# Patient Record
Sex: FEMALE | Race: WHITE | NOT HISPANIC OR LATINO | Employment: OTHER | URBAN - METROPOLITAN AREA
[De-identification: names, ages, dates, MRNs, and addresses within clinical notes are randomized per-mention and may not be internally consistent; named-entity substitution may affect disease eponyms.]

---

## 2017-01-01 ENCOUNTER — DOCUMENTATION ONLY (OUTPATIENT)
Dept: NEPHROLOGY | Facility: CLINIC | Age: 62
End: 2017-01-01

## 2017-01-05 ENCOUNTER — DOCUMENTATION ONLY (OUTPATIENT)
Dept: NEPHROLOGY | Facility: CLINIC | Age: 62
End: 2017-01-05

## 2017-01-05 NOTE — PROGRESS NOTES
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 She recently Had CABG and has had  hopsitalizations for wound infections.  Had L leg bypass recently. Her blood pressures are better on midodrine and is also taking metoprolol now.  Recent EGD neg.           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      SOCIAL HISTORY: She smokes one and half pack a day, which is down from three packs a day.       REVIEW OF SYSTEMS: Negative. No chest pain, no shortness of breath.  PHYSICAL EXAMINATION: 135/68   98.2  : T DW: 48 kg W: 53.9   kg P: 99  VITAL SIGNS: Reviewed.  GENERAL: The patient does not appear to be in any acute distress.  CARDIOVASCULAR: Rate and rhythm regular.  LUNGS: Clear to auscultation bilaterally.  EXTREMITIES: Access stable. Gonzales ext with 1+ edema on R and bandage on L . L LE healing well with bypass off  wound vac.         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      Labs pending.  1. ESRD. Kt/v 2.2. UF of 1L approx. Sometimes has weight gains of 3kgs but unable to tolerate UF more than  1L. Asked her to do an extra tx to get more fluid off.  Fluid retsriction emphasized.   2. Hypertension. Blood pressures are better-only on metoprolol 25 mg. On midodrine.   3. Anemia: had egd recently but no colonoscopy. Rec setting up colonoscopy as hgb more stable now. On mircera; had C/o dark stools- Gi appoint ASAP for colonoscopy and evaluation for nausea which is resolved. Gi does not want to do colonoscopy with her instability in the past. Hgb improving on mirecera and needs iron.   4. Nutrition: albumin is improving . She is on LiquaCel. High protein diet discussed.  5. Renal osteodystrophy: phos  high-low phos diet emphasized on fosrenol 2 packs with each meal. Off carafate started by Gi but held b/c of higher aluminum level. PTH stable-off calcitriol. Off Lasix as she does not urinate much. aluminum level improved off carafate.  C/o hematuira-although doesn't make urine-streaks of blood-rec urology eval although it could be from ruptured cysts. Heavy smoker-would need cystoscopy although it was neg from 2-3 years ago per pt. She would like to hold off until other issues are at[facility] care of. Discussed the risks.      plt ct low-hematology eval in hospital they thought it was due to abx.  If no improvement, will need to refer to hematology.

## 2017-02-16 ENCOUNTER — DOCUMENTATION ONLY (OUTPATIENT)
Dept: NEPHROLOGY | Facility: CLINIC | Age: 62
End: 2017-02-16

## 2017-02-16 NOTE — PROGRESS NOTES
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 She  Had CABG last year and L  leg bypass and has had  hopsitalizations for wound infections.  Pending R  leg bypass which is more urgent now with ulcers-pending appoint next week.  Her blood pressures are better on midodrine and is also taking metoprolol now. Recent EGD neg.           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      SOCIAL HISTORY: She smokes one and half pack a day, which is down from three packs a day.       REVIEW OF SYSTEMS: Negative. No chest pain, no shortness of breath.  PHYSICAL EXAMINATION: 112/55  97.8  : T DW: 48 kg W: 49.8   kg P: 78  VITAL SIGNS: Reviewed.  GENERAL: The patient does not appear to be in any acute distress.  CARDIOVASCULAR: Rate and rhythm regular.  LUNGS: Clear to auscultation bilaterally.  EXTREMITIES: Access stable. Gonzales ext with trace to 1+ edema on R and bandage on L . L LE healing well with bypass off  wound vac. R leg with ulcers.         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      Labs pending.  1. ESRD. Kt/v 2.2. UF of 1L-2L  Fluid retsriction emphasized. Kt/v of 2.2.  2. Hypertension. Blood pressures are better-only on metoprolol 25 mg. On midodrine.   3. Anemia: had egd recently but no colonoscopy. Rec setting up colonoscopy as hgb more stable now. On mircera; had C/o dark stools- Gi appoint ASAP for colonoscopy and evaluation for nausea which is resolved. Gi does not want to do colonoscopy with her instability in the past. Hgb improving on mirecera and finished iron.   4. Nutrition: albumin is improving . She is on LiquaCel. High protein diet discussed.  5. Renal osteodystrophy: phos high-low phos  diet emphasized on fosrenol 2 packs with each meal. Off carafate started by Gi but held b/c of higher aluminum level. PTH stable-off calcitriol. Off Lasix as she does not urinate much. aluminum level improved off carafate.  C/o hematuira-although doesn't make urine-streaks of blood-rec urology eval although it could be from ruptured cysts. Heavy smoker-would need cystoscopy although it was neg from 2-3 years ago per pt. She would like to hold off until other issues are taken care of. Discussed the risks.      plt ct low stable -hematology eval in hospital they thought it was due to abx. If no improvement, will need to refer to hematology.

## 2017-02-22 ENCOUNTER — OFFICE VISIT (OUTPATIENT)
Dept: VASCULAR SURGERY | Facility: CLINIC | Age: 62
End: 2017-02-22
Payer: COMMERCIAL

## 2017-02-22 ENCOUNTER — HOSPITAL ENCOUNTER (OUTPATIENT)
Dept: RADIOLOGY | Facility: HOSPITAL | Age: 62
Discharge: HOME | End: 2017-02-22
Attending: THORACIC SURGERY (CARDIOTHORACIC VASCULAR SURGERY)
Payer: MEDICARE

## 2017-02-22 VITALS
WEIGHT: 103.63 LBS | HEART RATE: 87 BPM | DIASTOLIC BLOOD PRESSURE: 62 MMHG | BODY MASS INDEX: 20.24 KG/M2 | SYSTOLIC BLOOD PRESSURE: 132 MMHG

## 2017-02-22 DIAGNOSIS — M79.604 LEG PAIN, RIGHT: Primary | ICD-10-CM

## 2017-02-22 DIAGNOSIS — I73.9 PERIPHERAL VASCULAR DISEASE, UNSPECIFIED: Primary | ICD-10-CM

## 2017-02-22 DIAGNOSIS — I73.9 PERIPHERAL VASCULAR DISEASE, UNSPECIFIED: ICD-10-CM

## 2017-02-22 PROCEDURE — 99213 OFFICE O/P EST LOW 20 MIN: CPT | Mod: S$GLB,,, | Performed by: THORACIC SURGERY (CARDIOTHORACIC VASCULAR SURGERY)

## 2017-02-22 PROCEDURE — 93926 LOWER EXTREMITY STUDY: CPT | Mod: TC

## 2017-02-22 PROCEDURE — 1160F RVW MEDS BY RX/DR IN RCRD: CPT | Mod: S$GLB,,, | Performed by: THORACIC SURGERY (CARDIOTHORACIC VASCULAR SURGERY)

## 2017-02-22 PROCEDURE — 93926 LOWER EXTREMITY STUDY: CPT | Mod: 26,,, | Performed by: RADIOLOGY

## 2017-02-22 RX ORDER — MINOCYCLINE HYDROCHLORIDE 100 MG/1
100 TABLET ORAL
COMMUNITY
End: 2017-07-21 | Stop reason: ALTCHOICE

## 2017-02-22 RX ORDER — OXYCODONE AND ACETAMINOPHEN 10; 325 MG/1; MG/1
1 TABLET ORAL EVERY 6 HOURS PRN
Qty: 40 TABLET | Refills: 0
Start: 2017-02-22 | End: 2017-07-21 | Stop reason: ALTCHOICE

## 2017-02-22 NOTE — PROGRESS NOTES
OFFICE NOTE    This is a 61-year-old lady with severe peripheral vascular disease.  She has   disabling limb claudication, rest pain in the right lower extremity.  She has   had multiple ulcers on the leg, but these seemed to have at least partially   healed and dried up and epithelialized.  She has no abscess or obvious   cellulitis.  She has dependent rubor and cyanosis of the extremity.    PROBLEM LIST:  Reviewed.  She has had a coronary artery bypass grafting by   myself within the last three to four months.  She has recovered from this.  She   has end-stage renal disease, hypertension and other issues that are well   described in her recent records.  She has COPD from habitual smoking.    PHYSICAL EXAMINATION:    VITAL SIGNS:  Stable.    HEENT:  Pupils are equal, round, reactive to light.  Nose and throat are clear.    NECK:  Supple.    CHEST:  Clear to auscultation.    HEART:  Regular rate and rhythm.    ABDOMEN:  Benign.    EXTREMITIES:  Femoral pulses are equal.  She has diminished pulses in both feet.    She has cyanosis of the right foot.  She has ulcerations, which are more or   less dry on the anterior aspect of the calf.  There is no obvious cellulitis.    At this point, we will get an arterial duplex of the extremity to see if she has   any options for revascularization.  Based on the results of this, we will make   further recommendations.      MICHAEL  dd: 02/22/2017 15:14:46 (CST)  td: 02/23/2017 03:20:46 (CST)  Doc ID   #[id number]  Job ID #[id number]    CC:

## 2017-04-21 ENCOUNTER — DOCUMENTATION ONLY (OUTPATIENT)
Dept: NEPHROLOGY | Facility: CLINIC | Age: 62
End: 2017-04-21

## 2017-04-21 NOTE — PROGRESS NOTES
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 She Had CABG last year and L leg bypass and has had hopsitalizations for wound infections.  R  leg stent placed last month during hospitalization. and possible bypass in that leg in the future.  Her blood pressures are better on midodrine and is not taking metoprolol now-but will check with her cardiologist today. Recent EGD neg.           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      SOCIAL HISTORY: She smokes one and half pack a day, which is down from three packs a day.       REVIEW OF SYSTEMS: Negative. No chest pain, no shortness of breath.  PHYSICAL EXAMINATION: 152/62  96.3   : T DW: 48 kg W: 47.3  kg P: 97  VITAL SIGNS: Reviewed.  GENERAL: The patient does not appear to be in any acute distress.  CARDIOVASCULAR: Rate and rhythm regular.  LUNGS: Clear to auscultation bilaterally.  EXTREMITIES: Access stable. Gonzales ext with trace to 1+ edema. L LE healing well with bypass off wound vac. R leg with ulcers healing well..          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      Labs pending.  1. ESRD. Kt/v 2.2. UF of 0.5L-1.5L  Fluid retsriction emphasized. Kt/v of 2.2.  2. Hypertension. Blood pressures are better. On midodrine.   3. Anemia: had egd recently but no colonoscopy. Rec setting up colonoscopy as hgb more stable now. On mircera;missed one dose last month b/c of hospitalization; had C/o dark stools- Gi appoint ASAP for colonoscopy and evaluation for nausea which is resolved. Gi does not want to do colonoscopy with her instability in the past. Hgb improving on mirecera and finished iron.   4. Nutrition: albumin is improving . She is on  LiquaCel. High protein diet discussed.  5. Renal osteodystrophy: phos high-low phos diet emphasized on fosrenol 2 packs with each meal. Off carafate started by Gi but held b/c of higher aluminum level. PTH stable-off calcitriol. Off Lasix as she does not urinate much. aluminum level improved off carafate.  C/o hematuira-although doesn't make urine-streaks of blood-rec urology eval although it could be from ruptured cysts. Heavy smoker-would need cystoscopy although it was neg from 2-3 years ago per pt. She would like to hold off until other issues are taken care of. Discussed the risks.   L arm access.  plt ct low stable -hematology eval in hospital they thought it was due to abx. If no improvement, will need to refer to hematology.

## 2017-05-08 ENCOUNTER — DOCUMENTATION ONLY (OUTPATIENT)
Dept: NEPHROLOGY | Facility: CLINIC | Age: 62
End: 2017-05-08

## 2017-05-08 NOTE — PROGRESS NOTES
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 She Had CABG last year and L leg bypass and has had hopsitalizations for wound infections. R leg stent placed recently during hospitalization and possible bypass in that leg in the future.  Her blood pressures are better on midodrine.  Recent EGD neg.           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      SOCIAL HISTORY: She smokes one and half pack a day, which is down from three packs a day.       REVIEW OF SYSTEMS: Negative. No chest pain, no shortness of breath.  PHYSICAL EXAMINATION: 116/56  T: 98.6  DW:47.5 kg W: 47.1 kg P: 89  VITAL SIGNS: Reviewed.  GENERAL: The patient does not appear to be in any acute distress.  CARDIOVASCULAR: Rate and rhythm regular.  LUNGS: Clear to auscultation bilaterally.  EXTREMITIES: Access stable. Gonzales ext with trace to 1+ edema. L LE healing well with bypass off wound vac. R leg with ulcers healing well.  Has erythema not right under her access but slightly below-likely trauma -sasha a line so she can keep track to see if its extending.  Above access-2 knots with erythema.          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      Labs pending.  1. ESRD. Kt/v 2.6 UF of 0.5L-1.5L Fluid retsriction emphasized. Change DW to 46.5 kg. Change K to 1 k bath-low K diet.   2. Hypertension. Blood pressures are stable on metoprolol.  On midodrine.   3. Anemia: stable.  had egd recently but no colonoscopy. Rec setting up colonoscopy as hgb more stable now. On mircera 150. had C/o dark stools- Gi appoint ASAP for colonoscopy and evaluation for nausea which is resolved. Gi does not want to do colonoscopy with her instability in the  past. Hgb improving on mirecera and will give iron bolus.   4. Nutrition: albumin is improving . She is on LiquaCel. High protein diet discussed.  5. Renal osteodystrophy: phos high-low phos diet emphasized on fosrenol 2 packs with each meal. Off carafate started by Gi but held b/c of higher aluminum level. PTH stable-off calcitriol. Off Lasix as she does not urinate much. aluminum level improved off carafate.  C/o hematuira-although doesn't make urine-streaks of blood-rec urology eval although it could be from ruptured cysts. Heavy smoker-would need cystoscopy although it was neg from 2-3 years ago per pt. She would like to hold off until other issues are taken care of. Discussed the risks.   L arm access-has been bleeding more than ususal.   Has erythema not right under her access but slightly below-likely trauma -sasha a line so [facility] can keep track to see if its extending.  Above access-2 knots with erythema. Refer to Dr. Sher for possible stenosis.   plt ct low stable -hematology eval in hospital they thought it was due to abx. If no improvement, will need to refer to hematology.

## 2017-05-09 ENCOUNTER — HOSPITAL ENCOUNTER (OUTPATIENT)
Dept: VASCULAR SURGERY | Facility: CLINIC | Age: 62
Discharge: HOME | End: 2017-05-09
Payer: MEDICARE

## 2017-05-09 ENCOUNTER — OFFICE VISIT (OUTPATIENT)
Dept: VASCULAR SURGERY | Facility: CLINIC | Age: 62
End: 2017-05-09
Payer: MEDICARE

## 2017-05-09 VITALS
HEART RATE: 88 BPM | SYSTOLIC BLOOD PRESSURE: 134 MMHG | TEMPERATURE: 99 F | WEIGHT: 106 LBS | DIASTOLIC BLOOD PRESSURE: 65 MMHG | HEIGHT: 59 IN | BODY MASS INDEX: 21.37 KG/M2

## 2017-05-09 DIAGNOSIS — T82.858A STENOSIS OF ARTERIOVENOUS DIALYSIS FISTULA, INITIAL ENCOUNTER: ICD-10-CM

## 2017-05-09 DIAGNOSIS — Z99.2 ESRD ON DIALYSIS: Primary | ICD-10-CM

## 2017-05-09 DIAGNOSIS — Z01.818 PRE-OP EVALUATION: Primary | ICD-10-CM

## 2017-05-09 DIAGNOSIS — N18.6 ESRD ON DIALYSIS: Primary | ICD-10-CM

## 2017-05-09 DIAGNOSIS — N18.6 ESRD ON DIALYSIS: ICD-10-CM

## 2017-05-09 DIAGNOSIS — Z99.2 ESRD ON DIALYSIS: ICD-10-CM

## 2017-05-09 PROCEDURE — 99214 OFFICE O/P EST MOD 30 MIN: CPT | Mod: S$GLB,,, | Performed by: SURGERY

## 2017-05-09 PROCEDURE — 93990 DOPPLER FLOW TESTING: CPT | Mod: PBBFAC | Performed by: SURGERY

## 2017-05-09 PROCEDURE — 1160F RVW MEDS BY RX/DR IN RCRD: CPT | Mod: S$GLB,,, | Performed by: SURGERY

## 2017-05-09 PROCEDURE — 99999 PR PBB SHADOW E&M-EST. PATIENT-LVL III: CPT | Mod: PBBFAC,,, | Performed by: SURGERY

## 2017-05-09 PROCEDURE — 93990 DOPPLER FLOW TESTING: CPT | Mod: 26,S$PBB,, | Performed by: SURGERY

## 2017-05-09 PROCEDURE — 99213 OFFICE O/P EST LOW 20 MIN: CPT | Mod: PBBFAC | Performed by: SURGERY

## 2017-05-09 RX ORDER — LIDOCAINE HYDROCHLORIDE 10 MG/ML
1 INJECTION, SOLUTION EPIDURAL; INFILTRATION; INTRACAUDAL; PERINEURAL ONCE
Status: CANCELLED | OUTPATIENT
Start: 2017-05-09 | End: 2017-05-09

## 2017-05-09 RX ORDER — SODIUM CHLORIDE 9 MG/ML
INJECTION, SOLUTION INTRAVENOUS CONTINUOUS
Status: CANCELLED | OUTPATIENT
Start: 2017-05-09

## 2017-05-09 NOTE — PROGRESS NOTES
"HISTORY OF PRESENT ILLNESS:  A 61-year-old female with end-stage renal disease   in whom I placed a left brachiocephalic AV fistula on 03/14/2014.    I have not seen her in over five years.    She reports having increased bleeding after dialysis runs.  She does dialyze at   home with the exception of Wednesdays and Saturdays.    It appears her last fistulogram was several years ago.    She is very concerned that she has "black things," coming out of various areas   of her skin.  She clearly bruises very easily and I wonder if this is just small   spontaneous bleeding.    PAST MEDICAL HISTORY:  1.  End-stage renal disease.  2.  Left internal carotid artery cavernous aneurysm.  3.  COPD.  4.  Hyperparathyroidism.  5.  Hypertension.  6.  Coronary artery disease, status post CABG June 2016.  7.  Peripheral arterial occlusive disease, status post fem-pop bypass in [place].    PAST SURGICAL HISTORY:  1.  As above.  2.  Hysterectomy.    FAMILY HISTORY:  Positive for cancer and heart disease.    SOCIAL HISTORY:  Current smoking.    MEDICATIONS:  Include aspirin and Plavix.  See EPIC for full list.    ALLERGIES:  Sulfa.    REVIEW OF SYSTEMS:  CARDIOVASCULAR:  Denies postprandial pain or DVT.  All other systems including eyes, ENT, respiratory, musculoskeletal, breast,   neurologic, psychiatric, lymph allergy and immune are negative.    PHYSICAL EXAMINATION:  VITAL SIGNS:  See nursing note.  GENERAL:  No acute distress.  RESPIRATORY:  Normal effort.  Clear to auscultation.  CARDIOVASCULAR:  Regular rhythm, nondisplaced PMI, no murmur.  EXTREMITIES:  Left arm shows multiple areas of ecchymosis bilaterally.  The   brachiocephalic fistula with moderate true aneurysmal changes over the first 4   to 5 cm and skin thinning and ulceration.  Notably, the fistula has moderate to   severe pulsatility in the upper arm suggestive of an outflow stenosis.  NEUROLOGIC:  Cranial nerves VII to XII are intact, 5/5 motor strength in all " "  extremities.    IMAGING:  Duplex of her fistula does not show any outflow stenosis with a flow   volume of 1.6 liters.    ASSESSMENT:  Clinically, she has a high outflow stenosis with increased bleeding   and a pulsatile fistula.  A fistulogram is warranted.    I believe that her concerns about "organisms" coming out of skin at these   ecchymotic areas may be punctate bleeding and I have suggested that this may   improve substantially if she can stop the Plavix.  I have suggested that she   talk with her heart doctor to see if it is okay for her to stop the Plavix.    PLAN:  Left fistulogram and possible intervention, 05/17/2017.    The patient understands the risks and rationale of the procedure and wishes to   proceed.      DEJA  dd: 05/09/2017 14:54:06 (CDT)  td: 05/09/2017 15:22:44 (CDT)  Doc ID   #[id number]  Job ID #[id number]    CC:       "

## 2017-05-16 ENCOUNTER — TELEPHONE (OUTPATIENT)
Dept: VASCULAR SURGERY | Facility: CLINIC | Age: 62
End: 2017-05-16

## 2017-05-17 ENCOUNTER — ANESTHESIA (OUTPATIENT)
Dept: SURGERY | Facility: HOSPITAL | Age: 62
End: 2017-05-17
Payer: MEDICARE

## 2017-05-17 ENCOUNTER — ANESTHESIA EVENT (OUTPATIENT)
Dept: SURGERY | Facility: HOSPITAL | Age: 62
End: 2017-05-17
Payer: MEDICARE

## 2017-05-17 ENCOUNTER — HOSPITAL ENCOUNTER (OUTPATIENT)
Facility: HOSPITAL | Age: 62
Discharge: HOME | End: 2017-05-17
Attending: SURGERY | Admitting: SURGERY
Payer: MEDICARE

## 2017-05-17 VITALS
OXYGEN SATURATION: 94 % | TEMPERATURE: 99 F | RESPIRATION RATE: 20 BRPM | HEART RATE: 74 BPM | SYSTOLIC BLOOD PRESSURE: 105 MMHG | DIASTOLIC BLOOD PRESSURE: 54 MMHG

## 2017-05-17 DIAGNOSIS — Z99.2 ESRD ON DIALYSIS: ICD-10-CM

## 2017-05-17 DIAGNOSIS — M25.532 LEFT WRIST PAIN: ICD-10-CM

## 2017-05-17 DIAGNOSIS — S52.109A: ICD-10-CM

## 2017-05-17 DIAGNOSIS — I25.10 CORONARY ARTERY DISEASE, ANGINA PRESENCE UNSPECIFIED, UNSPECIFIED VESSEL OR LESION TYPE, UNSPECIFIED WHETHER NATIVE OR TRANSPLANTED HEART: Primary | Chronic | ICD-10-CM

## 2017-05-17 DIAGNOSIS — N18.6 ESRD ON DIALYSIS: ICD-10-CM

## 2017-05-17 DIAGNOSIS — S52.502A CLOSED FRACTURE OF DISTAL END OF LEFT RADIUS, UNSPECIFIED FRACTURE MORPHOLOGY, INITIAL ENCOUNTER: ICD-10-CM

## 2017-05-17 PROCEDURE — D9220A PRA ANESTHESIA: Mod: CRNA,,, | Performed by: NURSE ANESTHETIST, CERTIFIED REGISTERED

## 2017-05-17 PROCEDURE — 36000707: Performed by: SURGERY

## 2017-05-17 PROCEDURE — 25000003 PHARM REV CODE 250

## 2017-05-17 PROCEDURE — C1769 GUIDE WIRE: HCPCS | Performed by: SURGERY

## 2017-05-17 PROCEDURE — 37000009 HC ANESTHESIA EA ADD 15 MINS: Performed by: SURGERY

## 2017-05-17 PROCEDURE — 25000003 PHARM REV CODE 250: Performed by: NURSE ANESTHETIST, CERTIFIED REGISTERED

## 2017-05-17 PROCEDURE — 25500020 PHARM REV CODE 255: Performed by: SURGERY

## 2017-05-17 PROCEDURE — 25000003 PHARM REV CODE 250: Performed by: SURGERY

## 2017-05-17 PROCEDURE — 36000706: Performed by: SURGERY

## 2017-05-17 PROCEDURE — 37000008 HC ANESTHESIA 1ST 15 MINUTES: Performed by: SURGERY

## 2017-05-17 PROCEDURE — 71000015 HC POSTOP RECOV 1ST HR: Performed by: SURGERY

## 2017-05-17 PROCEDURE — C1894 INTRO/SHEATH, NON-LASER: HCPCS | Performed by: SURGERY

## 2017-05-17 PROCEDURE — C1725 CATH, TRANSLUMIN NON-LASER: HCPCS | Performed by: SURGERY

## 2017-05-17 PROCEDURE — D9220A PRA ANESTHESIA: Mod: ANES,,, | Performed by: ANESTHESIOLOGY

## 2017-05-17 PROCEDURE — 36902 INTRO CATH DIALYSIS CIRCUIT: CPT | Mod: GC,,, | Performed by: SURGERY

## 2017-05-17 PROCEDURE — 27201423 OPTIME MED/SURG SUP & DEVICES STERILE SUPPLY: Performed by: SURGERY

## 2017-05-17 PROCEDURE — 71000016 HC POSTOP RECOV ADDL HR: Performed by: SURGERY

## 2017-05-17 PROCEDURE — 63600175 PHARM REV CODE 636 W HCPCS: Performed by: NURSE ANESTHETIST, CERTIFIED REGISTERED

## 2017-05-17 RX ORDER — HYDROCODONE BITARTRATE AND ACETAMINOPHEN 5; 325 MG/1; MG/1
1 TABLET ORAL EVERY 4 HOURS PRN
Status: DISCONTINUED | OUTPATIENT
Start: 2017-05-17 | End: 2017-05-17

## 2017-05-17 RX ORDER — IODIXANOL 320 MG/ML
INJECTION, SOLUTION INTRAVASCULAR
Status: DISCONTINUED | OUTPATIENT
Start: 2017-05-17 | End: 2017-05-17 | Stop reason: HOSPADM

## 2017-05-17 RX ORDER — MIDAZOLAM HYDROCHLORIDE 1 MG/ML
INJECTION INTRAMUSCULAR; INTRAVENOUS
Status: DISCONTINUED | OUTPATIENT
Start: 2017-05-17 | End: 2017-05-17

## 2017-05-17 RX ORDER — OXYCODONE HYDROCHLORIDE 5 MG/1
TABLET ORAL
Status: COMPLETED
Start: 2017-05-17 | End: 2017-05-17

## 2017-05-17 RX ORDER — PROPOFOL 10 MG/ML
VIAL (ML) INTRAVENOUS
Status: DISCONTINUED | OUTPATIENT
Start: 2017-05-17 | End: 2017-05-17

## 2017-05-17 RX ORDER — OXYCODONE HYDROCHLORIDE 5 MG/1
15 TABLET ORAL EVERY 4 HOURS PRN
Status: DISCONTINUED | OUTPATIENT
Start: 2017-05-17 | End: 2017-05-17 | Stop reason: HOSPADM

## 2017-05-17 RX ORDER — HEPARIN SODIUM 1000 [USP'U]/ML
INJECTION, SOLUTION INTRAVENOUS; SUBCUTANEOUS
Status: DISCONTINUED | OUTPATIENT
Start: 2017-05-17 | End: 2017-05-17 | Stop reason: HOSPADM

## 2017-05-17 RX ORDER — FENTANYL CITRATE 50 UG/ML
INJECTION, SOLUTION INTRAMUSCULAR; INTRAVENOUS
Status: DISCONTINUED | OUTPATIENT
Start: 2017-05-17 | End: 2017-05-17

## 2017-05-17 RX ORDER — LIDOCAINE HYDROCHLORIDE 10 MG/ML
1 INJECTION, SOLUTION EPIDURAL; INFILTRATION; INTRACAUDAL; PERINEURAL ONCE
Status: DISCONTINUED | OUTPATIENT
Start: 2017-05-17 | End: 2017-05-17 | Stop reason: HOSPADM

## 2017-05-17 RX ORDER — SODIUM CHLORIDE 9 MG/ML
INJECTION, SOLUTION INTRAVENOUS CONTINUOUS PRN
Status: DISCONTINUED | OUTPATIENT
Start: 2017-05-17 | End: 2017-05-17

## 2017-05-17 RX ORDER — LIDOCAINE HYDROCHLORIDE 10 MG/ML
INJECTION, SOLUTION INTRAVENOUS
Status: DISCONTINUED | OUTPATIENT
Start: 2017-05-17 | End: 2017-05-17

## 2017-05-17 RX ORDER — LIDOCAINE HYDROCHLORIDE 10 MG/ML
INJECTION, SOLUTION EPIDURAL; INFILTRATION; INTRACAUDAL; PERINEURAL
Status: DISCONTINUED | OUTPATIENT
Start: 2017-05-17 | End: 2017-05-17 | Stop reason: HOSPADM

## 2017-05-17 RX ADMIN — PROPOFOL 20 MG: 10 INJECTION, EMULSION INTRAVENOUS at 11:05

## 2017-05-17 RX ADMIN — Medication 2 G: at 10:05

## 2017-05-17 RX ADMIN — OXYCODONE HYDROCHLORIDE 15 MG: 5 TABLET ORAL at 01:05

## 2017-05-17 RX ADMIN — PROPOFOL 10 MG: 10 INJECTION, EMULSION INTRAVENOUS at 11:05

## 2017-05-17 RX ADMIN — SODIUM CHLORIDE: 0.9 INJECTION, SOLUTION INTRAVENOUS at 10:05

## 2017-05-17 RX ADMIN — MIDAZOLAM HYDROCHLORIDE 1 MG: 1 INJECTION, SOLUTION INTRAMUSCULAR; INTRAVENOUS at 10:05

## 2017-05-17 RX ADMIN — FENTANYL CITRATE 100 MCG: 50 INJECTION, SOLUTION INTRAMUSCULAR; INTRAVENOUS at 11:05

## 2017-05-17 RX ADMIN — LIDOCAINE HYDROCHLORIDE 10 MG: 10 INJECTION, SOLUTION INTRAVENOUS at 11:05

## 2017-05-17 NOTE — SUBJECTIVE & OBJECTIVE
Past Medical History:   Diagnosis Date    Aneurysm of cavernous portion of left internal carotid artery     Anxiety associated with depression     COPD (chronic obstructive pulmonary disease)     ESRD (end stage renal disease) on dialysis     Hyperparathyroidism, secondary renal     Hypertension     Myocardial infarct     age 40,41 years     PLD (polycystic liver disease)     Polycystic kidney disease, autosomal dominant     Thyroid disease        Past Surgical History:   Procedure Laterality Date     SECTION, LOW TRANSVERSE  ,    CORONARY ANGIOPLASTY WITH STENT PLACEMENT      Has 2  stents    DIALYSIS FISTULA CREATION      HYSTERECTOMY         Review of patient's allergies indicates:   Allergen Reactions    Sulfamethoxazole-trimethoprim Other (See Comments)     Pt is unsure if she is actually allergic to the medicine.        Current Facility-Administered Medications   Medication    lidocaine (PF) 10 mg/ml (1%) injection 10 mg    oxycodone immediate release tablet 15 mg     Family History     Problem Relation (Age of Onset)    Alcohol abuse Brother    Cancer Brother    Heart disease Brother    Hypertension Mother    Kidney disease Father, Sister, Brother    Polycystic kidney disease Sister        Social History Main Topics    Smoking status: Current Some Day Smoker     Packs/day: 1.00    Smokeless tobacco: Not on file      Comment: Smokes one to two packs/day    Alcohol use No    Drug use: No    Sexual activity: Yes     Partners: Male     ROS     Review of Systems   Constitution: Negative. Negative for chills, fever and night sweats.   HENT: neg congestion.    Eyes: Negative for blurred vision.  Cardiovascular: Negative for chest pain and syncope.   Respiratory: Negative for cough and shortness of breath.    Hematologic/Lymphatic: Does not bruise/bleed easily.   Musculoskeletal: + fall.  No weakness  Gastrointestinal: Negative for abdominal pain.  Normal bowel  function.  Genitourinary: Normal bladder function   Neurological: No dizziness, loss of balance, seizures.   Psychiatric/Behavioral: Negative for depression.       Objective:     Vital Signs (Most Recent):  Temp: 97.5 °F (36.4 °C) (05/17/17 1133)  Pulse: 67 (05/17/17 1330)  Resp: 20 (05/17/17 1215)  BP: (!) 97/44 (05/17/17 1330)  SpO2: 97 % (05/17/17 1330) Vital Signs (24h Range):  Temp:  [97.5 °F (36.4 °C)] 97.5 °F (36.4 °C)  Pulse:  [66-74] 67  Resp:  [20] 20  SpO2:  [95 %-97 %] 97 %  BP: ()/(38-48) 97/44           There is no height or weight on file to calculate BMI.      Intake/Output Summary (Last 24 hours) at 05/17/17 1401  Last data filed at 05/17/17 1134   Gross per 24 hour   Intake              200 ml   Output                0 ml   Net              200 ml       Ortho/SPM Exam    Gen:  No acute distress  Head:  Normocephalic.  Atraumatic.  Neuro:  Intact  CV:  Peripherally well-perfused.  Pulses 2+ bilaterally.  Lungs:  Normal respiratory effort.  Abdomen:  Soft, non-tender, non-distended  Extremities:  No cyanosis, clubbing, or edema.  LUE:  Dressing from fistulogram intact.  Significantly tender in distal radius.  Mild tenderness in scaphoid tuberosity and snuffbox.  Palpable radial pulse.  AIN, PIN, median, radial, ulnar nerves intact.  Diffusely poor friable skin with petechiae    Significant Labs:     Significant Imaging:   Xray left wrist shows non-displaced distal radius fracture.  Questionable cortical defect in scaphoid which appears chronic.

## 2017-05-17 NOTE — CONSULTS
[facility]  Orthopedics  Consult Note    Patient Name: Althea Hsieh  MRN: [id number]  Admission Date: 2017  Hospital Length of Stay: 0 days  Attending Provider: ROBLES Goldberg III, MD  Primary Care Provider: Abelardo San MD    Patient information was obtained from patient.     Consults  Subjective:     Principal Problem:<principal problem not specified>    Chief Complaint: left wrist pain     HPI: Patient is a 61 year old female here for left upper extremity fistulogram with Dr. Goldberg.  She fell 2 days ago from a standing height onto her outstretched left hand and left hip.  She continues to have left wrist pain today.  Orthopedics consulted post-op for evaluation of left wrist.     Patient's left hip pain is significantly better today.      Past Medical History:   Diagnosis Date    Aneurysm of cavernous portion of left internal carotid artery     Anxiety associated with depression     COPD (chronic obstructive pulmonary disease)     ESRD (end stage renal disease) on dialysis     Hyperparathyroidism, secondary renal     Hypertension     Myocardial infarct     age 40,41 years     PLD (polycystic liver disease)     Polycystic kidney disease, autosomal dominant     Thyroid disease        Past Surgical History:   Procedure Laterality Date     SECTION, LOW TRANSVERSE  ,    CORONARY ANGIOPLASTY WITH STENT PLACEMENT      Has 2  stents    DIALYSIS FISTULA CREATION      HYSTERECTOMY         Review of patient's allergies indicates:   Allergen Reactions    Sulfamethoxazole-trimethoprim Other (See Comments)     Pt is unsure if she is actually allergic to the medicine.        Current Facility-Administered Medications   Medication    lidocaine (PF) 10 mg/ml (1%) injection 10 mg    oxycodone immediate release tablet 15 mg     Family History     Problem Relation (Age of Onset)    Alcohol abuse Brother    Cancer Brother    Heart disease Brother    Hypertension  Mother    Kidney disease Father, Sister, Brother    Polycystic kidney disease Sister        Social History Main Topics    Smoking status: Current Some Day Smoker     Packs/day: 1.00    Smokeless tobacco: Not on file      Comment: Smokes one to two packs/day    Alcohol use No    Drug use: No    Sexual activity: Yes     Partners: Male     ROS     Review of Systems   Constitution: Negative. Negative for chills, fever and night sweats.   HENT: neg congestion.    Eyes: Negative for blurred vision.  Cardiovascular: Negative for chest pain and syncope.   Respiratory: Negative for cough and shortness of breath.    Hematologic/Lymphatic: Does not bruise/bleed easily.   Musculoskeletal: + fall.  No weakness  Gastrointestinal: Negative for abdominal pain.  Normal bowel function.  Genitourinary: Normal bladder function   Neurological: No dizziness, loss of balance, seizures.   Psychiatric/Behavioral: Negative for depression.       Objective:     Vital Signs (Most Recent):  Temp: 97.5 °F (36.4 °C) (05/17/17 1133)  Pulse: 67 (05/17/17 1330)  Resp: 20 (05/17/17 1215)  BP: (!) 97/44 (05/17/17 1330)  SpO2: 97 % (05/17/17 1330) Vital Signs (24h Range):  Temp:  [97.5 °F (36.4 °C)] 97.5 °F (36.4 °C)  Pulse:  [66-74] 67  Resp:  [20] 20  SpO2:  [95 %-97 %] 97 %  BP: ()/(38-48) 97/44           There is no height or weight on file to calculate BMI.      Intake/Output Summary (Last 24 hours) at 05/17/17 1401  Last data filed at 05/17/17 1134   Gross per 24 hour   Intake              200 ml   Output                0 ml   Net              200 ml       Ortho/SPM Exam    Gen:  No acute distress  Head:  Normocephalic.  Atraumatic.  Neuro:  Intact  CV:  Peripherally well-perfused.  Pulses 2+ bilaterally.  Lungs:  Normal respiratory effort.  Abdomen:  Soft, non-tender, non-distended  Extremities:  No cyanosis, clubbing, or edema.  LUE:  Dressing from fistulogram intact.  Significantly tender in distal radius.  Mild tenderness in  scaphoid tuberosity and snuffbox.  Palpable radial pulse.  AIN, PIN, median, radial, ulnar nerves intact.  Diffusely poor friable skin with petechiae    Significant Labs:     Significant Imaging:   Xray left wrist shows non-displaced distal radius fracture.  Questionable cortical defect in scaphoid which appears chronic.    Xray left hip shows no fractures or dislocations    Assessment/Plan:     Fracture of left distal radius  Closed left distal radius fracture, non-displaced.  Non-op treatment.    Unable to splint in sugar tong due to vascular access.  Will place in removeable wrist brace with thumb spica for possible concomitant scaphoid fracture.    F/u in clinic in ~2 weeks.    non weight bearing LUE.  May remove splint for access.      No hip fracture    Thank you for your consult.     Jerson Gill MD  Orthopedics  [facility] Note:  I agree with the above assessment and plan.    Matthew Godfrey MD

## 2017-05-17 NOTE — PROGRESS NOTES
Patient with complaints of fall on Monday with pain in her left wrist and swelling.  Xray of wrist taken demonstrating a nondisplaced left radius fracture. Orthopedic surgery consulted, pending recommendations plan for discharge home to follow up with Dr. Goldberg later this week with vascular US and suture removal.    Oralia Trotter DO  PGY-6, Vascular fellow   [phone]

## 2017-05-17 NOTE — DISCHARGE INSTRUCTIONS
Do not bare any weight on left wrist.  Orthopedic team will call for follow-up appointment.      Wrist Fracture, General  You have a broken bone (fracture) in your wrist. This may be a small crack or chip in the bone. Or it may be a major break, with the broken parts pushed out of position. Wrist fractures are treated with a splint or cast. They take about 4 to 6 weeks to heal. Severe injuries may need surgery.    Home care  Follow these guidelines when caring for yourself at home:  · Keep your arm elevated to reduce pain and swelling. When sitting or lying down keep your arm above the level of your heart. You can do this by placing your arm on a pillow that rests on your chest or on a pillow at your side. This is most important during the first 2 days (48 hours) after the injury.  · Put an ice pack on the injured area. Do this for 20 minutes every 1 to 2 hours the first day for pain relief. You can make an ice pack by wrapping a plastic bag of ice cubes in a thin towel. As the ice melts, be careful that the cast or splint doesnt get wet. Continue using the ice pack 3 to 4 times a day for the next 2 days. Then use the ice pack as needed to ease pain and swelling.  · Keep the cast or splint completely dry at all times. Bathe with your cast or splint out of the water. Protect it with a large plastic bag, rubber-banded at the top end. If a fiberglass cast or splint gets wet, you can dry it with a hair dryer.  · You may use acetaminophen or ibuprofen to control pain, unless another pain medicine was prescribed. If you have chronic liver or kidney disease, talk with your health care provider before using these medicines. Also talk with your provider if youve had a stomach ulcer or GI bleeding.  · Dont put creams or objects under the cast if you have itching.  Follow-up care  Follow up with your health care provider in 1 week, or as advised. This is to make sure the bone is healing the way it should. If a splint was  put on, it will be changed to a cast during your follow-up visit. A cast may need to be changed at 2 to 3 weeks, as the swelling goes down.  If X-rays were taken, a radiologist will look at them. You will be told of any new findings that may affect your care.  When to seek medical advice  Call your health care provider right away if any of these occur:  · The plaster cast or splint becomes wet or soft  · The cast cracks  · Bad odor from the cast or wound fluid stains the cast  · The fiberglass cast or splint stays wet for more than 24 hours  · Tightness or pain under the cast or splint gets worse  · Fingers become swollen, cold, blue, numb, or tingly  · You cant move your fingers  · Skin around cast becomes red  Date Last Reviewed: 2/16/2015 © 2000-2016 [employer]. [address] [place] [address]. All rights reserved. This information is not intended as a substitute for professional medical care. Always follow your healthcare professional's instructions.            Arteriovenous (AV) Fistula for Dialysis  An AV fistula is a connection between an artery and a vein. For this procedure, an AV fistula is surgically created using an artery and a vein in your arm. (Your doctor will let you know if another site is to be used.) When the artery and vein are joined, blood flow increases from the artery into the vein. As a result, the vein enlarges over time. The enlarged vein provides easier access to the blood for dialysis (a treatment for kidney failure). This sheet explains the procedure and what to expect.     An AV fistula increases blood flow from the artery into the vein. Over time, the vein becomes stronger and enlarged.   Preparing for the Procedure  Prepare as you have been told. In addition:  · Tell your doctor about all medications you take. This includes over-the-counter drugs. It also includes herbs and other supplements. You may need to stop taking some or all of them before the  procedure.  · Follow any directions youre given for not eating or drinking before the procedure.  · Do not allow anyone to draw blood from or take blood pressure on the arm that will have the fistula prior to the procedure.  The Day of the Procedure  The procedure takes about 1-2 hours. Youll likely go home the same day.  Before the procedure begins:  · An IV line is put into a vein in the arm or hand not being used for the procedure. This line supplies fluids and medications.  · To keep you free of pain during the procedure, youre given general anesthesia. This medication puts you into a state like deep sleep through the procedure. Or a nerve block may be used. This medication numbs the arm. With it, you may also be given medication that makes you relaxed and drowsy through the procedure.  During the procedure:  · The skin over your arm may be injected with numbing medication.  · One or more small incisions are then made through the numbed skin. This depends on the size of your arm and the depth of the vein in your arm.  · The vein is attached to the selected artery.  · Any incisions made are then closed with sutures (stitches), staples, surgical glue, or strips of surgical tape.  After the procedure:  · Youll be asked to keep your arm raised (elevated) as often as possible for at least a week after the procedure.  · Youll be given medications to manage pain as needed.  · Your arm and hand will be checked to make sure blood is flowing through the fistula properly. The feeling of blood rushing through the fistula is called a thrill. It is somewhat similar to the purring of a cat. Youll be taught how to check for this feeling each day to make sure there are no problems with your fistula. Youll also be taught how to care for your fistula at home.  · When its time for you to leave the hospital, have an adult family member or friend ready to drive you home.  Recovering at Home  Once at home, follow all of the  instructions youve been given. Be sure to:  · Take all medications as directed.  · Care for your incision as instructed.  · Check for signs of infection at the incision site (see below).  · Avoid heavy lifting and strenuous activities as directed.  · Monitor and care for your fistula as instructed.  · Do your hand and arm exercises as instructed. This usually involves squeezing a ball in your hand for a few minutes each hour.  Call Your Health Care Provider If You Have Any of the Following:  · Fever of 100.4°F or higher  · Signs of infection at the incision site, such as increased redness or swelling, warmth, worsening pain, bleeding, or foul-smelling drainage  · Lack of a thrill (you cant feel it)  · Pain or numbness in your fingers, hand, or arm  · Bleeding, redness, or warmth around your fistula  · Sudden bulging of the fistula (more than usual; a slight bulge is normal)   Follow-Up  Your health care provider will check your fistula within 1 to 2 weeks after the procedure. It will likely take about 6 to 8 weeks for the fistula to enlarge enough to start dialysis. After that, make sure the fistula is checked each time you have dialysis. Your health care provider may also suggest checkups every 6 months.  Risks and Possible Complications Include:  · Failure of the fistula to work properly  · Long wait before the fistula is ready (up to 6 months)  · Coldness or numbness in the hand (due to blood flowing away from the hand and into the fistula)  · An unsightly bump under the skin (due to enlargement of the fistula)  · Prolonged bleeding from the fistula after dialysis  · Narrowing or weakening of the blood vessels used for the fistula  · Formation of blood clots in the blood vessels used for the fistula  · Risks of anesthesia or any other medications used during the procedure   Living with an AV Fistula  A problem, such as narrowing of the vein (stricture) or infection, can make the fistula unusable. If this  happens, you may need other treatments to repair or make a new fistula. To protect your fistula, follow these and any other guidelines youre given:  · Check your fistula as often as your health care provider says. If you cant feel your thrill, let he or she know right away.  · Make sure your fistula is checked before each dialysis treatment.  · Dont let anyone draw blood from or take blood pressure on the arm that has the fistula.  · Wash your hands often and keep the area around your fistula clean.  · Dont sleep on the arm that has the fistula.  · Dont wear tight jewelry or a watch on the arm with your fistula.  · Protect your fistula from cuts, scrapes, or blows.  Date Last Reviewed: 11/26/2014 © 2000-2016 [employer]. [address] [place] [address], [place] [address]. All rights reserved. This information is not intended as a substitute for professional medical care. Always follow your healthcare professional's instructions.

## 2017-05-17 NOTE — OP NOTE
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    Description of the Findings of the Procedure: high grade stenosis at cephalic confluence; decreased filling of collaterals after PTA; strong thrill at conclusion 2+ left radial pulse.      Complications: No    Estimated Blood Loss (EBL): 10 mL           Implants: none    Specimens:   Specimen     None                  Condition: Good    Disposition: PACU - hemodynamically stable.     Operation in detail: After an informed consent was obtained the patient was taken to the interventional suite and placed in the supine position.  The left arm was prepped and draped in the standard surgical fashion. Under palpation, the proximal aspect of the left AVF was accessed with a micropuncture needle;  followed by placement of 4/3-Macanese micropuncture dilator. Through this, an 0.035-inch Glidewire was placed through the high-grade stenosis cephalic arch stenosis, which was demonstrated by the angiogram found secondary to increased collateralization proximal to area of stenosis.  A 7x35 cm sheath was introduced to proximal cephalic vein.  The area of high grade stenosis was treated with a 10x40 mm Bourbon balloon.  Decreased filling of collateral vessels was noted after PTA.  An venous web was noted in proximal cephalic vein that was  treated with 10x40 mm Real balloon.  Strong thrill could be felt. The sheath was removed, 3-0 Monocryl U-stitch was placed with good hemostasis.

## 2017-05-17 NOTE — INTERVAL H&P NOTE
The patient has been examined and the H&P has been reviewed:    I concur with the findings and no changes have occurred since H&P was written.    Anesthesia/Surgery risks, benefits and alternative options discussed and understood by patient/family.          Active Hospital Problems    Diagnosis  POA    ESRD on dialysis [N18.6, Z99.2]  Not Applicable     Chronic      Resolved Hospital Problems    Diagnosis Date Resolved POA   No resolved problems to display.

## 2017-05-17 NOTE — PROGRESS NOTES
"Received call from Geno MONTGOMERY RN to inform that Dr. Trotter called and notified pt "does have left wrist fracture. Ortho consulted, will come to bedside to place splint". Per Dr. Trotter, "make sure splint does not cover fistula site on upper left arm". Instructed to page if any further information, instructions or assistance needed. Pt informed, aware. No comlaints at this time. No signs of distress noted. Will continue to monitor pt at bedside.   "

## 2017-05-17 NOTE — ASSESSMENT & PLAN NOTE
Closed left distal radius fracture, non-displaced.  Non-op treatment.    Unable to splint in sugar tong due to vascular access.  Will place in removeable wrist brace with thumb spica for possible concomitant scaphoid fracture.    F/u in clinic in ~2 weeks.

## 2017-05-17 NOTE — IP AVS SNAPSHOT
[facility]  [address]  [facility] [address]  Phone: [phone]           Patient Discharge Instructions   Our goal is to set you up for success. This packet includes information on your condition, medications, and your home care.  It will help you care for yourself to prevent having to return to the hospital.     Please ask your nurse if you have any questions.      There are many details to remember when preparing to leave the hospital. Here is what you will need to do:    1. Take your medicine. If you are prescribed medications, review your Medication List on the following pages. You may have new medications to  at the pharmacy and others that you'll need to stop taking. Review the instructions for how and when to take your medications. Talk with your doctor or nurses if you are unsure of what to do.     2. Go to your follow-up appointments. Specific follow-up information is listed in the following pages. Your may be contacted by a nurse or clinical provider about future appointments. Be sure we have all of the phone numbers to reach you. Please contact your provider's office if you are unable to make an appointment.     3. Watch for warning signs. Your doctor or nurse will give you detailed warning signs to watch for and when to call for assistance. These instructions may also include educational information about your condition. If you experience any of warning signs to your health, call your doctor.           [facility]  Unless otherwise directed by your provider, please   contact [facility], our nurse care line   that is available for 24/7 assistance.     [phone] (toll-free)     Registered nurses in the [facility]   provide: appointment scheduling, clinical advisement, health education, and other advisory services.                  ** Verify the list of medication(s) below is accurate and up to date. Carry this with you in case of  emergency. If your medications have changed, please notify your healthcare provider.             Medication List      CONTINUE taking these medications        Additional Info                      ABILIFY ORAL   Refills:  0   Dose:  1 tablet    Instructions:  Take 1 tablet by mouth once daily.     Begin Date    AM    Noon    PM    Bedtime       albuterol 90 mcg/actuation inhaler   Refills:  0   Dose:  1-2 puff    Instructions:  Inhale 1-2 puffs into the lungs every 6 (six) hours as needed for Wheezing or Shortness of Breath.     Begin Date    AM    Noon    PM    Bedtime       alprazolam 1 MG tablet   Commonly known as:  XANAX   Refills:  0   Dose:  1 mg    Instructions:  Take 1 mg by mouth 3 (three) times daily as needed for Anxiety.     Begin Date    AM    Noon    PM    Bedtime       amoxicillin-clavulanate 400-57 mg/5 mL Susr   Commonly known as:  AUGMENTIN   Refills:  0    Instructions:  TAKE TWO TEASPOONSFUL BY MOUTH EVERY TWELVE HOURS WITH A SNACK FOR 10 DAYS UNTIL ALL TAKEN FOR INFECTION     Begin Date    AM    Noon    PM    Bedtime       aspirin 81 MG EC tablet   Commonly known as:  ECOTRIN   Quantity:  365 tablet   Refills:  0   Dose:  81 mg    Instructions:  Take 1 tablet (81 mg total) by mouth once daily.     Begin Date    AM    Noon    PM    Bedtime       benazepril 20 MG tablet   Commonly known as:  LOTENSIN   Refills:  0   Dose:  20 mg   Indications:  Hypertension   Comments:  Resume after HD 11/4/2014    Instructions:  Take 1 tablet (20 mg total) by mouth once daily.     Begin Date    AM    Noon    PM    Bedtime       buPROPion 150 MG TBSR 12 hr tablet   Commonly known as:  WELLBUTRIN SR   Refills:  0   Dose:  150 mg    Instructions:  Take 150 mg by mouth 2 (two) times daily.     Begin Date    AM    Noon    PM    Bedtime       clopidogrel 75 mg tablet   Commonly known as:  PLAVIX   Refills:  0   Dose:  75 mg    Instructions:  Take 75 mg by mouth once daily.     Begin Date    AM    Noon    PM    Bedtime        doxycycline 100 MG Cap   Commonly known as:  VIBRAMYCIN   Refills:  0   Dose:  100 mg    Instructions:  Take 100 mg by mouth 2 (two) times daily.     Begin Date    AM    Noon    PM    Bedtime       FOSRENOL 1,000 mg Pwpk   Refills:  0   Generic drug:  lanthanum      Begin Date    AM    Noon    PM    Bedtime       LEXAPRO 20 MG tablet   Refills:  0   Dose:  20 mg   Generic drug:  escitalopram oxalate    Instructions:  Take 20 mg by mouth. 1 Tablet Oral Every day     Begin Date    AM    Noon    PM    Bedtime       metoprolol succinate 50 MG 24 hr tablet   Commonly known as:  TOPROL-XL   Refills:  0   Dose:  50 mg    Instructions:  Take 50 mg by mouth once daily.     Begin Date    AM    Noon    PM    Bedtime       minocycline 100 MG tablet   Commonly known as:  DYNACIN   Refills:  0   Dose:  100 mg    Instructions:  Take 100 mg by mouth every 12 (twelve) hours.     Begin Date    AM    Noon    PM    Bedtime       oxycodone-acetaminophen  mg per tablet   Commonly known as:  PERCOCET   Quantity:  40 tablet   Refills:  0   Dose:  1 tablet    Instructions:  Take 1 tablet by mouth every 6 (six) hours as needed for Pain.     Begin Date    AM    Noon    PM    Bedtime       pramipexole 0.5 MG tablet   Commonly known as:  MIRAPEX   Refills:  0   Dose:  0.5 mg    Instructions:  Take 0.5 mg by mouth 3 (three) times daily.     Begin Date    AM    Noon    PM    Bedtime       prochlorperazine 10 MG tablet   Commonly known as:  COMPAZINE   Quantity:  45 tablet   Refills:  0   Dose:  10 mg    Instructions:  Take 1 tablet (10 mg total) by mouth 3 (three) times daily as needed (with meals for nausea , 2 to 3  times a day prn). 1 Tablet Oral Before meals     Begin Date    AM    Noon    PM    Bedtime       SYNTHROID 50 MCG tablet   Refills:  0   Generic drug:  levothyroxine    Instructions:  1 tablet (50mg) by mouth every morning before breakfast. Excpet on Tuesdays take 2 tablets (100mg) before breakfast.     Begin Date    AM     Noon    PM    Bedtime                  Please bring to all follow up appointments:    1. A copy of your discharge instructions.  2. All medicines you are currently taking in their original bottles.  3. Identification and insurance card.    Please arrive 15 minutes ahead of scheduled appointment time.    Please call 24 hours in advance if you must reschedule your appointment and/or time.        Follow-up Information     Follow up with ROBLES Goldberg Iii, MD On 5/19/2017.    Specialty:  Vascular Surgery    Why:  suture removal    Contact information:    [address]  [facility] [address]  [phone]          Discharge Instructions     Future Orders    Activity as tolerated     Call MD for:  persistent dizziness or light-headedness     Call MD for:  redness, tenderness, or signs of infection (pain, swelling, redness, odor or green/yellow discharge around incision site)     Call MD for:  temperature >100.4     Diet general     Questions:    Total calories:      Fat restriction, if any:      Protein restriction, if any:      Na restriction, if any:      Fluid restriction:      Additional restrictions:      Remove dressing in 24 hours     VAS US Hemodialysis Access         Discharge Instructions         Do not bare any weight on left wrist.  Orthopedic team will call for follow-up appointment.      Wrist Fracture, General  You have a broken bone (fracture) in your wrist. This may be a small crack or chip in the bone. Or it may be a major break, with the broken parts pushed out of position. Wrist fractures are treated with a splint or cast. They take about 4 to 6 weeks to heal. Severe injuries may need surgery.    Home care  Follow these guidelines when caring for yourself at home:  · Keep your arm elevated to reduce pain and swelling. When sitting or lying down keep your arm above the level of your heart. You can do this by placing your arm on a pillow that rests on your chest or on a pillow at your side.  This is most important during the first 2 days (48 hours) after the injury.  · Put an ice pack on the injured area. Do this for 20 minutes every 1 to 2 hours the first day for pain relief. You can make an ice pack by wrapping a plastic bag of ice cubes in a thin towel. As the ice melts, be careful that the cast or splint doesnt get wet. Continue using the ice pack 3 to 4 times a day for the next 2 days. Then use the ice pack as needed to ease pain and swelling.  · Keep the cast or splint completely dry at all times. Bathe with your cast or splint out of the water. Protect it with a large plastic bag, rubber-banded at the top end. If a fiberglass cast or splint gets wet, you can dry it with a hair dryer.  · You may use acetaminophen or ibuprofen to control pain, unless another pain medicine was prescribed. If you have chronic liver or kidney disease, talk with your health care provider before using these medicines. Also talk with your provider if youve had a stomach ulcer or GI bleeding.  · Dont put creams or objects under the cast if you have itching.  Follow-up care  Follow up with your health care provider in 1 week, or as advised. This is to make sure the bone is healing the way it should. If a splint was put on, it will be changed to a cast during your follow-up visit. A cast may need to be changed at 2 to 3 weeks, as the swelling goes down.  If X-rays were taken, a radiologist will look at them. You will be told of any new findings that may affect your care.  When to seek medical advice  Call your health care provider right away if any of these occur:  · The plaster cast or splint becomes wet or soft  · The cast cracks  · Bad odor from the cast or wound fluid stains the cast  · The fiberglass cast or splint stays wet for more than 24 hours  · Tightness or pain under the cast or splint gets worse  · Fingers become swollen, cold, blue, numb, or tingly  · You cant move your fingers  · Skin around cast becomes  red  Date Last Reviewed: 2/16/2015 © 2000-2016 The [employer]. [address] [place] [address], [place] [address]. All rights reserved. This information is not intended as a substitute for professional medical care. Always follow your healthcare professional's instructions.            Arteriovenous (AV) Fistula for Dialysis  An AV fistula is a connection between an artery and a vein. For this procedure, an AV fistula is surgically created using an artery and a vein in your arm. (Your doctor will let you know if another site is to be used.) When the artery and vein are joined, blood flow increases from the artery into the vein. As a result, the vein enlarges over time. The enlarged vein provides easier access to the blood for dialysis (a treatment for kidney failure). This sheet explains the procedure and what to expect.     An AV fistula increases blood flow from the artery into the vein. Over time, the vein becomes stronger and enlarged.   Preparing for the Procedure  Prepare as you have been told. In addition:  · Tell your doctor about all medications you take. This includes over-the-counter drugs. It also includes herbs and other supplements. You may need to stop taking some or all of them before the procedure.  · Follow any directions youre given for not eating or drinking before the procedure.  · Do not allow anyone to draw blood from or take blood pressure on the arm that will have the fistula prior to the procedure.  The Day of the Procedure  The procedure takes about 1-2 hours. Youll likely go home the same day.  Before the procedure begins:  · An IV line is put into a vein in the arm or hand not being used for the procedure. This line supplies fluids and medications.  · To keep you free of pain during the procedure, youre given general anesthesia. This medication puts you into a state like deep sleep through the procedure. Or a nerve block may be used. This medication numbs the arm. With it, you may  also be given medication that makes you relaxed and drowsy through the procedure.  During the procedure:  · The skin over your arm may be injected with numbing medication.  · One or more small incisions are then made through the numbed skin. This depends on the size of your arm and the depth of the vein in your arm.  · The vein is attached to the selected artery.  · Any incisions made are then closed with sutures (stitches), staples, surgical glue, or strips of surgical tape.  After the procedure:  · Youll be asked to keep your arm raised (elevated) as often as possible for at least a week after the procedure.  · Youll be given medications to manage pain as needed.  · Your arm and hand will be checked to make sure blood is flowing through the fistula properly. The feeling of blood rushing through the fistula is called a thrill. It is somewhat similar to the purring of a cat. Youll be taught how to check for this feeling each day to make sure there are no problems with your fistula. Youll also be taught how to care for your fistula at home.  · When its time for you to leave the hospital, have an adult family member or friend ready to drive you home.  Recovering at Home  Once at home, follow all of the instructions youve been given. Be sure to:  · Take all medications as directed.  · Care for your incision as instructed.  · Check for signs of infection at the incision site (see below).  · Avoid heavy lifting and strenuous activities as directed.  · Monitor and care for your fistula as instructed.  · Do your hand and arm exercises as instructed. This usually involves squeezing a ball in your hand for a few minutes each hour.  Call Your Health Care Provider If You Have Any of the Following:  · Fever of 100.4°F or higher  · Signs of infection at the incision site, such as increased redness or swelling, warmth, worsening pain, bleeding, or foul-smelling drainage  · Lack of a thrill (you cant feel it)  · Pain or  numbness in your fingers, hand, or arm  · Bleeding, redness, or warmth around your fistula  · Sudden bulging of the fistula (more than usual; a slight bulge is normal)   Follow-Up  Your health care provider will check your fistula within 1 to 2 weeks after the procedure. It will likely take about 6 to 8 weeks for the fistula to enlarge enough to start dialysis. After that, make sure the fistula is checked each time you have dialysis. Your health care provider may also suggest checkups every 6 months.  Risks and Possible Complications Include:  · Failure of the fistula to work properly  · Long wait before the fistula is ready (up to 6 months)  · Coldness or numbness in the hand (due to blood flowing away from the hand and into the fistula)  · An unsightly bump under the skin (due to enlargement of the fistula)  · Prolonged bleeding from the fistula after dialysis  · Narrowing or weakening of the blood vessels used for the fistula  · Formation of blood clots in the blood vessels used for the fistula  · Risks of anesthesia or any other medications used during the procedure   Living with an AV Fistula  A problem, such as narrowing of the vein (stricture) or infection, can make the fistula unusable. If this happens, you may need other treatments to repair or make a new fistula. To protect your fistula, follow these and any other guidelines youre given:  · Check your fistula as often as your health care provider says. If you cant feel your thrill, let he or she know right away.  · Make sure your fistula is checked before each dialysis treatment.  · Dont let anyone draw blood from or take blood pressure on the arm that has the fistula.  · Wash your hands often and keep the area around your fistula clean.  · Dont sleep on the arm that has the fistula.  · Dont wear tight jewelry or a watch on the arm with your fistula.  · Protect your fistula from cuts, scrapes, or blows.  Date Last Reviewed: 11/26/2014  © 6686-6180 The  [employer]. [address] [place] [address]. All rights reserved. This information is not intended as a substitute for professional medical care. Always follow your healthcare professional's instructions.            Admission Information     Date & Time Provider Department CSN    5/17/2017  8:22 AM ROBLES Goldberg III, MD [facility] [id number]      Care Providers     Provider Role Specialty Primary office phone    ROBLES Goldberg III, MD Attending Provider Vascular Surgery [phone]    ROBLES Goldberg III, MD Surgeon  Vascular Surgery [phone]      Your Vitals Were     BP Pulse Temp Resp SpO2       99/41 67 97.5 °F (36.4 °C) (Temporal) 20 96%       Recent Lab Values     No lab values to display.      Allergies as of 5/17/2017        Reactions    Sulfamethoxazole-trimethoprim Other (See Comments)    Pt is unsure if she is actually allergic to the medicine.       Advance Directives     An advance directive is a document which, in the event you are no longer able to make decisions for yourself, tells your healthcare team what kind of treatment you do or do not want to receive, or who you would like to make those decisions for you.  If you do not currently have an advance directive, [facility] encourages you to create one.  For more information call:  [phone]), [phone] ([phone]),  or log on to [email or web address].        Smoking Cessation     If you would like to quit smoking:   You may be eligible for free services if you are a [place] resident and started smoking cigarettes before September 1, 1988.  Call the Smoking Cessation Trust (SCT) toll free at (008) 242-2561 or (394) 138-4735.   Call 6-800-QUIT-NOW if you do not meet the above criteria.   Contact us via email: [email or web address]   View our website for more information: [email or web address]        Language Assistance Services     ATTENTION: Language assistance  services are available, free of charge. Please call [phone].      ATENCIÓN: Si carmen aguilar, tiene a markham disposición servicios gratuitos de asistencia lingüística. Llame al [phone].     CHÚ Ý: N?u b?n nói Ti?ng Vi?t, có các d?ch v? h? tr? ngôn ng? mi?n phí dành cho b?n. G?i s? [phone].        Chronic Kindey Disease Education             [facility] Sign-Up     Activating your [facility] account is as easy as 1-2-3!     1) Visit [email or web address], select Sign Up Now, enter this activation code and your date of birth, then select Next.  [email or web address]  Expires: 7/1/2017 12:24 PM      2) Create a username and password to use when you visit [facility] in the future and select a security question in case you lose your password and select Next.    3) Enter your e-mail address and click Sign Up!    Additional Information  If you have questions, please e-mail [email or web address] or call [phone] to talk to our [facility] staff. Remember, [facility] is NOT to be used for urgent needs. For medical emergencies, dial 911.          [facility] complies with applicable Federal civil rights laws and does not discriminate on the basis of race, color, national origin, age, disability, or sex.

## 2017-05-17 NOTE — ANESTHESIA POSTPROCEDURE EVALUATION
Anesthesia Post Evaluation    Patient: Althea Hsieh    Procedure(s) Performed: Procedure(s) (LRB):  FISTULOGRAM PTA, L cephalic confluence (Left)    Final Anesthesia Type: general  Patient location during evaluation: PACU  Patient participation: Yes- Able to Participate  Level of consciousness: awake and alert and oriented  Post-procedure vital signs: reviewed and stable  Pain management: adequate  Airway patency: patent  PONV status at discharge: No PONV  Anesthetic complications: no      Cardiovascular status: hemodynamically stable  Respiratory status: unassisted, spontaneous ventilation and room air  Hydration status: euvolemic  Follow-up not needed.        Visit Vitals    BP (!) 97/44    Pulse 67    Temp 36.4 °C (97.5 °F) (Temporal)    Resp 20    SpO2 97%    Breastfeeding No       Pain/Zaira Score: Pain Assessment Performed: Yes (5/17/2017 12:30 PM)  Presence of Pain: complains of pain/discomfort (5/17/2017 12:30 PM)  Pain Rating Prior to Med Admin: 10 (5/17/2017  1:41 PM)  Zaira Score: 8 (5/17/2017 12:30 PM)

## 2017-05-17 NOTE — H&P (VIEW-ONLY)
"HISTORY OF PRESENT ILLNESS:  A 61-year-old female with end-stage renal disease   in whom I placed a left brachiocephalic AV fistula on 03/14/2014.    I have not seen her in over five years.    She reports having increased bleeding after dialysis runs.  She does dialyze at   home with the exception of Wednesdays and Saturdays.    It appears her last fistulogram was several years ago.    She is very concerned that she has "black things," coming out of various areas   of her skin.  She clearly bruises very easily and I wonder if this is just small   spontaneous bleeding.    PAST MEDICAL HISTORY:  1.  End-stage renal disease.  2.  Left internal carotid artery cavernous aneurysm.  3.  COPD.  4.  Hyperparathyroidism.  5.  Hypertension.  6.  Coronary artery disease, status post CABG June 2016.  7.  Peripheral arterial occlusive disease, status post fem-pop bypass in [place].    PAST SURGICAL HISTORY:  1.  As above.  2.  Hysterectomy.    FAMILY HISTORY:  Positive for cancer and heart disease.    SOCIAL HISTORY:  Current smoking.    MEDICATIONS:  Include aspirin and Plavix.  See EPIC for full list.    ALLERGIES:  Sulfa.    REVIEW OF SYSTEMS:  CARDIOVASCULAR:  Denies postprandial pain or DVT.  All other systems including eyes, ENT, respiratory, musculoskeletal, breast,   neurologic, psychiatric, lymph allergy and immune are negative.    PHYSICAL EXAMINATION:  VITAL SIGNS:  See nursing note.  GENERAL:  No acute distress.  RESPIRATORY:  Normal effort.  Clear to auscultation.  CARDIOVASCULAR:  Regular rhythm, nondisplaced PMI, no murmur.  EXTREMITIES:  Left arm shows multiple areas of ecchymosis bilaterally.  The   brachiocephalic fistula with moderate true aneurysmal changes over the first 4   to 5 cm and skin thinning and ulceration.  Notably, the fistula has moderate to   severe pulsatility in the upper arm suggestive of an outflow stenosis.  NEUROLOGIC:  Cranial nerves VII to XII are intact, 5/5 motor strength in all " "  extremities.    IMAGING:  Duplex of her fistula does not show any outflow stenosis with a flow   volume of 1.6 liters.    ASSESSMENT:  Clinically, she has a high outflow stenosis with increased bleeding   and a pulsatile fistula.  A fistulogram is warranted.    I believe that her concerns about "organisms" coming out of skin at these   ecchymotic areas may be punctate bleeding and I have suggested that this may   improve substantially if she can stop the Plavix.  I have suggested that she   talk with her heart doctor to see if it is okay for her to stop the Plavix.    PLAN:  Left fistulogram and possible intervention, 05/17/2017.    The patient understands the risks and rationale of the procedure and wishes to   proceed.      DEJA  dd: 05/09/2017 14:54:06 (CDT)  td: 05/09/2017 15:22:44 (CDT)  Doc ID   #[id number]  Job ID #[id number]    CC:       "

## 2017-05-17 NOTE — PLAN OF CARE
Patient states she fell on Monday and hurt left wrist; Left wrist is mildly swollen and painful to touch; Dr. Trotter notified.  She instructed patient to ice and elevate and follow up with primary care doctor. Family at bedside.  PreOp complete.

## 2017-05-17 NOTE — BRIEF OP NOTE
[facility]  Brief Operative Note     SUMMARY     Surgery Date: 5/17/2017     Surgeon(s) and Role:     * ROBLES Goldberg III, MD - Primary     * Oralia Trotter MD - Fellow    Assisting Surgeon: None    Pre-op Diagnosis:  Stenosis due to any device, implant or graft, initial encounter [T85.858A]    Post-op Diagnosis:  Post-Op Diagnosis Codes:     * Stenosis due to any device, implant or graft, initial encounter [T85.858A]    PROCEDURES:    1, PTA, L cephalic confluence (10x40 [place])  2. L fistulagram    Anesthesia: Local MAC    Description of the findings of the procedure: as above    Findings/Key Components: as above    Estimated Blood Loss: <4cc         Specimens:   Specimen     None          Discharge Note    SUMMARY     Admit Date: 5/17/2017    Discharge Date and Time: 5/17/17    Hospital Course (synopsis of major diagnoses, care, treatment, and services provided during the course of the hospital stay): successful outpatient procedure     Final Diagnosis: Post-Op Diagnosis Codes:     * Stenosis due to any device, implant or graft, initial encounter [T85.858A]    Disposition: home    Follow Up/Patient Instructions: Diet: renal  Act: ad too  FU: 1 week with AVF duplex     Medications: pre-op

## 2017-05-17 NOTE — NURSING TRANSFER
Nursing Transfer Note      5/17/2017     Transfer To: xray    Transfer via stretcher    Transfer with 2L to O2    Transported by RN    Notified: radiology tech    Patient to be transported back to [facility] after procedure.

## 2017-05-17 NOTE — TRANSFER OF CARE
Anesthesia Transfer of Care Note    Patient: Althea Hsieh    Procedure(s) Performed: Procedure(s) (LRB):  FISTULOGRAM PTA, L cephalic confluence (Left)    Patient location: [facility]    Anesthesia Type: MAC    Transport from OR: Transported from OR on room air with adequate spontaneous ventilation    Post pain: adequate analgesia    Post assessment: no apparent anesthetic complications    Post vital signs: stable    Level of consciousness: awake    Nausea/Vomiting: no nausea/vomiting    Complications: none    Transfer of care protocol was followed      Last vitals:   Visit Vitals    Breastfeeding No

## 2017-05-17 NOTE — PLAN OF CARE
Discharge instructions given and explained to patient with verbalization of understanding all instructions. Explained next time and doses of each medication. Patients v/s stable, denies n/v and tolerating po, rates pain level tolerable, IV removed, and family at bedside for patient discharge home.

## 2017-05-17 NOTE — ANESTHESIA RELEASE NOTE
Anesthesia Release from PACU Note    Patient: Althea Hsieh    Procedure(s) Performed: Procedure(s) (LRB):  FISTULOGRAM PTA, L cephalic confluence (Left)    Anesthesia type: GEN    Post pain: Adequate analgesia reported    Post assessment: no apparent anesthetic complications, tolerated procedure well and no evidence of recall    Post vital signs: BP (!) 97/44  Pulse 67  Temp 36.4 °C (97.5 °F) (Temporal)   Resp 20  SpO2 97%  Breastfeeding? No    Level of consciousness: awake, alert and oriented    Nausea/Vomiting: no nausea/no vomiting    Complications: none    Airway Patency: patent    Respiratory: unassisted, spontaneous ventilation, room air    Cardiovascular: stable and blood pressure at baseline    Hydration: euvolemic

## 2017-05-17 NOTE — ANESTHESIA PREPROCEDURE EVALUATION
05/17/2017  Althea Hsieh is a 61 y.o., female.    Anesthesia Evaluation    I have reviewed the Patient Summary Reports.    I have reviewed the Nursing Notes.   I have reviewed the Medications.     Review of Systems  Anesthesia Hx:  No problems with previous Anesthesia  History of prior surgery of interest to airway management or planning: Previous anesthesia: General  Denies Personal Hx of Anesthesia complications.   Cardiovascular:   Hypertension Past MI CAD   ECG has been reviewed.    Pulmonary:   COPD, severe    Renal/:   Chronic Renal Disease, ESRD    Hepatic/GI:  Hepatic/GI Normal    Neurological:  Neurology Normal    Endocrine:  Endocrine Normal    Psych:   Psychiatric History anxiety depression        Patient Active Problem List   Diagnosis    CAD (coronary artery disease)    ESRD on dialysis    Autosomal dominant polycystic kidney disease    Polycystic liver disease    Generalized weakness    Chronic nausea    Tobacco abuse    Hemodialysis patient    Accelerated hypertension    HTN (hypertension)    COPD (chronic obstructive pulmonary disease)    Polycystic kidney disease    Coronary angioplasty status    Diarrhea    ESRD (end stage renal disease)    Clostridium difficile colitis    E coli bacteremia    Hyperkalemia, diminished renal excretion    Fever    Hypoxia    Bacteremia due to Escherichia coli    S/P CABG (coronary artery bypass graft)    S/P femoral-popliteal bypass surgery    Stenosis of arteriovenous dialysis fistula     Past Medical History:   Diagnosis Date    Aneurysm of cavernous portion of left internal carotid artery     Anxiety associated with depression     COPD (chronic obstructive pulmonary disease)     ESRD (end stage renal disease) on dialysis     Hyperparathyroidism, secondary renal     Hypertension     Myocardial infarct     age [age] years      PLD (polycystic liver disease)     Polycystic kidney disease, autosomal dominant     Thyroid disease      Past Surgical History:   Procedure Laterality Date     SECTION, LOW TRANSVERSE  ,    CORONARY ANGIOPLASTY WITH STENT PLACEMENT      Has 2  stents    DIALYSIS FISTULA CREATION      HYSTERECTOMY           Physical Exam  General:  Well nourished    Airway/Jaw/Neck:  Airway Findings: Mouth Opening: Normal Tongue: Normal  General Airway Assessment: Adult  Mallampati: II  TM Distance: Normal, at least 6 cm  Jaw/Neck Findings:  Neck ROM: Normal ROM      Dental:  Dental Findings: In tact   Chest/Lungs:  Chest/Lungs Findings: Clear to auscultation     Heart/Vascular:  Heart Findings: Rate: Normal  Rhythm: Regular Rhythm  Sounds: Normal        Mental Status:  Mental Status Findings:  Cooperative, Alert and Oriented         Anesthesia Plan  Type of Anesthesia, risks & benefits discussed:  Anesthesia Type:  general  Patient's Preference: general  Intra-op Monitoring Plan: standard ASA monitors  Intra-op Monitoring Plan Comments:   Post Op Pain Control Plan:   Post Op Pain Control Plan Comments:   Induction:   IV  Beta Blocker:  Patient is on a Beta-Blocker and has received one dose within the past 24 hours (No further documentation required).       Informed Consent: Patient understands risks and agrees with Anesthesia plan.  Questions answered. Anesthesia consent signed with patient.  ASA Score: 4     Day of Surgery Review of History & Physical:    H&P update referred to the surgeon.         Ready For Surgery From Anesthesia Perspective.

## 2017-06-16 ENCOUNTER — DOCUMENTATION ONLY (OUTPATIENT)
Dept: NEPHROLOGY | Facility: CLINIC | Age: 62
End: 2017-06-16

## 2017-06-16 NOTE — PROGRESS NOTES
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 She Had CABG last year and L leg bypass and has had hopsitalizations for wound infections. R leg stent placed recently during hospitalization and possible bypass in that leg in the future.  Her blood pressures are better on midodrine.  Recent EGD neg. Recently fell and broke her wrist-seeing ortho soon. C/o cramps-increase DW as she is close to 47.5 kg.  Slightly bled form her site today and ahd to end her treatment half an hour short. Currently denies bleeding or dark stools.          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     SOCIAL HISTORY: She smokes one and half pack a day, which is down from three packs a day.      REVIEW OF SYSTEMS: Negative. No chest pain, no shortness of breath.  PHYSICAL EXAMINATION: 116/56  T: 98.6  DW:47.5 kg W: 47.1 kg P: 89  VITAL SIGNS: Reviewed.  GENERAL: The patient does not appear to be in any acute distress.  CARDIOVASCULAR: Rate and rhythm regular.  LUNGS: Clear to auscultation bilaterally.  ABD:  Slightly tender-distended.  EXTREMITIES: Access stable. Gonzales ext with trace to 1+ edema. L LE healing well with bypass off wound vac. R leg with ulcers healing well.  acess stable. No erythema.     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     Labs pending.  1. ESRD. Kt/v 2.4 UF of 0.5L-1.5L Fluid retsriction emphasized. C/o cramps-increase DW as she is close to 47.5 kg. Change DW to 47.5 kg. Changed K to 1 k bath-low K diet. K better.  2. Hypertension. Blood pressures are stable on metoprolol.  On midodrine.   3. Anemia: stable but low.  had egd recently but no colonoscopy. Rec setting up colonoscopy as hgb more stable  now. On mircera 150-increase to 200. Small dose of iron with high ferritin(stable) had C/o dark stools in the past- Gi appoint ASAP for colonoscopy.  Rec ED evaluation if she felt bad or noticed bleeding.  Gi does not want to do colonoscopy with her instability in the past. Repeat hgb today.  4. Nutrition: albumin is improving . She is on LiquaCel. High protein diet discussed.  5. Renal osteodystrophy: phos high-low phos diet emphasized on fosrenol 2 packs with each meal. Off carafate started by Gi but held b/c of higher aluminum level. PTH stable-off calcitriol. Off Lasix as she does not urinate much. aluminum level improved off carafate.  C/o hematuira-although doesn't make urine-streaks of blood-rec urology eval although it could be from ruptured cysts. Heavy smoker-would need cystoscopy although it was neg from 2-3 years ago per pt. She would like to hold off until other issues are taken care of. Discussed the risks.   L arm access-recently had angioplasty.     plt ct low stable -hematology eval in hospital they thought it was due to abx. If no improvement, will need to refer to hematology.

## 2017-07-14 ENCOUNTER — DOCUMENTATION ONLY (OUTPATIENT)
Dept: NEPHROLOGY | Facility: CLINIC | Age: 62
End: 2017-07-14

## 2017-07-14 NOTE — PROGRESS NOTES
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 She Had CABG last year and L leg bypass and has had hopsitalizations for wound infections. R leg stent placed recently during hospitalization and possible bypass in that leg in the future.  Her blood pressures are better on midodrine.  Recent EGD neg. Recently fell this month and bruised her face and knee-stable.  Currently denies bleeding or dark stools.           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     SOCIAL HISTORY: She smokes one and half pack a day, which is down from three packs a day.      REVIEW OF SYSTEMS: Negative. No chest pain, no shortness of breath.  PHYSICAL EXAMINATION: 112/50  T: 99.3  DW:47.5 kg W: 47.1 kg P: 67  VITAL SIGNS: Reviewed.  GENERAL: The patient does not appear to be in any acute distress.  CARDIOVASCULAR: Rate and rhythm regular.  LUNGS: Clear to auscultation bilaterally.  ABD:  Slightly tender-distended.  EXTREMITIES: Access stable. Gonzales ext with trace to 1+ edema. L LE healing well with bypass off wound vac-improving.  R leg with ulcers healing well.  acess stable. No erythema. L thigh scratches-dry skin.     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     Labs pending.  1. ESRD. Kt/v 2.3  UF of 0.5L-1.0L Fluid retsriction emphasized. cramps better with increase in DW as she is close to 47.5 kg. Changed K to 1 k bath-low K diet. K better.  2. Hypertension. Blood pressures are stable on metoprolol.  On midodrine.   3. Anemia: stable and improved.  had egd recently but no colonoscopy. Rec setting up colonoscopy as hgb more stable now. On mircera 200.  Small dose of iron with high ferritin(stable) had C/o dark stools in  the past- Gi appoint ASAP for colonoscopy.   Gi does not want to do colonoscopy with her instability in the past.   4. Nutrition: albumin is lower. She is on LiquaCel. High protein diet discussed.  5. Renal osteodystrophy: phos high-low phos diet emphasized on fosrenol 2 packs with each meal. Off carafate started by Gi but held b/c of higher aluminum level. PTH high-start calcitriol 0.25 mcg 5 times a week. . Off Lasix as she does not urinate much. aluminum level improved off carafate. Did not take her binders-encouraged her to take it.  C/o hematuira-although doesn't make urine-streaks of blood-rec urology eval although it could be from ruptured cysts. Heavy smoker-would need cystoscopy although it was neg from 2-3 years ago per pt. She would like to hold off until other issues are taken care of. Discussed the risks.   L arm access-recently had angioplasty.     plt ct low stable -hematology eval in hospital they thought it was due to abx. If no improvement, will need to refer to hematology.

## 2017-07-19 ENCOUNTER — TELEPHONE (OUTPATIENT)
Dept: VASCULAR SURGERY | Facility: CLINIC | Age: 62
End: 2017-07-19

## 2017-07-19 NOTE — TELEPHONE ENCOUNTER
----- Message from Nathalie Rodriguez sent at 7/19/2017  8:20 AM CDT -----  Contact: self  Althea Called and wanted to schedule a appointment with  for today her fistula is bleeding out  And she  also had general questions. Please call Althea @ [phone]                                                Thanks :)

## 2017-07-21 ENCOUNTER — TELEPHONE (OUTPATIENT)
Dept: VASCULAR SURGERY | Facility: CLINIC | Age: 62
End: 2017-07-21

## 2017-07-21 ENCOUNTER — HOSPITAL ENCOUNTER (OUTPATIENT)
Dept: VASCULAR SURGERY | Facility: CLINIC | Age: 62
Discharge: HOME | End: 2017-07-21
Attending: SURGERY
Payer: MEDICARE

## 2017-07-21 ENCOUNTER — OFFICE VISIT (OUTPATIENT)
Dept: VASCULAR SURGERY | Facility: CLINIC | Age: 62
End: 2017-07-21
Payer: MEDICARE

## 2017-07-21 VITALS
TEMPERATURE: 98 F | DIASTOLIC BLOOD PRESSURE: 45 MMHG | HEIGHT: 58 IN | BODY MASS INDEX: 21.83 KG/M2 | HEART RATE: 70 BPM | WEIGHT: 104 LBS | SYSTOLIC BLOOD PRESSURE: 94 MMHG

## 2017-07-21 DIAGNOSIS — N18.6 ESRD ON DIALYSIS: ICD-10-CM

## 2017-07-21 DIAGNOSIS — N18.6 ESRD ON DIALYSIS: Primary | Chronic | ICD-10-CM

## 2017-07-21 DIAGNOSIS — Z99.2 ESRD ON DIALYSIS: ICD-10-CM

## 2017-07-21 DIAGNOSIS — Z72.0 TOBACCO ABUSE: ICD-10-CM

## 2017-07-21 DIAGNOSIS — Z99.2 ESRD ON DIALYSIS: Primary | Chronic | ICD-10-CM

## 2017-07-21 PROCEDURE — 99999 PR PBB SHADOW E&M-EST. PATIENT-LVL III: CPT | Mod: PBBFAC,,, | Performed by: SURGERY

## 2017-07-21 PROCEDURE — 93990 DOPPLER FLOW TESTING: CPT | Mod: 26,S$PBB,, | Performed by: SURGERY

## 2017-07-21 PROCEDURE — 99213 OFFICE O/P EST LOW 20 MIN: CPT | Mod: PBBFAC | Performed by: SURGERY

## 2017-07-21 PROCEDURE — 99213 OFFICE O/P EST LOW 20 MIN: CPT | Mod: S$PBB,,, | Performed by: SURGERY

## 2017-07-21 RX ORDER — MIDODRINE HYDROCHLORIDE 10 MG/1
TABLET ORAL
Refills: 2 | COMMUNITY
Start: 2017-07-14

## 2017-07-21 RX ORDER — PANTOPRAZOLE SODIUM 40 MG/1
40 TABLET, DELAYED RELEASE ORAL DAILY
COMMUNITY

## 2017-07-21 RX ORDER — LIDOCAINE AND PRILOCAINE 25; 25 MG/G; MG/G
CREAM TOPICAL
Refills: 3 | COMMUNITY
Start: 2017-07-14

## 2017-07-21 RX ORDER — NITROGLYCERIN 0.4 MG/1
0.4 TABLET SUBLINGUAL EVERY 5 MIN PRN
COMMUNITY

## 2017-07-21 RX ORDER — OXYCODONE HYDROCHLORIDE 30 MG/1
30 TABLET, FILM COATED, EXTENDED RELEASE ORAL EVERY 12 HOURS
Status: ON HOLD | COMMUNITY
End: 2018-01-01 | Stop reason: ALTCHOICE

## 2017-07-21 RX ORDER — ATORVASTATIN CALCIUM 80 MG/1
TABLET, FILM COATED ORAL
Refills: 5 | COMMUNITY
Start: 2017-05-12

## 2017-07-21 RX ORDER — LEVOCARNITINE 330 MG/1
330 TABLET ORAL DAILY
Refills: 1 | COMMUNITY
Start: 2017-06-30

## 2017-07-21 RX ORDER — CALCITRIOL 0.25 UG/1
CAPSULE ORAL
Refills: 6 | COMMUNITY
Start: 2017-07-17 | End: 2017-01-01 | Stop reason: ALTCHOICE

## 2017-07-21 RX ORDER — CILOSTAZOL 100 MG/1
TABLET ORAL
Refills: 5 | Status: ON HOLD | COMMUNITY
Start: 2017-07-13 | End: 2018-01-01 | Stop reason: HOSPADM

## 2017-07-21 RX ORDER — AMIODARONE HYDROCHLORIDE 200 MG/1
TABLET ORAL
Refills: 60 | COMMUNITY
Start: 2017-07-13

## 2017-07-21 NOTE — PROGRESS NOTES
HISTORY OF PRESENT ILLNESS:  Althea Hsieh 61 y.o. female with end-stage renal disease in whom I placed a left brachiocephalic AV fistula on 2014.     PROCEDURES:   1. 3/14/14: Left brachiocephalic AVF  2. 5  PTA, L cephalic confluence (10x40 [place]); Left fistulagram    She reports having increased bleeding during dialysis runs last week. She changed the access sites and stopped using the button holes and the bleeding issue resolved. She does dialyze at home with the exception of  and .   She holds pressure after dialysis for 10-15 minutes which is normal for her.  She is left handed.     Past Medical History:   Diagnosis Date    Aneurysm of cavernous portion of left internal carotid artery     Anxiety associated with depression     COPD (chronic obstructive pulmonary disease)     ESRD (end stage renal disease) on dialysis     Hyperparathyroidism, secondary renal     Hypertension     Myocardial infarct     age [age] years     PLD (polycystic liver disease)     Polycystic kidney disease, autosomal dominant     Thyroid disease    PAST MEDICAL HISTORY:  1.  End-stage renal disease.  2.  Left internal carotid artery cavernous aneurysm.  3.  COPD.  4.  Hyperparathyroidism.  5.  Hypertension.  6.  Coronary artery disease, status post CABG 2016.  7.  Peripheral arterial occlusive disease, status post fem-pop bypass in [place].    Past Surgical History:   Procedure Laterality Date     SECTION, LOW TRANSVERSE  ,    CORONARY ANGIOPLASTY WITH STENT PLACEMENT      Has 2  stents    DIALYSIS FISTULA CREATION      HYSTERECTOMY         Family History   Problem Relation Age of Onset    Hypertension Mother     Kidney disease Father     Kidney disease Sister     Kidney disease Brother     Alcohol abuse Brother     Cancer Brother     Heart disease Brother     Polycystic kidney disease Sister      Stroke       Social History     Social History     Marital status:      Spouse name: N/A    Number of children: 2    Years of education: N/A     Occupational History    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      alprazolam (XANAX) 1 MG tablet Take 1 mg by mouth 3 (three) times daily as needed for Anxiety.       amiodarone (PACERONE) 200 MG Tab TAKE ONE TABLET BY MOUTH DAILY FOR HEART rhythm  60    ARIPIPRAZOLE (ABILIFY ORAL) Take 1 tablet by mouth once daily.      atorvastatin (LIPITOR) 80 MG tablet TAKE ONE TABLET BY MOUTH DAILY after supper FOR CHOLESTEROL  5    B COMPLEX W-C NO.20/FOLIC ACID (RENAL CAPS ORAL) Take by mouth.      calcitRIOL (ROCALTROL) 0.25 MCG Cap TAKE ONE CAPSULE BY MOUTH FIVE DAYS A WEEK  6    cilostazol (PLETAL) 100 MG Tab TAKE ONE TABLET BY MOUTH TWICE DAILY FOR -leg circulation  5    clopidogrel (PLAVIX) 75 mg tablet Take 75 mg by mouth once daily.      DIPHENOXYLATE HCL/ATROPINE (DIPHENOXYLATE-ATROPINE ORAL) Take by mouth.      escitalopram (LEXAPRO) 20 MG tablet Take 20 mg by mouth. 1 Tablet Oral Every day      FOSRENOL 1,000 mg PwPk       levocarnitine (CARNITOR) 330 mg Tab Take 330 mg by mouth once daily.  1    levothyroxine (SYNTHROID) 50 MCG tablet 1 tablet (50mg) by mouth every morning before breakfast. Excpet on Tuesdays take 2 tablets (100mg) before breakfast.      lidocaine-prilocaine (EMLA) cream APPLY TO AREA ONE TO TWO hours prior TO dialysis  treatment  3    metoprolol succinate (TOPROL-XL) 50 MG 24 hr tablet Take 50 mg by mouth once daily.      midodrine (PROAMATINE) 5 MG Tab TAKE ONE TABLET BY MOUTH THREE TIMES DAILY TO HELP RAISE BLOOD PRESSURE  2    nitroGLYCERIN (NITROSTAT) 0.4 MG SL tablet Place 0.4 mg under the tongue every 5 (five) minutes as needed for Chest pain.      oxycodone (OXYCONTIN) 30 mg TR12 12 hr tablet Take 30 mg by mouth every 12 (twelve) hours.      pantoprazole (PROTONIX) 40 MG tablet Take 40 mg by mouth once daily.      pramipexole (MIRAPEX) 0.5 MG tablet Take 0.5 mg by mouth 3 (three) times daily.      prochlorperazine (COMPAZINE) 10 MG tablet Take 1 tablet (10 mg total) by mouth 3 (three) times daily as needed (with meals for nausea , 2 to 3  times a day prn). 1 Tablet Oral Before meals 45 tablet 0    aspirin (ECOTRIN) 81 MG EC tablet Take 1 tablet (81 mg total) by mouth once daily. 365 tablet 0     No current facility-administered medications for this visit.      ALLERGIES:  Sulfa.    REVIEW OF SYSTEMS:  CARDIOVASCULAR:  Denies postprandial pain or DVT.  All other systems including eyes, ENT, respiratory, musculoskeletal, breast,   neurologic, psychiatric, lymph allergy and immune are negative.  EXTREMITIES: recently broke left wrist a month ago; wearing brace; no left arm swelling or pain    PHYSICAL EXAMINATION:  VITAL SIGNS:    Vitals:    07/21/17 1149   BP: (!) 94/45   Pulse: 70   Temp: 98.3 °F (36.8 °C)      GENERAL:  No acute distress.  RESPIRATORY:  Normal effort.  Clear to auscultation.  CARDIOVASCULAR:  Regular rhythm, nondisplaced PMI, no murmur.  EXTREMITIES:  The brachiocephalic fistula with moderate true aneurysmal changes over the first 4 to 5 cm and skin thinning and ulceration.  Notably, the fistula has moderate to severe pulsatility in the upper arm suggestive of an outflow stenosis.  NEUROLOGIC:  Cranial nerves VII to XII are intact, 5/5 motor strength in all extremities. Left wrist is limited in ROM  and strength.     IMAGING:  Duplex of her fistula does not show any outflow stenosis with a flow volume of  1.8 liters (previous 1.6 liters.)    1. ESRD on dialysis  VAS US Hemodialysis Access   2. Tobacco abuse       ASSESSMENT:  Well functioning Left AVF.     PLAN:  1. F/u in 6 months with HD access duplex     LARRY Caceres, APRN, FNP-BC  Nurse Practitioner  Vascular and Endovascular Surgery    VASCULAR STAFF    I have personally taken the history and examined this patient and agree with the resident's note as stated above    ANA Goldberg III, MD, FACS  Professor and Chief, Vascular and Endovascular Surgery

## 2017-07-21 NOTE — TELEPHONE ENCOUNTER
Pt calling RE: f/u PAD. Pt currently seeing Dr. Goldberg for Hemodialysis access. Pt to check with spouse and call back if wants f/u appt with Dr. Olivier or wants to continue care at [facility].

## 2017-09-15 NOTE — PROGRESS NOTES
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 She Had CABG last year and L leg bypass and has had hopsitalizations for wound infections. R leg stent placed recently during hospitalization and possible bypass in that leg in the future.  Her blood pressures are better on midodrine.  Recent EGD neg.  Currently denies bleeding or dark stools. Hgb improving. No recent hopsitalizations in the last 3 months.      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     SOCIAL HISTORY: She smokes one and half pack a day, which is down from three packs a day.      REVIEW OF SYSTEMS: Negative. No chest pain, no shortness of breath.  PHYSICAL EXAMINATION: 134/65   T: 96.3   DW:47.5 kg W: 47.9  kg P:70     GENERAL: The patient does not appear to be in any acute distress.  CARDIOVASCULAR: Rate and rhythm regular.  LUNGS: Clear to auscultation bilaterally.  ABD:  Slightly tender-distended.  EXTREMITIES: Access stable. Gonzales ext with trace edema. L LE healed.  R leg with ulcers healing well.  acess stable-good thrill. No erythema.         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     Labs pending.  1. ESRD. Kt/v 2.3  UF of 0.5L-1.0L.  Fluid retsriction emphasized. cramps better with increase in DW as she is close to 47.5 kg.  Relax K restriction slightly as K better.  2. Hypertension. Blood pressures are stable on metoprolol.  On midodrine.   3. Anemia:  Decrease mircera to 200  A month.  had egd recently but no colonoscopy. Rec setting up colonoscopy as hgb more stable now.  Small dose of iron with high ferritin(stable) had C/o dark stools in the past- Gi appoint ASAP for colonoscopy.     4. Nutrition: albumin is higher. She  is on LiquaCel. High protein diet discussed.  5. Renal osteodystrophy: phos high but better-low phos diet emphasized on fosrenol 2 packs with each meal. Off carafate started by Gi but held b/c of higher aluminum level. PTH improved -on  calcitriol 0.25 mcg 5 times a week-increase calcitriol to daily to help with low ca and PTH. Off Lasix as she does not urinate much. aluminum level improved off carafate.   C/o hematuria -although doesn't make urine-streaks of blood-rec urology eval although it could be from ruptured cysts. Heavy smoker-would need cystoscopy although it was neg from 2-3 years ago per pt. Discussed the risks.   L arm access-recently had angioplasty.     plt ct low stable -hematology eval in hospital they thought it was due to abx. If no improvement, will need to refer to hematology.

## 2017-10-17 NOTE — PROGRESS NOTES
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    Her blood pressures are better on midodrine.  Recent EGD neg.  Currently denies bleeding or dark stools. Hgb improving. No recent hopsitalizations in the last 6 months.      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     SOCIAL HISTORY: She smokes one and half pack a day, which is down from three packs a day.      REVIEW OF SYSTEMS: Negative. No chest pain, no shortness of breath.  PHYSICAL EXAMINATION: 108/46  T: 97.5   DW:47.5 kg W: 48  kg P:67     GENERAL: The patient does not appear to be in any acute distress.  CARDIOVASCULAR: Rate and rhythm regular.  LUNGS: Clear to auscultation bilaterally.  ABD:  Slightly tender-distended.  EXTREMITIES: Access stable. Gonzales ext with trace edema. L LE healed.    acess stable-good thrill. No erythema.         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     Labs pending.  1. ESRD. Kt/v 2.1  UF of 0.5L-0.8L.  Fluid retsriction emphasized. cramps better with increase in DW as she is close to 47.5 kg.  Relax K restriction slightly as K better.  2. Hypertension. Blood pressures are stable on metoprolol.  On midodrine.   3. Anemia:  Decrease mircera to 150  a  month.  Had egd recently but no colonoscopy. Rec setting up colonoscopy as hgb more stable now.  Will receive iron- Gi appoint ASAP for colonoscopy.     4. Nutrition: albumin is higher. She is on LiquaCel. High protein diet discussed.  5. Renal osteodystrophy: phos high but  better-low phos diet emphasized on fosrenol 2 packs with each meal. Take fosrenol with snack. Off carafate started by Gi but held b/c of higher aluminum level. PTH improved -on  calcitriol 0.25 mcg  daily to help with low ca and PTH. Off Lasix as she does not urinate much. aluminum level improved off carafate.   C/o hematuria -although doesn't make urine-streaks of blood-rec urology eval although it could be from ruptured cysts. Heavy smoker-would need cystoscopy although it was neg from 2-3 years ago per pt. Discussed the risks.   L arm access-recently had angioplasty.     plt ct low stable -hematology eval in hospital they thought it was due to abx. If no improvement, will need to refer to hematology.

## 2017-11-08 PROBLEM — J18.9 LEFT LOWER LOBE PNEUMONIA: Status: ACTIVE | Noted: 2017-01-01

## 2017-11-08 PROBLEM — L03.119 CELLULITIS OF LOWER EXTREMITY: Status: ACTIVE | Noted: 2017-01-01

## 2017-11-09 PROBLEM — R78.81 BACTEREMIA DUE TO STREPTOCOCCUS: Status: ACTIVE | Noted: 2017-01-01

## 2017-11-09 PROBLEM — D69.6 THROMBOCYTOPENIA: Status: ACTIVE | Noted: 2017-01-01

## 2017-11-09 PROBLEM — B95.5 BACTEREMIA DUE TO STREPTOCOCCUS: Status: ACTIVE | Noted: 2017-01-01

## 2017-11-29 NOTE — PROGRESS NOTES
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    Her blood pressures are better on midodrine.  Recent EGD neg.  Currently denies bleeding or dark stools. Hgb improving. 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     SOCIAL HISTORY: She smokes one and half pack a day, which is down from three packs a day.      REVIEW OF SYSTEMS: Negative. No chest pain, no shortness of breath.  PHYSICAL EXAMINATION: 109/70  T: 97.5   DW:47.5 kg W: 50.5   kg P:68     GENERAL: The patient does not appear to be in any acute distress.  CARDIOVASCULAR: Rate and rhythm regular.  LUNGS: Clear to auscultation bilaterally.  ABD:  Slightly tender-distended.  EXTREMITIES: Access stable. Gonzales ext with trace edema. L LE healed.    acess stable-good thrill. No erythema. RLE with no cellulitis.        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     Labs pending.  1. ESRD. Kt/v 2.1 pt did not bring her tx sheets.   Fluid retsriction emphasized. cramps better with increase in DW as she is close to 47.5 kg.  Relax K restriction slightly as K better.  2. Hypertension. Blood pressures are stable on metoprolol.  On midodrine.   3. Anemia:  con't  mircera to 150  a  month.  Had egd recently but no colonoscopy. Rec setting up colonoscopy as hgb more stable now. Does not need iron - Gi appoint ASAP for colonoscopy.     4. Nutrition: albumin is lower with recent infection. She is  on LiquaCel. High protein diet discussed.  5. Renal osteodystrophy: phos  better-low phos diet emphasized on fosrenol 2 packs with each meal. Take fosrenol with snack. Off carafate started by Gi but held b/c of higher aluminum level. PTH improved -on  calcitriol 0.25 mcg  daily to help with low ca and PTH. Off Lasix as she does not urinate much. aluminum level improved off carafate.   C/o hematuria -although doesn't make urine-streaks of blood-rec urology eval although it could be from ruptured cysts. Heavy smoker-would need cystoscopy although it was neg from 2-3 years ago per pt. Discussed the risks.   L arm access-recently had angioplasty.     plt ct low stable -hematology eval in hospital they thought it was due to abx. If no improvement, will need to refer to hematology.   Repeat blood cltx requested by ID.

## 2017-12-08 NOTE — PROGRESS NOTES
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    Her blood pressures are better on midodrine.  Recent EGD neg.  Currently denies bleeding or dark stools. Hgb slightly lower but stabilizing after the drop. Recheck hgb next week. Back to DW after doing home HD. Guiac cards.Rec colonoscopy.       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     SOCIAL HISTORY: She smokes one and half pack a day, which is down from three packs a day.      REVIEW OF SYSTEMS: Negative. No chest pain, no shortness of breath.  PHYSICAL EXAMINATION: 116/42  T: 97   DW:47.5 kg W: 48.4  kg P: 58     GENERAL: The patient does not appear to be in any acute distress.  CARDIOVASCULAR: Rate and rhythm regular.  LUNGS: Clear to auscultation bilaterally.  ABD:  Slightly tender-distended.  EXTREMITIES: RUE Access stable. Gonzales ext with trace edema. L LE healed.    acess stable-good thrill. No erythema.         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     Labs pending.  1. ESRD. Kt/v 2.1  UF of under 0.5L.  Fluid retsriction emphasized. cramps better with increase in DW as she is close to 47.5 kg.  Relax K restriction slightly as K better.  2. Hypertension. Blood pressures are stable on metoprolol.  On midodrine.   3. Anemia:  Increase  mircera to 200 mcg  a  month.  Had egd recently but no colonoscopy. Rec setting up colonoscopy as hgb more stable now.  Will receive iron- Gi appoint ASAP for colonoscopy.   2 gm drop in hgb-repeat next week.   Stabilizing but concerned about GI blled.  guiac cards.  Discussed with family.  4. Nutrition: albumin is higher. She is on LiquaCel. High protein diet discussed.  5. Renal osteodystrophy: phos high but better-low phos diet emphasized on fosrenol 2 packs with each meal. switch to 2 auryxia with each meal-pt trefers the pill.  Off carafate started by Gi but held b/c of higher aluminum level. PTH improved -on  calcitriol 0.25 mcg  daily to help with low ca and PTH. Off Lasix as she does not urinate much. aluminum level improved off carafate.   Previously C/o hematuria -although doesn't make urine-streaks of blood-rec urology eval although it could be from ruptured cysts. Heavy smoker-would need cystoscopy although it was neg from 2-3 years ago per pt. Discussed the risks.   L arm access-recently had angioplasty.     plt ct low stable -hematology eval in hospital they thought it was due to abx. If no improvement, will need to refer to hematology.

## 2018-01-01 ENCOUNTER — DOCUMENTATION ONLY (OUTPATIENT)
Dept: NEPHROLOGY | Facility: CLINIC | Age: 63
End: 2018-01-01

## 2018-01-01 ENCOUNTER — TELEPHONE (OUTPATIENT)
Dept: ADMINISTRATIVE | Facility: CLINIC | Age: 63
End: 2018-01-01

## 2018-01-01 ENCOUNTER — HOSPITAL ENCOUNTER (INPATIENT)
Facility: HOSPITAL | Age: 63
LOS: 9 days | Discharge: HOME HEALTH | DRG: 280 | End: 2018-01-23
Attending: EMERGENCY MEDICINE | Admitting: INTERNAL MEDICINE
Payer: MEDICARE

## 2018-01-01 ENCOUNTER — SURGERY (OUTPATIENT)
Age: 63
End: 2018-01-01

## 2018-01-01 ENCOUNTER — PATIENT OUTREACH (OUTPATIENT)
Dept: ADMINISTRATIVE | Facility: CLINIC | Age: 63
End: 2018-01-01

## 2018-01-01 VITALS
HEIGHT: 60 IN | OXYGEN SATURATION: 96 % | RESPIRATION RATE: 25 BRPM | SYSTOLIC BLOOD PRESSURE: 126 MMHG | HEART RATE: 58 BPM | BODY MASS INDEX: 20.21 KG/M2 | WEIGHT: 102.94 LBS | TEMPERATURE: 98 F | DIASTOLIC BLOOD PRESSURE: 54 MMHG

## 2018-01-01 DIAGNOSIS — I21.4 NSTEMI (NON-ST ELEVATED MYOCARDIAL INFARCTION): ICD-10-CM

## 2018-01-01 DIAGNOSIS — I51.9 HEART DISORDER: ICD-10-CM

## 2018-01-01 DIAGNOSIS — R57.9 SHOCK: ICD-10-CM

## 2018-01-01 DIAGNOSIS — R07.9 CHEST PAIN: ICD-10-CM

## 2018-01-01 DIAGNOSIS — I21.19 INFERIOR MYOCARDIAL INFARCTION: ICD-10-CM

## 2018-01-01 DIAGNOSIS — M89.9 CHRONIC KIDNEY DISEASE-MINERAL AND BONE DISORDER: ICD-10-CM

## 2018-01-01 DIAGNOSIS — I20.0 UNSTABLE ANGINA: ICD-10-CM

## 2018-01-01 DIAGNOSIS — R57.1 HYPOVOLEMIC SHOCK: ICD-10-CM

## 2018-01-01 DIAGNOSIS — E83.9 CHRONIC KIDNEY DISEASE-MINERAL AND BONE DISORDER: ICD-10-CM

## 2018-01-01 DIAGNOSIS — N18.9 CHRONIC KIDNEY DISEASE-MINERAL AND BONE DISORDER: ICD-10-CM

## 2018-01-01 DIAGNOSIS — E87.79 OTHER HYPERVOLEMIA: ICD-10-CM

## 2018-01-01 DIAGNOSIS — I24.9 ACS (ACUTE CORONARY SYNDROME): Primary | ICD-10-CM

## 2018-01-01 DIAGNOSIS — N18.6 ESRD ON DIALYSIS: Chronic | ICD-10-CM

## 2018-01-01 DIAGNOSIS — I49.9 ABNORMAL HEART RHYTHM: ICD-10-CM

## 2018-01-01 DIAGNOSIS — Z99.2 ESRD ON DIALYSIS: Chronic | ICD-10-CM

## 2018-01-01 DIAGNOSIS — R09.02 HYPOXIA: ICD-10-CM

## 2018-01-01 LAB
ABO + RH BLD: NORMAL
ALBUMIN SERPL BCP-MCNC: 2 G/DL
ALBUMIN SERPL BCP-MCNC: 2.2 G/DL
ALBUMIN SERPL BCP-MCNC: 2.3 G/DL
ALBUMIN SERPL BCP-MCNC: 2.3 G/DL
ALBUMIN SERPL BCP-MCNC: 2.4 G/DL
ALBUMIN SERPL BCP-MCNC: 2.5 G/DL
ALBUMIN SERPL BCP-MCNC: 2.6 G/DL
ALBUMIN SERPL BCP-MCNC: 2.7 G/DL
ALBUMIN SERPL BCP-MCNC: 2.7 G/DL
ALBUMIN SERPL BCP-MCNC: 2.8 G/DL
ALBUMIN SERPL BCP-MCNC: 2.8 G/DL
ALBUMIN SERPL BCP-MCNC: 2.9 G/DL
ALBUMIN SERPL BCP-MCNC: 2.9 G/DL
ALBUMIN SERPL BCP-MCNC: 3 G/DL
ALBUMIN SERPL BCP-MCNC: 3.1 G/DL
ALLENS TEST: ABNORMAL
ALP SERPL-CCNC: 101 U/L
ALP SERPL-CCNC: 108 U/L
ALP SERPL-CCNC: 111 U/L
ALP SERPL-CCNC: 117 U/L
ALP SERPL-CCNC: 119 U/L
ALP SERPL-CCNC: 121 U/L
ALP SERPL-CCNC: 127 U/L
ALP SERPL-CCNC: 90 U/L
ALP SERPL-CCNC: 92 U/L
ALP SERPL-CCNC: 96 U/L
ALT SERPL W/O P-5'-P-CCNC: 12 U/L
ALT SERPL W/O P-5'-P-CCNC: 12 U/L
ALT SERPL W/O P-5'-P-CCNC: 13 U/L
ALT SERPL W/O P-5'-P-CCNC: 15 U/L
ALT SERPL W/O P-5'-P-CCNC: 16 U/L
ALT SERPL W/O P-5'-P-CCNC: 19 U/L
ALT SERPL W/O P-5'-P-CCNC: 20 U/L
ALT SERPL W/O P-5'-P-CCNC: 21 U/L
ANION GAP SERPL CALC-SCNC: 10 MMOL/L
ANION GAP SERPL CALC-SCNC: 11 MMOL/L
ANION GAP SERPL CALC-SCNC: 12 MMOL/L
ANION GAP SERPL CALC-SCNC: 13 MMOL/L
ANION GAP SERPL CALC-SCNC: 14 MMOL/L
ANION GAP SERPL CALC-SCNC: 16 MMOL/L
ANION GAP SERPL CALC-SCNC: 17 MMOL/L
ANION GAP SERPL CALC-SCNC: 19 MMOL/L
ANION GAP SERPL CALC-SCNC: 5 MMOL/L
ANION GAP SERPL CALC-SCNC: 7 MMOL/L
ANION GAP SERPL CALC-SCNC: 8 MMOL/L
ANION GAP SERPL CALC-SCNC: 9 MMOL/L
ANION GAP SERPL CALC-SCNC: ABNORMAL MMOL/L
ANION GAP SERPL CALC-SCNC: ABNORMAL MMOL/L
ANISOCYTOSIS BLD QL SMEAR: ABNORMAL
AST SERPL-CCNC: 109 U/L
AST SERPL-CCNC: 15 U/L
AST SERPL-CCNC: 17 U/L
AST SERPL-CCNC: 19 U/L
AST SERPL-CCNC: 21 U/L
AST SERPL-CCNC: 25 U/L
AST SERPL-CCNC: 35 U/L
AST SERPL-CCNC: 38 U/L
AST SERPL-CCNC: 63 U/L
AST SERPL-CCNC: 82 U/L
BACTERIA BLD CULT: NORMAL
BACTERIA BLD CULT: NORMAL
BASO STIPL BLD QL SMEAR: ABNORMAL
BASOPHILS # BLD AUTO: 0.01 K/UL
BASOPHILS # BLD AUTO: 0.02 K/UL
BASOPHILS # BLD AUTO: 0.02 K/UL
BASOPHILS # BLD AUTO: 0.03 K/UL
BASOPHILS # BLD AUTO: 0.04 K/UL
BASOPHILS # BLD AUTO: 0.05 K/UL
BASOPHILS # BLD AUTO: 0.06 K/UL
BASOPHILS # BLD AUTO: 0.07 K/UL
BASOPHILS NFR BLD: 0.1 %
BASOPHILS NFR BLD: 0.2 %
BASOPHILS NFR BLD: 0.2 %
BASOPHILS NFR BLD: 0.4 %
BASOPHILS NFR BLD: 0.5 %
BASOPHILS NFR BLD: 0.5 %
BASOPHILS NFR BLD: 0.7 %
BASOPHILS NFR BLD: 0.8 %
BASOPHILS NFR BLD: 0.9 %
BILIRUB SERPL-MCNC: 0.3 MG/DL
BILIRUB SERPL-MCNC: 0.4 MG/DL
BILIRUB SERPL-MCNC: 0.5 MG/DL
BILIRUB SERPL-MCNC: 1 MG/DL
BILIRUB SERPL-MCNC: 1.2 MG/DL
BLD GP AB SCN CELLS X3 SERPL QL: NORMAL
BLD PROD TYP BPU: NORMAL
BLOOD UNIT EXPIRATION DATE: NORMAL
BLOOD UNIT TYPE CODE: 6200
BLOOD UNIT TYPE: NORMAL
BUN SERPL-MCNC: 12 MG/DL
BUN SERPL-MCNC: 14 MG/DL
BUN SERPL-MCNC: 14 MG/DL
BUN SERPL-MCNC: 15 MG/DL
BUN SERPL-MCNC: 18 MG/DL
BUN SERPL-MCNC: 19 MG/DL
BUN SERPL-MCNC: 20 MG/DL
BUN SERPL-MCNC: 22 MG/DL
BUN SERPL-MCNC: 23 MG/DL
BUN SERPL-MCNC: 24 MG/DL
BUN SERPL-MCNC: 24 MG/DL
BUN SERPL-MCNC: 25 MG/DL
BUN SERPL-MCNC: 26 MG/DL
BUN SERPL-MCNC: 27 MG/DL
BUN SERPL-MCNC: 28 MG/DL
BUN SERPL-MCNC: 29 MG/DL
BUN SERPL-MCNC: 3 MG/DL
BUN SERPL-MCNC: 4 MG/DL
BUN SERPL-MCNC: 5 MG/DL
BUN SERPL-MCNC: 7 MG/DL
BUN SERPL-MCNC: 8 MG/DL
BUN SERPL-MCNC: 8 MG/DL
BUN SERPL-MCNC: 84 MG/DL
BUN SERPL-MCNC: 9 MG/DL
BUN SERPL-MCNC: 91 MG/DL
CALCIUM SERPL-MCNC: 6.8 MG/DL
CALCIUM SERPL-MCNC: 7.6 MG/DL
CALCIUM SERPL-MCNC: 7.7 MG/DL
CALCIUM SERPL-MCNC: 7.8 MG/DL
CALCIUM SERPL-MCNC: 7.9 MG/DL
CALCIUM SERPL-MCNC: 8 MG/DL
CALCIUM SERPL-MCNC: 8.1 MG/DL
CALCIUM SERPL-MCNC: 8.2 MG/DL
CALCIUM SERPL-MCNC: 8.3 MG/DL
CALCIUM SERPL-MCNC: 8.5 MG/DL
CALCIUM SERPL-MCNC: 8.6 MG/DL
CALCIUM SERPL-MCNC: 8.8 MG/DL
CALCIUM SERPL-MCNC: 9.4 MG/DL
CHLORIDE SERPL-SCNC: 101 MMOL/L
CHLORIDE SERPL-SCNC: 102 MMOL/L
CHLORIDE SERPL-SCNC: 104 MMOL/L
CHLORIDE SERPL-SCNC: 105 MMOL/L
CHLORIDE SERPL-SCNC: 105 MMOL/L
CHLORIDE SERPL-SCNC: 106 MMOL/L
CHLORIDE SERPL-SCNC: 107 MMOL/L
CHLORIDE SERPL-SCNC: 108 MMOL/L
CHLORIDE SERPL-SCNC: 109 MMOL/L
CHLORIDE SERPL-SCNC: 110 MMOL/L
CHLORIDE SERPL-SCNC: 110 MMOL/L
CHLORIDE SERPL-SCNC: 114 MMOL/L
CK MB SERPL-MCNC: 8.8 NG/ML
CK MB SERPL-RTO: 9 %
CK SERPL-CCNC: 98 U/L
CK SERPL-CCNC: 98 U/L
CO2 SERPL-SCNC: 17 MMOL/L
CO2 SERPL-SCNC: 18 MMOL/L
CO2 SERPL-SCNC: 19 MMOL/L
CO2 SERPL-SCNC: 20 MMOL/L
CO2 SERPL-SCNC: 21 MMOL/L
CO2 SERPL-SCNC: 22 MMOL/L
CO2 SERPL-SCNC: 23 MMOL/L
CO2 SERPL-SCNC: 24 MMOL/L
CO2 SERPL-SCNC: 25 MMOL/L
CO2 SERPL-SCNC: 26 MMOL/L
CO2 SERPL-SCNC: 27 MMOL/L
CO2 SERPL-SCNC: 28 MMOL/L
CO2 SERPL-SCNC: ABNORMAL MMOL/L
CO2 SERPL-SCNC: ABNORMAL MMOL/L
CODING SYSTEM: NORMAL
CREAT SERPL-MCNC: 0.6 MG/DL
CREAT SERPL-MCNC: 0.7 MG/DL
CREAT SERPL-MCNC: 0.8 MG/DL
CREAT SERPL-MCNC: 1 MG/DL
CREAT SERPL-MCNC: 1.1 MG/DL
CREAT SERPL-MCNC: 1.5 MG/DL
CREAT SERPL-MCNC: 1.9 MG/DL
CREAT SERPL-MCNC: 2 MG/DL
CREAT SERPL-MCNC: 2.3 MG/DL
CREAT SERPL-MCNC: 2.3 MG/DL
CREAT SERPL-MCNC: 2.6 MG/DL
CREAT SERPL-MCNC: 2.8 MG/DL
CREAT SERPL-MCNC: 3.1 MG/DL
CREAT SERPL-MCNC: 3.2 MG/DL
CREAT SERPL-MCNC: 3.4 MG/DL
CREAT SERPL-MCNC: 3.6 MG/DL
CREAT SERPL-MCNC: 3.7 MG/DL
CREAT SERPL-MCNC: 3.9 MG/DL
CREAT SERPL-MCNC: 4.2 MG/DL
CREAT SERPL-MCNC: 4.2 MG/DL
CREAT SERPL-MCNC: 4.4 MG/DL
CREAT SERPL-MCNC: 9.1 MG/DL
CREAT SERPL-MCNC: 9.5 MG/DL
DELSYS: ABNORMAL
DIASTOLIC DYSFUNCTION: YES
DIFFERENTIAL METHOD: ABNORMAL
DISPENSE STATUS: NORMAL
EOSINOPHIL # BLD AUTO: 0 K/UL
EOSINOPHIL # BLD AUTO: 0 K/UL
EOSINOPHIL # BLD AUTO: 0.1 K/UL
EOSINOPHIL # BLD AUTO: 0.2 K/UL
EOSINOPHIL # BLD AUTO: 0.2 K/UL
EOSINOPHIL # BLD AUTO: 0.3 K/UL
EOSINOPHIL NFR BLD: 0.3 %
EOSINOPHIL NFR BLD: 0.4 %
EOSINOPHIL NFR BLD: 0.8 %
EOSINOPHIL NFR BLD: 0.9 %
EOSINOPHIL NFR BLD: 1 %
EOSINOPHIL NFR BLD: 1.2 %
EOSINOPHIL NFR BLD: 2.4 %
EOSINOPHIL NFR BLD: 2.5 %
EOSINOPHIL NFR BLD: 2.6 %
EOSINOPHIL NFR BLD: 4 %
EOSINOPHIL NFR BLD: 4.3 %
EOSINOPHIL NFR BLD: 4.8 %
ERYTHROCYTE [DISTWIDTH] IN BLOOD BY AUTOMATED COUNT: 15.2 %
ERYTHROCYTE [DISTWIDTH] IN BLOOD BY AUTOMATED COUNT: 15.2 %
ERYTHROCYTE [DISTWIDTH] IN BLOOD BY AUTOMATED COUNT: 15.6 %
ERYTHROCYTE [DISTWIDTH] IN BLOOD BY AUTOMATED COUNT: 15.7 %
ERYTHROCYTE [DISTWIDTH] IN BLOOD BY AUTOMATED COUNT: 15.8 %
ERYTHROCYTE [DISTWIDTH] IN BLOOD BY AUTOMATED COUNT: 16.1 %
ERYTHROCYTE [DISTWIDTH] IN BLOOD BY AUTOMATED COUNT: 16.2 %
ERYTHROCYTE [DISTWIDTH] IN BLOOD BY AUTOMATED COUNT: 16.4 %
ERYTHROCYTE [DISTWIDTH] IN BLOOD BY AUTOMATED COUNT: 16.6 %
ERYTHROCYTE [SEDIMENTATION RATE] IN BLOOD BY WESTERGREN METHOD: 39 MM/H
EST. GFR  (AFRICAN AMERICAN): 11.6 ML/MIN/1.73 M^2
EST. GFR  (AFRICAN AMERICAN): 12.3 ML/MIN/1.73 M^2
EST. GFR  (AFRICAN AMERICAN): 12.3 ML/MIN/1.73 M^2
EST. GFR  (AFRICAN AMERICAN): 13.5 ML/MIN/1.73 M^2
EST. GFR  (AFRICAN AMERICAN): 14.3 ML/MIN/1.73 M^2
EST. GFR  (AFRICAN AMERICAN): 14.8 ML/MIN/1.73 M^2
EST. GFR  (AFRICAN AMERICAN): 15.9 ML/MIN/1.73 M^2
EST. GFR  (AFRICAN AMERICAN): 17.1 ML/MIN/1.73 M^2
EST. GFR  (AFRICAN AMERICAN): 17.8 ML/MIN/1.73 M^2
EST. GFR  (AFRICAN AMERICAN): 20.1 ML/MIN/1.73 M^2
EST. GFR  (AFRICAN AMERICAN): 22 ML/MIN/1.73 M^2
EST. GFR  (AFRICAN AMERICAN): 25.5 ML/MIN/1.73 M^2
EST. GFR  (AFRICAN AMERICAN): 25.5 ML/MIN/1.73 M^2
EST. GFR  (AFRICAN AMERICAN): 30.2 ML/MIN/1.73 M^2
EST. GFR  (AFRICAN AMERICAN): 32.1 ML/MIN/1.73 M^2
EST. GFR  (AFRICAN AMERICAN): 4.6 ML/MIN/1.73 M^2
EST. GFR  (AFRICAN AMERICAN): 4.8 ML/MIN/1.73 M^2
EST. GFR  (AFRICAN AMERICAN): 42.7 ML/MIN/1.73 M^2
EST. GFR  (AFRICAN AMERICAN): >60 ML/MIN/1.73 M^2
EST. GFR  (NON AFRICAN AMERICAN): 10.1 ML/MIN/1.73 M^2
EST. GFR  (NON AFRICAN AMERICAN): 10.7 ML/MIN/1.73 M^2
EST. GFR  (NON AFRICAN AMERICAN): 10.7 ML/MIN/1.73 M^2
EST. GFR  (NON AFRICAN AMERICAN): 11.7 ML/MIN/1.73 M^2
EST. GFR  (NON AFRICAN AMERICAN): 12.4 ML/MIN/1.73 M^2
EST. GFR  (NON AFRICAN AMERICAN): 12.9 ML/MIN/1.73 M^2
EST. GFR  (NON AFRICAN AMERICAN): 13.8 ML/MIN/1.73 M^2
EST. GFR  (NON AFRICAN AMERICAN): 14.8 ML/MIN/1.73 M^2
EST. GFR  (NON AFRICAN AMERICAN): 15.4 ML/MIN/1.73 M^2
EST. GFR  (NON AFRICAN AMERICAN): 17.4 ML/MIN/1.73 M^2
EST. GFR  (NON AFRICAN AMERICAN): 19.1 ML/MIN/1.73 M^2
EST. GFR  (NON AFRICAN AMERICAN): 22.1 ML/MIN/1.73 M^2
EST. GFR  (NON AFRICAN AMERICAN): 22.1 ML/MIN/1.73 M^2
EST. GFR  (NON AFRICAN AMERICAN): 26.2 ML/MIN/1.73 M^2
EST. GFR  (NON AFRICAN AMERICAN): 27.9 ML/MIN/1.73 M^2
EST. GFR  (NON AFRICAN AMERICAN): 37.1 ML/MIN/1.73 M^2
EST. GFR  (NON AFRICAN AMERICAN): 4 ML/MIN/1.73 M^2
EST. GFR  (NON AFRICAN AMERICAN): 4.2 ML/MIN/1.73 M^2
EST. GFR  (NON AFRICAN AMERICAN): 53.9 ML/MIN/1.73 M^2
EST. GFR  (NON AFRICAN AMERICAN): >60 ML/MIN/1.73 M^2
ESTIMATED PA SYSTOLIC PRESSURE: 53.44
FLOW: 3
FLOW: 3
FLOW: 8
GLUCOSE SERPL-MCNC: 100 MG/DL
GLUCOSE SERPL-MCNC: 101 MG/DL
GLUCOSE SERPL-MCNC: 102 MG/DL
GLUCOSE SERPL-MCNC: 103 MG/DL
GLUCOSE SERPL-MCNC: 104 MG/DL
GLUCOSE SERPL-MCNC: 105 MG/DL
GLUCOSE SERPL-MCNC: 106 MG/DL
GLUCOSE SERPL-MCNC: 108 MG/DL
GLUCOSE SERPL-MCNC: 112 MG/DL
GLUCOSE SERPL-MCNC: 113 MG/DL
GLUCOSE SERPL-MCNC: 116 MG/DL
GLUCOSE SERPL-MCNC: 124 MG/DL
GLUCOSE SERPL-MCNC: 127 MG/DL
GLUCOSE SERPL-MCNC: 130 MG/DL
GLUCOSE SERPL-MCNC: 130 MG/DL
GLUCOSE SERPL-MCNC: 131 MG/DL
GLUCOSE SERPL-MCNC: 132 MG/DL
GLUCOSE SERPL-MCNC: 140 MG/DL
GLUCOSE SERPL-MCNC: 164 MG/DL
GLUCOSE SERPL-MCNC: 83 MG/DL
GLUCOSE SERPL-MCNC: 88 MG/DL
GLUCOSE SERPL-MCNC: 90 MG/DL
GLUCOSE SERPL-MCNC: 97 MG/DL
GLUCOSE SERPL-MCNC: 98 MG/DL
GLUCOSE SERPL-MCNC: 98 MG/DL
HCO3 UR-SCNC: 18.2 MMOL/L (ref 24–28)
HCO3 UR-SCNC: 25.4 MMOL/L (ref 24–28)
HCO3 UR-SCNC: 27.4 MMOL/L (ref 24–28)
HCO3 UR-SCNC: 29.9 MMOL/L (ref 24–28)
HCT VFR BLD AUTO: 23.3 %
HCT VFR BLD AUTO: 24.2 %
HCT VFR BLD AUTO: 25.1 %
HCT VFR BLD AUTO: 26.9 %
HCT VFR BLD AUTO: 27.9 %
HCT VFR BLD AUTO: 28.1 %
HCT VFR BLD AUTO: 29.7 %
HCT VFR BLD AUTO: 30.3 %
HCT VFR BLD AUTO: 30.3 %
HCT VFR BLD AUTO: 31.8 %
HCT VFR BLD AUTO: 31.9 %
HCT VFR BLD AUTO: 31.9 %
HCT VFR BLD AUTO: 33.9 %
HCT VFR BLD AUTO: 34.8 %
HGB BLD-MCNC: 10 G/DL
HGB BLD-MCNC: 10.2 G/DL
HGB BLD-MCNC: 10.3 G/DL
HGB BLD-MCNC: 7.4 G/DL
HGB BLD-MCNC: 7.8 G/DL
HGB BLD-MCNC: 8.1 G/DL
HGB BLD-MCNC: 8.4 G/DL
HGB BLD-MCNC: 8.6 G/DL
HGB BLD-MCNC: 8.9 G/DL
HGB BLD-MCNC: 9.2 G/DL
HGB BLD-MCNC: 9.7 G/DL
HGB BLD-MCNC: 9.8 G/DL
IMM GRANULOCYTES # BLD AUTO: 0.03 K/UL
IMM GRANULOCYTES # BLD AUTO: 0.04 K/UL
IMM GRANULOCYTES # BLD AUTO: 0.05 K/UL
IMM GRANULOCYTES # BLD AUTO: 0.05 K/UL
IMM GRANULOCYTES # BLD AUTO: 0.06 K/UL
IMM GRANULOCYTES # BLD AUTO: 0.06 K/UL
IMM GRANULOCYTES # BLD AUTO: 0.07 K/UL
IMM GRANULOCYTES # BLD AUTO: 0.08 K/UL
IMM GRANULOCYTES # BLD AUTO: 0.08 K/UL
IMM GRANULOCYTES # BLD AUTO: 0.09 K/UL
IMM GRANULOCYTES NFR BLD AUTO: 0.4 %
IMM GRANULOCYTES NFR BLD AUTO: 0.5 %
IMM GRANULOCYTES NFR BLD AUTO: 0.6 %
IMM GRANULOCYTES NFR BLD AUTO: 0.7 %
IMM GRANULOCYTES NFR BLD AUTO: 0.8 %
IMM GRANULOCYTES NFR BLD AUTO: 1 %
INR PPP: 1.2
INR PPP: 1.2
INR PPP: 1.3
INR PPP: 1.4
INR PPP: 1.6
INR PPP: 1.6
INR PPP: 1.8
LACTATE SERPL-SCNC: 0.7 MMOL/L
LYMPHOCYTES # BLD AUTO: 0.6 K/UL
LYMPHOCYTES # BLD AUTO: 0.7 K/UL
LYMPHOCYTES # BLD AUTO: 0.9 K/UL
LYMPHOCYTES # BLD AUTO: 1 K/UL
LYMPHOCYTES # BLD AUTO: 1 K/UL
LYMPHOCYTES # BLD AUTO: 1.2 K/UL
LYMPHOCYTES # BLD AUTO: 1.3 K/UL
LYMPHOCYTES # BLD AUTO: 1.4 K/UL
LYMPHOCYTES # BLD AUTO: 1.4 K/UL
LYMPHOCYTES NFR BLD: 10.1 %
LYMPHOCYTES NFR BLD: 10.3 %
LYMPHOCYTES NFR BLD: 11.4 %
LYMPHOCYTES NFR BLD: 12 %
LYMPHOCYTES NFR BLD: 13 %
LYMPHOCYTES NFR BLD: 15.8 %
LYMPHOCYTES NFR BLD: 16.6 %
LYMPHOCYTES NFR BLD: 18.2 %
LYMPHOCYTES NFR BLD: 19.9 %
LYMPHOCYTES NFR BLD: 6.6 %
LYMPHOCYTES NFR BLD: 6.6 %
LYMPHOCYTES NFR BLD: 7.1 %
LYMPHOCYTES NFR BLD: 7.7 %
LYMPHOCYTES NFR BLD: 8 %
MAGNESIUM SERPL-MCNC: 1.6 MG/DL
MAGNESIUM SERPL-MCNC: 1.7 MG/DL
MAGNESIUM SERPL-MCNC: 1.7 MG/DL
MAGNESIUM SERPL-MCNC: 1.8 MG/DL
MAGNESIUM SERPL-MCNC: 1.8 MG/DL
MAGNESIUM SERPL-MCNC: 1.9 MG/DL
MAGNESIUM SERPL-MCNC: 2 MG/DL
MAGNESIUM SERPL-MCNC: 2.1 MG/DL
MAGNESIUM SERPL-MCNC: 2.2 MG/DL
MAGNESIUM SERPL-MCNC: 2.3 MG/DL
MAGNESIUM SERPL-MCNC: 2.4 MG/DL
MAGNESIUM SERPL-MCNC: 2.5 MG/DL
MCH RBC QN AUTO: 29.5 PG
MCH RBC QN AUTO: 29.7 PG
MCH RBC QN AUTO: 29.7 PG
MCH RBC QN AUTO: 29.8 PG
MCH RBC QN AUTO: 29.9 PG
MCH RBC QN AUTO: 30.1 PG
MCH RBC QN AUTO: 30.2 PG
MCH RBC QN AUTO: 30.3 PG
MCH RBC QN AUTO: 30.3 PG
MCH RBC QN AUTO: 30.5 PG
MCHC RBC AUTO-ENTMCNC: 29.6 G/DL
MCHC RBC AUTO-ENTMCNC: 30.1 G/DL
MCHC RBC AUTO-ENTMCNC: 30.4 G/DL
MCHC RBC AUTO-ENTMCNC: 30.6 G/DL
MCHC RBC AUTO-ENTMCNC: 30.8 G/DL
MCHC RBC AUTO-ENTMCNC: 31 G/DL
MCHC RBC AUTO-ENTMCNC: 31.2 G/DL
MCHC RBC AUTO-ENTMCNC: 31.3 G/DL
MCHC RBC AUTO-ENTMCNC: 31.8 G/DL
MCHC RBC AUTO-ENTMCNC: 31.9 G/DL
MCHC RBC AUTO-ENTMCNC: 32.2 G/DL
MCHC RBC AUTO-ENTMCNC: 32.3 G/DL
MCHC RBC AUTO-ENTMCNC: 33 G/DL
MCHC RBC AUTO-ENTMCNC: 33 G/DL
MCV RBC AUTO: 100 FL
MCV RBC AUTO: 100 FL
MCV RBC AUTO: 91 FL
MCV RBC AUTO: 92 FL
MCV RBC AUTO: 93 FL
MCV RBC AUTO: 95 FL
MCV RBC AUTO: 95 FL
MCV RBC AUTO: 96 FL
MCV RBC AUTO: 97 FL
MCV RBC AUTO: 97 FL
MCV RBC AUTO: 99 FL
MCV RBC AUTO: 99 FL
MITRAL VALVE REGURGITATION: ABNORMAL
MODE: ABNORMAL
MONOCYTES # BLD AUTO: 0.4 K/UL
MONOCYTES # BLD AUTO: 0.5 K/UL
MONOCYTES # BLD AUTO: 0.6 K/UL
MONOCYTES # BLD AUTO: 0.7 K/UL
MONOCYTES # BLD AUTO: 0.8 K/UL
MONOCYTES # BLD AUTO: 0.9 K/UL
MONOCYTES # BLD AUTO: 0.9 K/UL
MONOCYTES NFR BLD: 10.3 %
MONOCYTES NFR BLD: 10.5 %
MONOCYTES NFR BLD: 10.7 %
MONOCYTES NFR BLD: 3.6 %
MONOCYTES NFR BLD: 4.1 %
MONOCYTES NFR BLD: 4.1 %
MONOCYTES NFR BLD: 5.9 %
MONOCYTES NFR BLD: 5.9 %
MONOCYTES NFR BLD: 7.3 %
MONOCYTES NFR BLD: 7.6 %
MONOCYTES NFR BLD: 8.4 %
MONOCYTES NFR BLD: 8.8 %
MONOCYTES NFR BLD: 9.1 %
MONOCYTES NFR BLD: 9.4 %
NEUTROPHILS # BLD AUTO: 3.9 K/UL
NEUTROPHILS # BLD AUTO: 4.6 K/UL
NEUTROPHILS # BLD AUTO: 4.6 K/UL
NEUTROPHILS # BLD AUTO: 5.1 K/UL
NEUTROPHILS # BLD AUTO: 6.1 K/UL
NEUTROPHILS # BLD AUTO: 6.2 K/UL
NEUTROPHILS # BLD AUTO: 6.7 K/UL
NEUTROPHILS # BLD AUTO: 6.9 K/UL
NEUTROPHILS # BLD AUTO: 7.3 K/UL
NEUTROPHILS # BLD AUTO: 7.8 K/UL
NEUTROPHILS # BLD AUTO: 8.5 K/UL
NEUTROPHILS # BLD AUTO: 9.8 K/UL
NEUTROPHILS NFR BLD: 65.2 %
NEUTROPHILS NFR BLD: 66.2 %
NEUTROPHILS NFR BLD: 69.6 %
NEUTROPHILS NFR BLD: 71 %
NEUTROPHILS NFR BLD: 73.5 %
NEUTROPHILS NFR BLD: 75.2 %
NEUTROPHILS NFR BLD: 79.1 %
NEUTROPHILS NFR BLD: 80.8 %
NEUTROPHILS NFR BLD: 81.8 %
NEUTROPHILS NFR BLD: 82.9 %
NEUTROPHILS NFR BLD: 83.1 %
NEUTROPHILS NFR BLD: 86.4 %
NEUTROPHILS NFR BLD: 87.3 %
NEUTROPHILS NFR BLD: 87.3 %
NRBC BLD-RTO: 0 /100 WBC
PCO2 BLDA: 44.1 MMHG (ref 35–45)
PCO2 BLDA: 57.6 MMHG (ref 35–45)
PCO2 BLDA: 60.4 MMHG (ref 35–45)
PCO2 BLDA: 67 MMHG (ref 35–45)
PH SMN: 7.11 [PH] (ref 7.35–7.45)
PH SMN: 7.19 [PH] (ref 7.35–7.45)
PH SMN: 7.26 [PH] (ref 7.35–7.45)
PH SMN: 7.44 [PH] (ref 7.35–7.45)
PHOSPHATE SERPL-MCNC: 1.1 MG/DL
PHOSPHATE SERPL-MCNC: 1.1 MG/DL
PHOSPHATE SERPL-MCNC: 1.2 MG/DL
PHOSPHATE SERPL-MCNC: 1.7 MG/DL
PHOSPHATE SERPL-MCNC: 1.7 MG/DL
PHOSPHATE SERPL-MCNC: 1.8 MG/DL
PHOSPHATE SERPL-MCNC: 1.9 MG/DL
PHOSPHATE SERPL-MCNC: 2.1 MG/DL
PHOSPHATE SERPL-MCNC: 2.7 MG/DL
PHOSPHATE SERPL-MCNC: 2.7 MG/DL
PHOSPHATE SERPL-MCNC: 3.6 MG/DL
PHOSPHATE SERPL-MCNC: 3.9 MG/DL
PHOSPHATE SERPL-MCNC: 4.8 MG/DL
PHOSPHATE SERPL-MCNC: 5 MG/DL
PHOSPHATE SERPL-MCNC: 5.4 MG/DL
PHOSPHATE SERPL-MCNC: 5.5 MG/DL
PHOSPHATE SERPL-MCNC: 5.7 MG/DL
PHOSPHATE SERPL-MCNC: 5.8 MG/DL
PHOSPHATE SERPL-MCNC: 5.9 MG/DL
PHOSPHATE SERPL-MCNC: 6 MG/DL
PHOSPHATE SERPL-MCNC: 6.1 MG/DL
PHOSPHATE SERPL-MCNC: 6.1 MG/DL
PHOSPHATE SERPL-MCNC: 6.4 MG/DL
PLATELET # BLD AUTO: 103 K/UL
PLATELET # BLD AUTO: 104 K/UL
PLATELET # BLD AUTO: 106 K/UL
PLATELET # BLD AUTO: 119 K/UL
PLATELET # BLD AUTO: 121 K/UL
PLATELET # BLD AUTO: 124 K/UL
PLATELET # BLD AUTO: 80 K/UL
PLATELET # BLD AUTO: 85 K/UL
PLATELET # BLD AUTO: 89 K/UL
PLATELET # BLD AUTO: 89 K/UL
PLATELET # BLD AUTO: 90 K/UL
PLATELET # BLD AUTO: 91 K/UL
PLATELET # BLD AUTO: 98 K/UL
PLATELET # BLD AUTO: 99 K/UL
PLATELET BLD QL SMEAR: ABNORMAL
PMV BLD AUTO: 11.3 FL
PMV BLD AUTO: 11.4 FL
PMV BLD AUTO: 11.6 FL
PMV BLD AUTO: 11.7 FL
PMV BLD AUTO: 11.8 FL
PMV BLD AUTO: 11.8 FL
PMV BLD AUTO: 11.9 FL
PMV BLD AUTO: 12.2 FL
PMV BLD AUTO: 12.3 FL
PO2 BLDA: 24 MMHG (ref 40–60)
PO2 BLDA: 36 MMHG (ref 40–60)
PO2 BLDA: 42 MMHG (ref 40–60)
PO2 BLDA: 55 MMHG (ref 40–60)
POC BE: -11 MMOL/L
POC BE: -3 MMOL/L
POC BE: 0 MMOL/L
POC BE: 6 MMOL/L
POC SATURATED O2: 45 % (ref 95–100)
POC SATURATED O2: 54 % (ref 95–100)
POC SATURATED O2: 69 % (ref 95–100)
POC SATURATED O2: 76 % (ref 95–100)
POC TCO2: 20 MMOL/L (ref 24–29)
POC TCO2: 27 MMOL/L (ref 24–29)
POC TCO2: 29 MMOL/L (ref 24–29)
POC TCO2: 31 MMOL/L (ref 24–29)
POLYCHROMASIA BLD QL SMEAR: ABNORMAL
POTASSIUM SERPL-SCNC: 3.7 MMOL/L
POTASSIUM SERPL-SCNC: 3.7 MMOL/L
POTASSIUM SERPL-SCNC: 4.1 MMOL/L
POTASSIUM SERPL-SCNC: 4.2 MMOL/L
POTASSIUM SERPL-SCNC: 4.3 MMOL/L
POTASSIUM SERPL-SCNC: 4.4 MMOL/L
POTASSIUM SERPL-SCNC: 4.5 MMOL/L
POTASSIUM SERPL-SCNC: 4.6 MMOL/L
POTASSIUM SERPL-SCNC: 4.7 MMOL/L
POTASSIUM SERPL-SCNC: 5 MMOL/L
PROT SERPL-MCNC: 5.7 G/DL
PROT SERPL-MCNC: 6 G/DL
PROT SERPL-MCNC: 6 G/DL
PROT SERPL-MCNC: 6.2 G/DL
PROT SERPL-MCNC: 6.2 G/DL
PROT SERPL-MCNC: 6.4 G/DL
PROT SERPL-MCNC: 6.4 G/DL
PROT SERPL-MCNC: 6.6 G/DL
PROT SERPL-MCNC: 7.2 G/DL
PROT SERPL-MCNC: 7.3 G/DL
PROTHROMBIN TIME: 12.1 SEC
PROTHROMBIN TIME: 12.4 SEC
PROTHROMBIN TIME: 13.4 SEC
PROTHROMBIN TIME: 13.8 SEC
PROTHROMBIN TIME: 13.9 SEC
PROTHROMBIN TIME: 14 SEC
PROTHROMBIN TIME: 14.1 SEC
PROTHROMBIN TIME: 15.6 SEC
PROTHROMBIN TIME: 15.7 SEC
PROTHROMBIN TIME: 17.5 SEC
RBC # BLD AUTO: 2.51 M/UL
RBC # BLD AUTO: 2.63 M/UL
RBC # BLD AUTO: 2.75 M/UL
RBC # BLD AUTO: 2.79 M/UL
RBC # BLD AUTO: 2.82 M/UL
RBC # BLD AUTO: 2.94 M/UL
RBC # BLD AUTO: 3.05 M/UL
RBC # BLD AUTO: 3.23 M/UL
RBC # BLD AUTO: 3.28 M/UL
RBC # BLD AUTO: 3.28 M/UL
RBC # BLD AUTO: 3.29 M/UL
RBC # BLD AUTO: 3.35 M/UL
RBC # BLD AUTO: 3.42 M/UL
RBC # BLD AUTO: 3.47 M/UL
RETIRED EF AND QEF - SEE NOTES: 30 (ref 55–65)
RETIRED EF AND QEF - SEE NOTES: 40 (ref 55–65)
SAMPLE: ABNORMAL
SITE: ABNORMAL
SODIUM SERPL-SCNC: 138 MMOL/L
SODIUM SERPL-SCNC: 138 MMOL/L
SODIUM SERPL-SCNC: 139 MMOL/L
SODIUM SERPL-SCNC: 140 MMOL/L
SODIUM SERPL-SCNC: 141 MMOL/L
SODIUM SERPL-SCNC: 142 MMOL/L
SP02: 91
SP02: 99
T4 FREE SERPL-MCNC: 0.93 NG/DL
TRANS ERYTHROCYTES VOL PATIENT: NORMAL ML
TRICUSPID VALVE REGURGITATION: ABNORMAL
TROPONIN I SERPL DL<=0.01 NG/ML-MCNC: 10.98 NG/ML
TROPONIN I SERPL DL<=0.01 NG/ML-MCNC: 17.07 NG/ML
TROPONIN I SERPL DL<=0.01 NG/ML-MCNC: 23.49 NG/ML
TROPONIN I SERPL DL<=0.01 NG/ML-MCNC: 25.41 NG/ML
TROPONIN I SERPL DL<=0.01 NG/ML-MCNC: 3.67 NG/ML
TROPONIN I SERPL DL<=0.01 NG/ML-MCNC: 38.46 NG/ML
TROPONIN I SERPL DL<=0.01 NG/ML-MCNC: 38.7 NG/ML
TSH SERPL DL<=0.005 MIU/L-ACNC: 8.42 UIU/ML
WBC # BLD AUTO: 10.45 K/UL
WBC # BLD AUTO: 11.22 K/UL
WBC # BLD AUTO: 11.22 K/UL
WBC # BLD AUTO: 11.37 K/UL
WBC # BLD AUTO: 5.48 K/UL
WBC # BLD AUTO: 6.93 K/UL
WBC # BLD AUTO: 6.96 K/UL
WBC # BLD AUTO: 7.04 K/UL
WBC # BLD AUTO: 8.23 K/UL
WBC # BLD AUTO: 8.46 K/UL
WBC # BLD AUTO: 8.51 K/UL
WBC # BLD AUTO: 8.69 K/UL
WBC # BLD AUTO: 8.78 K/UL
WBC # BLD AUTO: 9.38 K/UL

## 2018-01-01 PROCEDURE — 93005 ELECTROCARDIOGRAM TRACING: CPT

## 2018-01-01 PROCEDURE — 90945 DIALYSIS ONE EVALUATION: CPT

## 2018-01-01 PROCEDURE — 63600175 PHARM REV CODE 636 W HCPCS: Performed by: INTERNAL MEDICINE

## 2018-01-01 PROCEDURE — 80069 RENAL FUNCTION PANEL: CPT

## 2018-01-01 PROCEDURE — 20000000 HC ICU ROOM

## 2018-01-01 PROCEDURE — 80069 RENAL FUNCTION PANEL: CPT | Mod: 91

## 2018-01-01 PROCEDURE — 83735 ASSAY OF MAGNESIUM: CPT

## 2018-01-01 PROCEDURE — 93306 TTE W/DOPPLER COMPLETE: CPT | Mod: 26,,, | Performed by: INTERNAL MEDICINE

## 2018-01-01 PROCEDURE — 86901 BLOOD TYPING SEROLOGIC RH(D): CPT

## 2018-01-01 PROCEDURE — 25000003 PHARM REV CODE 250: Performed by: INTERNAL MEDICINE

## 2018-01-01 PROCEDURE — 27000221 HC OXYGEN, UP TO 24 HOURS

## 2018-01-01 PROCEDURE — 25000003 PHARM REV CODE 250

## 2018-01-01 PROCEDURE — 99223 1ST HOSP IP/OBS HIGH 75: CPT | Mod: GC,,, | Performed by: INTERNAL MEDICINE

## 2018-01-01 PROCEDURE — 80048 BASIC METABOLIC PNL TOTAL CA: CPT

## 2018-01-01 PROCEDURE — 25000003 PHARM REV CODE 250: Performed by: STUDENT IN AN ORGANIZED HEALTH CARE EDUCATION/TRAINING PROGRAM

## 2018-01-01 PROCEDURE — 63600175 PHARM REV CODE 636 W HCPCS: Performed by: STUDENT IN AN ORGANIZED HEALTH CARE EDUCATION/TRAINING PROGRAM

## 2018-01-01 PROCEDURE — 97110 THERAPEUTIC EXERCISES: CPT

## 2018-01-01 PROCEDURE — A4216 STERILE WATER/SALINE, 10 ML: HCPCS | Performed by: STUDENT IN AN ORGANIZED HEALTH CARE EDUCATION/TRAINING PROGRAM

## 2018-01-01 PROCEDURE — 94761 N-INVAS EAR/PLS OXIMETRY MLT: CPT

## 2018-01-01 PROCEDURE — 84484 ASSAY OF TROPONIN QUANT: CPT

## 2018-01-01 PROCEDURE — 85025 COMPLETE CBC W/AUTO DIFF WBC: CPT

## 2018-01-01 PROCEDURE — P9021 RED BLOOD CELLS UNIT: HCPCS

## 2018-01-01 PROCEDURE — 97165 OT EVAL LOW COMPLEX 30 MIN: CPT

## 2018-01-01 PROCEDURE — 99232 SBSQ HOSP IP/OBS MODERATE 35: CPT | Mod: ,,, | Performed by: INTERNAL MEDICINE

## 2018-01-01 PROCEDURE — 97535 SELF CARE MNGMENT TRAINING: CPT

## 2018-01-01 PROCEDURE — 90945 DIALYSIS ONE EVALUATION: CPT | Mod: ,,, | Performed by: INTERNAL MEDICINE

## 2018-01-01 PROCEDURE — 85610 PROTHROMBIN TIME: CPT

## 2018-01-01 PROCEDURE — 93010 ELECTROCARDIOGRAM REPORT: CPT | Mod: ,,, | Performed by: INTERNAL MEDICINE

## 2018-01-01 PROCEDURE — 99285 EMERGENCY DEPT VISIT HI MDM: CPT | Mod: 25

## 2018-01-01 PROCEDURE — 84484 ASSAY OF TROPONIN QUANT: CPT | Mod: 91

## 2018-01-01 PROCEDURE — 93307 TTE W/O DOPPLER COMPLETE: CPT

## 2018-01-01 PROCEDURE — 83735 ASSAY OF MAGNESIUM: CPT | Mod: 91

## 2018-01-01 PROCEDURE — 93010 ELECTROCARDIOGRAM REPORT: CPT | Mod: 76,,, | Performed by: INTERNAL MEDICINE

## 2018-01-01 PROCEDURE — 27100171 HC OXYGEN HIGH FLOW UP TO 24 HOURS

## 2018-01-01 PROCEDURE — 86920 COMPATIBILITY TEST SPIN: CPT

## 2018-01-01 PROCEDURE — 84443 ASSAY THYROID STIM HORMONE: CPT

## 2018-01-01 PROCEDURE — 86644 CMV ANTIBODY: CPT

## 2018-01-01 PROCEDURE — 99233 SBSQ HOSP IP/OBS HIGH 50: CPT | Mod: GC,,, | Performed by: INTERNAL MEDICINE

## 2018-01-01 PROCEDURE — 80053 COMPREHEN METABOLIC PANEL: CPT

## 2018-01-01 PROCEDURE — 97530 THERAPEUTIC ACTIVITIES: CPT

## 2018-01-01 PROCEDURE — 84439 ASSAY OF FREE THYROXINE: CPT

## 2018-01-01 PROCEDURE — 87040 BLOOD CULTURE FOR BACTERIA: CPT | Mod: 59

## 2018-01-01 PROCEDURE — 80100008 HC CRRT DAILY MAINTENANCE

## 2018-01-01 PROCEDURE — 82553 CREATINE MB FRACTION: CPT

## 2018-01-01 PROCEDURE — 90945 DIALYSIS ONE EVALUATION: CPT | Mod: GC,,, | Performed by: INTERNAL MEDICINE

## 2018-01-01 PROCEDURE — 97116 GAIT TRAINING THERAPY: CPT

## 2018-01-01 PROCEDURE — 93306 TTE W/DOPPLER COMPLETE: CPT

## 2018-01-01 PROCEDURE — 85025 COMPLETE CBC W/AUTO DIFF WBC: CPT | Mod: 91

## 2018-01-01 PROCEDURE — 80100014 HC HEMODIALYSIS 1:1

## 2018-01-01 PROCEDURE — 83605 ASSAY OF LACTIC ACID: CPT

## 2018-01-01 PROCEDURE — 82803 BLOOD GASES ANY COMBINATION: CPT

## 2018-01-01 PROCEDURE — 63600175 PHARM REV CODE 636 W HCPCS

## 2018-01-01 PROCEDURE — 36556 INSERT NON-TUNNEL CV CATH: CPT

## 2018-01-01 PROCEDURE — 97161 PT EVAL LOW COMPLEX 20 MIN: CPT

## 2018-01-01 PROCEDURE — 02H633Z INSERTION OF INFUSION DEVICE INTO RIGHT ATRIUM, PERCUTANEOUS APPROACH: ICD-10-PCS | Performed by: INTERNAL MEDICINE

## 2018-01-01 PROCEDURE — 84100 ASSAY OF PHOSPHORUS: CPT

## 2018-01-01 PROCEDURE — 99900035 HC TECH TIME PER 15 MIN (STAT)

## 2018-01-01 PROCEDURE — 99223 1ST HOSP IP/OBS HIGH 75: CPT | Mod: AI,GC,, | Performed by: INTERNAL MEDICINE

## 2018-01-01 PROCEDURE — 27201040 HC RC 50 FILTER: Mod: 91

## 2018-01-01 PROCEDURE — 27100092 HC HIGH FLOW DELIVERY CANNULA

## 2018-01-01 PROCEDURE — 25000242 PHARM REV CODE 250 ALT 637 W/ HCPCS: Performed by: STUDENT IN AN ORGANIZED HEALTH CARE EDUCATION/TRAINING PROGRAM

## 2018-01-01 PROCEDURE — 99239 HOSP IP/OBS DSCHRG MGMT >30: CPT | Mod: GC,,, | Performed by: INTERNAL MEDICINE

## 2018-01-01 RX ORDER — OXYCODONE HYDROCHLORIDE 5 MG/1
15 TABLET ORAL EVERY 6 HOURS PRN
Status: DISCONTINUED | OUTPATIENT
Start: 2018-01-01 | End: 2018-01-01 | Stop reason: HOSPADM

## 2018-01-01 RX ORDER — CYCLOBENZAPRINE HCL 5 MG
5 TABLET ORAL 3 TIMES DAILY PRN
Status: DISCONTINUED | OUTPATIENT
Start: 2018-01-01 | End: 2018-01-01 | Stop reason: HOSPADM

## 2018-01-01 RX ORDER — OXYCODONE HYDROCHLORIDE 5 MG/1
30 TABLET ORAL EVERY 6 HOURS PRN
Status: DISCONTINUED | OUTPATIENT
Start: 2018-01-01 | End: 2018-01-01

## 2018-01-01 RX ORDER — ALPRAZOLAM 1 MG/1
1 TABLET ORAL 3 TIMES DAILY PRN
Status: DISCONTINUED | OUTPATIENT
Start: 2018-01-01 | End: 2018-01-01 | Stop reason: HOSPADM

## 2018-01-01 RX ORDER — POLYETHYLENE GLYCOL 3350 17 G/17G
17 POWDER, FOR SOLUTION ORAL DAILY
Status: DISCONTINUED | OUTPATIENT
Start: 2018-01-01 | End: 2018-01-01

## 2018-01-01 RX ORDER — ONDANSETRON HYDROCHLORIDE 4 MG/5ML
4 SOLUTION ORAL EVERY 6 HOURS PRN
Status: DISCONTINUED | OUTPATIENT
Start: 2018-01-01 | End: 2018-01-01

## 2018-01-01 RX ORDER — OXYCODONE HYDROCHLORIDE 30 MG/1
30 TABLET ORAL EVERY 8 HOURS PRN
COMMUNITY

## 2018-01-01 RX ORDER — ATORVASTATIN CALCIUM 20 MG/1
80 TABLET, FILM COATED ORAL DAILY
Status: DISCONTINUED | OUTPATIENT
Start: 2018-01-01 | End: 2018-01-01 | Stop reason: HOSPADM

## 2018-01-01 RX ORDER — LISINOPRIL 2.5 MG/1
2.5 TABLET ORAL DAILY
Status: DISCONTINUED | OUTPATIENT
Start: 2018-01-01 | End: 2018-01-01

## 2018-01-01 RX ORDER — MAGNESIUM SULFATE HEPTAHYDRATE 40 MG/ML
2 INJECTION, SOLUTION INTRAVENOUS
Status: ACTIVE | OUTPATIENT
Start: 2018-01-01 | End: 2018-01-01

## 2018-01-01 RX ORDER — MAGNESIUM SULFATE HEPTAHYDRATE 40 MG/ML
2 INJECTION, SOLUTION INTRAVENOUS ONCE
Status: COMPLETED | OUTPATIENT
Start: 2018-01-01 | End: 2018-01-01

## 2018-01-01 RX ORDER — HYDROCODONE BITARTRATE AND ACETAMINOPHEN 500; 5 MG/1; MG/1
TABLET ORAL CONTINUOUS
Status: ACTIVE | OUTPATIENT
Start: 2018-01-01 | End: 2018-01-01

## 2018-01-01 RX ORDER — SODIUM CHLORIDE 9 MG/ML
INJECTION, SOLUTION INTRAVENOUS
Status: DISCONTINUED | OUTPATIENT
Start: 2018-01-01 | End: 2018-01-01 | Stop reason: HOSPADM

## 2018-01-01 RX ORDER — ALBUTEROL SULFATE 90 UG/1
2 AEROSOL, METERED RESPIRATORY (INHALATION) EVERY 6 HOURS PRN
Status: DISCONTINUED | OUTPATIENT
Start: 2018-01-01 | End: 2018-01-01 | Stop reason: HOSPADM

## 2018-01-01 RX ORDER — PRAMIPEXOLE DIHYDROCHLORIDE 1 MG/1
1 TABLET ORAL NIGHTLY
Status: DISCONTINUED | OUTPATIENT
Start: 2018-01-01 | End: 2018-01-01 | Stop reason: HOSPADM

## 2018-01-01 RX ORDER — LANTHANUM CARBONATE 1000 MG/1
1000 TABLET, CHEWABLE ORAL
COMMUNITY
End: 2018-01-01

## 2018-01-01 RX ORDER — AZITHROMYCIN 250 MG/1
500 TABLET, FILM COATED ORAL ONCE
Status: COMPLETED | OUTPATIENT
Start: 2018-01-01 | End: 2018-01-01

## 2018-01-01 RX ORDER — HYDROCODONE BITARTRATE AND ACETAMINOPHEN 500; 5 MG/1; MG/1
TABLET ORAL
Status: DISCONTINUED | OUTPATIENT
Start: 2018-01-01 | End: 2018-01-01

## 2018-01-01 RX ORDER — LEVOTHYROXINE SODIUM 50 UG/1
50 TABLET ORAL
Status: DISCONTINUED | OUTPATIENT
Start: 2018-01-01 | End: 2018-01-01

## 2018-01-01 RX ORDER — OXYCODONE HCL 10 MG/1
30 TABLET, FILM COATED, EXTENDED RELEASE ORAL EVERY 12 HOURS
Status: DISCONTINUED | OUTPATIENT
Start: 2018-01-01 | End: 2018-01-01

## 2018-01-01 RX ORDER — CYCLOBENZAPRINE HCL 10 MG
10 TABLET ORAL DAILY PRN
COMMUNITY

## 2018-01-01 RX ORDER — ADENOSINE 3 MG/ML
INJECTION, SOLUTION INTRAVENOUS
Status: COMPLETED
Start: 2018-01-01 | End: 2018-01-01

## 2018-01-01 RX ORDER — OXYCODONE HYDROCHLORIDE 5 MG/1
15 TABLET ORAL EVERY 6 HOURS PRN
Status: DISCONTINUED | OUTPATIENT
Start: 2018-01-01 | End: 2018-01-01

## 2018-01-01 RX ORDER — CEFTRIAXONE 1 G/1
1 INJECTION, POWDER, FOR SOLUTION INTRAMUSCULAR; INTRAVENOUS
Status: COMPLETED | OUTPATIENT
Start: 2018-01-01 | End: 2018-01-01

## 2018-01-01 RX ORDER — HYDROCODONE BITARTRATE AND ACETAMINOPHEN 500; 5 MG/1; MG/1
TABLET ORAL CONTINUOUS
Status: DISCONTINUED | OUTPATIENT
Start: 2018-01-01 | End: 2018-01-01

## 2018-01-01 RX ORDER — LIDOCAINE HYDROCHLORIDE 10 MG/ML
10 INJECTION INFILTRATION; PERINEURAL ONCE
Status: DISCONTINUED | OUTPATIENT
Start: 2018-01-01 | End: 2018-01-01

## 2018-01-01 RX ORDER — CALCIUM ACETATE 667 MG/1
2001 CAPSULE ORAL
Status: DISCONTINUED | OUTPATIENT
Start: 2018-01-01 | End: 2018-01-01

## 2018-01-01 RX ORDER — ONDANSETRON 2 MG/ML
INJECTION INTRAMUSCULAR; INTRAVENOUS
Status: COMPLETED
Start: 2018-01-01 | End: 2018-01-01

## 2018-01-01 RX ORDER — NOREPINEPHRINE BITARTRATE/D5W 4MG/250ML
0.05 PLASTIC BAG, INJECTION (ML) INTRAVENOUS CONTINUOUS
Status: DISCONTINUED | OUTPATIENT
Start: 2018-01-01 | End: 2018-01-01

## 2018-01-01 RX ORDER — SODIUM BICARBONATE 1 MEQ/ML
50 SYRINGE (ML) INTRAVENOUS ONCE
Status: COMPLETED | OUTPATIENT
Start: 2018-01-01 | End: 2018-01-01

## 2018-01-01 RX ORDER — DIPHENHYDRAMINE HCL 50 MG
50 CAPSULE ORAL EVERY 6 HOURS
Status: DISCONTINUED | OUTPATIENT
Start: 2018-01-01 | End: 2018-01-01

## 2018-01-01 RX ORDER — NOREPINEPHRINE BITARTRATE/D5W 4MG/250ML
PLASTIC BAG, INJECTION (ML) INTRAVENOUS
Status: COMPLETED
Start: 2018-01-01 | End: 2018-01-01

## 2018-01-01 RX ORDER — MAGNESIUM SULFATE HEPTAHYDRATE 40 MG/ML
2 INJECTION, SOLUTION INTRAVENOUS
Status: DISCONTINUED | OUTPATIENT
Start: 2018-01-01 | End: 2018-01-01 | Stop reason: ALTCHOICE

## 2018-01-01 RX ORDER — NOREPINEPHRINE BITARTRATE/D5W 4MG/250ML
0.02 PLASTIC BAG, INJECTION (ML) INTRAVENOUS CONTINUOUS
Status: DISCONTINUED | OUTPATIENT
Start: 2018-01-01 | End: 2018-01-01

## 2018-01-01 RX ORDER — HYDROCODONE BITARTRATE AND ACETAMINOPHEN 500; 5 MG/1; MG/1
TABLET ORAL CONTINUOUS
Status: DISCONTINUED | OUTPATIENT
Start: 2018-01-01 | End: 2018-01-01 | Stop reason: ALTCHOICE

## 2018-01-01 RX ORDER — LIDOCAINE HYDROCHLORIDE 10 MG/ML
INJECTION INFILTRATION; PERINEURAL
Status: DISPENSED
Start: 2018-01-01 | End: 2018-01-01

## 2018-01-01 RX ORDER — POLYETHYLENE GLYCOL 3350 17 G/17G
17 POWDER, FOR SOLUTION ORAL DAILY
Status: DISCONTINUED | OUTPATIENT
Start: 2018-01-01 | End: 2018-01-01 | Stop reason: HOSPADM

## 2018-01-01 RX ORDER — MAGNESIUM SULFATE HEPTAHYDRATE 40 MG/ML
2 INJECTION, SOLUTION INTRAVENOUS
Status: DISCONTINUED | OUTPATIENT
Start: 2018-01-01 | End: 2018-01-01

## 2018-01-01 RX ORDER — MIDODRINE HYDROCHLORIDE 5 MG/1
10 TABLET ORAL ONCE
Status: COMPLETED | OUTPATIENT
Start: 2018-01-01 | End: 2018-01-01

## 2018-01-01 RX ORDER — CLOPIDOGREL BISULFATE 75 MG/1
75 TABLET ORAL DAILY
Status: DISCONTINUED | OUTPATIENT
Start: 2018-01-01 | End: 2018-01-01 | Stop reason: HOSPADM

## 2018-01-01 RX ORDER — SODIUM BICARBONATE 1 MEQ/ML
SYRINGE (ML) INTRAVENOUS
Status: COMPLETED
Start: 2018-01-01 | End: 2018-01-01

## 2018-01-01 RX ORDER — METOPROLOL SUCCINATE 50 MG/1
50 TABLET, EXTENDED RELEASE ORAL DAILY
Status: DISCONTINUED | OUTPATIENT
Start: 2018-01-01 | End: 2018-01-01

## 2018-01-01 RX ORDER — DIPHENOXYLATE HYDROCHLORIDE AND ATROPINE SULFATE 2.5; .025 MG/1; MG/1
2 TABLET ORAL EVERY 6 HOURS PRN
COMMUNITY

## 2018-01-01 RX ORDER — ADENOSINE 3 MG/ML
6 INJECTION, SOLUTION INTRAVENOUS ONCE
Status: COMPLETED | OUTPATIENT
Start: 2018-01-01 | End: 2018-01-01

## 2018-01-01 RX ORDER — CYCLOBENZAPRINE HCL 5 MG
5 TABLET ORAL ONCE
Status: COMPLETED | OUTPATIENT
Start: 2018-01-01 | End: 2018-01-01

## 2018-01-01 RX ORDER — SODIUM CHLORIDE 9 MG/ML
INJECTION, SOLUTION INTRAVENOUS ONCE
Status: DISCONTINUED | OUTPATIENT
Start: 2018-01-01 | End: 2018-01-01

## 2018-01-01 RX ORDER — AMIODARONE HYDROCHLORIDE 200 MG/1
200 TABLET ORAL DAILY
Status: DISCONTINUED | OUTPATIENT
Start: 2018-01-01 | End: 2018-01-01 | Stop reason: HOSPADM

## 2018-01-01 RX ORDER — CILOSTAZOL 50 MG/1
100 TABLET ORAL 2 TIMES DAILY
Status: DISCONTINUED | OUTPATIENT
Start: 2018-01-01 | End: 2018-01-01

## 2018-01-01 RX ORDER — SODIUM BICARBONATE 650 MG/1
1300 TABLET ORAL 2 TIMES DAILY
Status: DISCONTINUED | OUTPATIENT
Start: 2018-01-01 | End: 2018-01-01

## 2018-01-01 RX ORDER — FERROUS SULFATE 325(65) MG
325 TABLET, DELAYED RELEASE (ENTERIC COATED) ORAL DAILY
Status: DISCONTINUED | OUTPATIENT
Start: 2018-01-01 | End: 2018-01-01

## 2018-01-01 RX ORDER — AZITHROMYCIN 250 MG/1
250 TABLET, FILM COATED ORAL DAILY
Status: DISCONTINUED | OUTPATIENT
Start: 2018-01-01 | End: 2018-01-01

## 2018-01-01 RX ORDER — OXYCODONE HYDROCHLORIDE 5 MG/1
15 TABLET ORAL ONCE
Status: COMPLETED | OUTPATIENT
Start: 2018-01-01 | End: 2018-01-01

## 2018-01-01 RX ORDER — SEVELAMER CARBONATE 800 MG/1
800 TABLET, FILM COATED ORAL
Status: DISCONTINUED | OUTPATIENT
Start: 2018-01-01 | End: 2018-01-01

## 2018-01-01 RX ORDER — SODIUM CHLORIDE 0.9 % (FLUSH) 0.9 %
3 SYRINGE (ML) INJECTION EVERY 8 HOURS
Status: DISCONTINUED | OUTPATIENT
Start: 2018-01-01 | End: 2018-01-01 | Stop reason: HOSPADM

## 2018-01-01 RX ORDER — LIDOCAINE HYDROCHLORIDE 10 MG/ML
INJECTION INFILTRATION; PERINEURAL
Status: COMPLETED
Start: 2018-01-01 | End: 2018-01-01

## 2018-01-01 RX ORDER — LANTHANUM CARBONATE 500 MG/1
1000 TABLET, CHEWABLE ORAL
Status: DISCONTINUED | OUTPATIENT
Start: 2018-01-01 | End: 2018-01-01

## 2018-01-01 RX ORDER — ASPIRIN 81 MG/1
81 TABLET ORAL DAILY
Status: DISCONTINUED | OUTPATIENT
Start: 2018-01-01 | End: 2018-01-01 | Stop reason: HOSPADM

## 2018-01-01 RX ORDER — MIDODRINE HYDROCHLORIDE 5 MG/1
5 TABLET ORAL
Status: DISCONTINUED | OUTPATIENT
Start: 2018-01-01 | End: 2018-01-01

## 2018-01-01 RX ORDER — ESCITALOPRAM OXALATE 20 MG/1
20 TABLET ORAL DAILY
Status: DISCONTINUED | OUTPATIENT
Start: 2018-01-01 | End: 2018-01-01 | Stop reason: HOSPADM

## 2018-01-01 RX ORDER — FLUCONAZOLE 100 MG/1
100 TABLET ORAL DAILY
Status: COMPLETED | OUTPATIENT
Start: 2018-01-01 | End: 2018-01-01

## 2018-01-01 RX ORDER — LEVOTHYROXINE SODIUM 125 UG/1
125 TABLET ORAL
Status: DISCONTINUED | OUTPATIENT
Start: 2018-01-01 | End: 2018-01-01 | Stop reason: HOSPADM

## 2018-01-01 RX ORDER — PRAMIPEXOLE DIHYDROCHLORIDE 0.12 MG/1
0.5 TABLET ORAL 3 TIMES DAILY
Status: DISCONTINUED | OUTPATIENT
Start: 2018-01-01 | End: 2018-01-01

## 2018-01-01 RX ORDER — NITROGLYCERIN 0.4 MG/1
0.4 TABLET SUBLINGUAL EVERY 5 MIN PRN
Status: DISCONTINUED | OUTPATIENT
Start: 2018-01-01 | End: 2018-01-01 | Stop reason: HOSPADM

## 2018-01-01 RX ORDER — CALCITRIOL 0.25 UG/1
0.25 CAPSULE ORAL DAILY
Status: DISCONTINUED | OUTPATIENT
Start: 2018-01-01 | End: 2018-01-01 | Stop reason: HOSPADM

## 2018-01-01 RX ORDER — ONDANSETRON 2 MG/ML
4 INJECTION INTRAMUSCULAR; INTRAVENOUS EVERY 6 HOURS PRN
Status: DISCONTINUED | OUTPATIENT
Start: 2018-01-01 | End: 2018-01-01 | Stop reason: HOSPADM

## 2018-01-01 RX ORDER — LIDOCAINE AND PRILOCAINE 25; 25 MG/G; MG/G
CREAM TOPICAL
Status: DISCONTINUED | OUTPATIENT
Start: 2018-01-01 | End: 2018-01-01 | Stop reason: HOSPADM

## 2018-01-01 RX ORDER — DOXYCYCLINE HYCLATE 100 MG
100 TABLET ORAL EVERY 12 HOURS
Status: DISCONTINUED | OUTPATIENT
Start: 2018-01-01 | End: 2018-01-01

## 2018-01-01 RX ORDER — SODIUM,POTASSIUM PHOSPHATES 280-250MG
1 POWDER IN PACKET (EA) ORAL ONCE
Status: COMPLETED | OUTPATIENT
Start: 2018-01-01 | End: 2018-01-01

## 2018-01-01 RX ORDER — PROCHLORPERAZINE MALEATE 10 MG
10 TABLET ORAL 3 TIMES DAILY PRN
Status: DISCONTINUED | OUTPATIENT
Start: 2018-01-01 | End: 2018-01-01

## 2018-01-01 RX ORDER — PANTOPRAZOLE SODIUM 40 MG/1
40 TABLET, DELAYED RELEASE ORAL DAILY
Status: DISCONTINUED | OUTPATIENT
Start: 2018-01-01 | End: 2018-01-01 | Stop reason: HOSPADM

## 2018-01-01 RX ORDER — PROCHLORPERAZINE MALEATE 10 MG
10 TABLET ORAL 3 TIMES DAILY PRN
Status: DISCONTINUED | OUTPATIENT
Start: 2018-01-01 | End: 2018-01-01 | Stop reason: HOSPADM

## 2018-01-01 RX ORDER — MIDODRINE HYDROCHLORIDE 5 MG/1
5 TABLET ORAL 3 TIMES DAILY
Status: DISCONTINUED | OUTPATIENT
Start: 2018-01-01 | End: 2018-01-01

## 2018-01-01 RX ORDER — HYDROCODONE BITARTRATE AND ACETAMINOPHEN 500; 5 MG/1; MG/1
TABLET ORAL
Status: DISCONTINUED | OUTPATIENT
Start: 2018-01-01 | End: 2018-01-01 | Stop reason: ALTCHOICE

## 2018-01-01 RX ORDER — AMIODARONE HYDROCHLORIDE 200 MG/1
200 TABLET ORAL DAILY
Status: DISCONTINUED | OUTPATIENT
Start: 2018-01-01 | End: 2018-01-01

## 2018-01-01 RX ORDER — SODIUM,POTASSIUM PHOSPHATES 280-250MG
2 POWDER IN PACKET (EA) ORAL
Status: DISCONTINUED | OUTPATIENT
Start: 2018-01-01 | End: 2018-01-01

## 2018-01-01 RX ADMIN — ALPRAZOLAM 1 MG: 1 TABLET ORAL at 01:01

## 2018-01-01 RX ADMIN — GUAIFENESIN AND DEXTROMETHORPHAN HYDROBROMIDE 1 TABLET: 600; 30 TABLET, EXTENDED RELEASE ORAL at 09:01

## 2018-01-01 RX ADMIN — Medication 3 ML: at 01:01

## 2018-01-01 RX ADMIN — ATORVASTATIN CALCIUM 80 MG: 20 TABLET, FILM COATED ORAL at 08:01

## 2018-01-01 RX ADMIN — CLOPIDOGREL 75 MG: 75 TABLET, FILM COATED ORAL at 08:01

## 2018-01-01 RX ADMIN — POLYETHYLENE GLYCOL 3350 17 G: 17 POWDER, FOR SOLUTION ORAL at 09:01

## 2018-01-01 RX ADMIN — AMIODARONE HYDROCHLORIDE 1 MG/MIN: 1.8 INJECTION, SOLUTION INTRAVENOUS at 05:01

## 2018-01-01 RX ADMIN — PROCHLORPERAZINE MALEATE 10 MG: 10 TABLET, FILM COATED ORAL at 06:01

## 2018-01-01 RX ADMIN — Medication 250 MG: at 08:01

## 2018-01-01 RX ADMIN — PANTOPRAZOLE SODIUM 40 MG: 40 TABLET, DELAYED RELEASE ORAL at 09:01

## 2018-01-01 RX ADMIN — SODIUM BICARBONATE 50 MEQ: 84 INJECTION, SOLUTION INTRAVENOUS at 02:01

## 2018-01-01 RX ADMIN — OXYCODONE HYDROCHLORIDE 30 MG: 5 TABLET ORAL at 04:01

## 2018-01-01 RX ADMIN — CILOSTAZOL 100 MG: 50 TABLET ORAL at 09:01

## 2018-01-01 RX ADMIN — AMIODARONE HYDROCHLORIDE 200 MG: 200 TABLET ORAL at 09:01

## 2018-01-01 RX ADMIN — ATORVASTATIN CALCIUM 80 MG: 20 TABLET, FILM COATED ORAL at 09:01

## 2018-01-01 RX ADMIN — AMIODARONE HYDROCHLORIDE 200 MG: 200 TABLET ORAL at 08:01

## 2018-01-01 RX ADMIN — FLUCONAZOLE 100 MG: 100 TABLET ORAL at 08:01

## 2018-01-01 RX ADMIN — ASPIRIN 81 MG: 81 TABLET, COATED ORAL at 08:01

## 2018-01-01 RX ADMIN — CALCITRIOL 0.25 MCG: 0.25 CAPSULE, LIQUID FILLED ORAL at 09:01

## 2018-01-01 RX ADMIN — MAGNESIUM SULFATE IN WATER 2 G: 40 INJECTION, SOLUTION INTRAVENOUS at 04:01

## 2018-01-01 RX ADMIN — DOXYCYCLINE HYCLATE 100 MG: 100 TABLET, COATED ORAL at 09:01

## 2018-01-01 RX ADMIN — LEVOTHYROXINE SODIUM 125 MCG: 125 TABLET ORAL at 06:01

## 2018-01-01 RX ADMIN — PRAMIPEXOLE DIHYDROCHLORIDE 1 MG: 1 TABLET ORAL at 09:01

## 2018-01-01 RX ADMIN — OXYCODONE HYDROCHLORIDE 30 MG: 5 TABLET ORAL at 05:01

## 2018-01-01 RX ADMIN — PRAMIPEXOLE DIHYDROCHLORIDE 0.5 MG: 0.12 TABLET ORAL at 05:01

## 2018-01-01 RX ADMIN — LEVOTHYROXINE SODIUM 50 MCG: 50 TABLET ORAL at 05:01

## 2018-01-01 RX ADMIN — PRAMIPEXOLE DIHYDROCHLORIDE 1 MG: 1 TABLET ORAL at 08:01

## 2018-01-01 RX ADMIN — ALPRAZOLAM 1 MG: 1 TABLET ORAL at 10:01

## 2018-01-01 RX ADMIN — MAGNESIUM SULFATE IN WATER 2 G: 40 INJECTION, SOLUTION INTRAVENOUS at 02:01

## 2018-01-01 RX ADMIN — ALPRAZOLAM 1 MG: 1 TABLET ORAL at 11:01

## 2018-01-01 RX ADMIN — SODIUM CHLORIDE 250 ML: 0.9 INJECTION, SOLUTION INTRAVENOUS at 09:01

## 2018-01-01 RX ADMIN — FLUCONAZOLE 100 MG: 100 TABLET ORAL at 09:01

## 2018-01-01 RX ADMIN — Medication 0.02 MCG/KG/MIN: at 10:01

## 2018-01-01 RX ADMIN — Medication 3 ML: at 02:01

## 2018-01-01 RX ADMIN — AMIODARONE HYDROCHLORIDE 0.5 MG/MIN: 1.8 INJECTION, SOLUTION INTRAVENOUS at 11:01

## 2018-01-01 RX ADMIN — AZITHROMYCIN 250 MG: 250 TABLET, FILM COATED ORAL at 08:01

## 2018-01-01 RX ADMIN — OXYCODONE HYDROCHLORIDE 30 MG: 5 TABLET ORAL at 11:01

## 2018-01-01 RX ADMIN — OXYCODONE HYDROCHLORIDE 15 MG: 5 TABLET ORAL at 06:01

## 2018-01-01 RX ADMIN — CALCIUM ACETATE 2001 MG: 667 CAPSULE ORAL at 11:01

## 2018-01-01 RX ADMIN — ESCITALOPRAM 20 MG: 20 TABLET, FILM COATED ORAL at 08:01

## 2018-01-01 RX ADMIN — SODIUM CHLORIDE: 0.9 INJECTION, SOLUTION INTRAVENOUS at 03:01

## 2018-01-01 RX ADMIN — SODIUM CHLORIDE: 0.9 INJECTION, SOLUTION INTRAVENOUS at 02:01

## 2018-01-01 RX ADMIN — ALPRAZOLAM 1 MG: 1 TABLET ORAL at 09:01

## 2018-01-01 RX ADMIN — GUAIFENESIN AND DEXTROMETHORPHAN HYDROBROMIDE 1 TABLET: 600; 30 TABLET, EXTENDED RELEASE ORAL at 08:01

## 2018-01-01 RX ADMIN — Medication 3 ML: at 06:01

## 2018-01-01 RX ADMIN — ASPIRIN 81 MG: 81 TABLET, COATED ORAL at 09:01

## 2018-01-01 RX ADMIN — MIDODRINE HYDROCHLORIDE 5 MG: 5 TABLET ORAL at 10:01

## 2018-01-01 RX ADMIN — ESCITALOPRAM 20 MG: 20 TABLET, FILM COATED ORAL at 09:01

## 2018-01-01 RX ADMIN — Medication 0.05 MCG/KG/MIN: at 03:01

## 2018-01-01 RX ADMIN — MIDODRINE HYDROCHLORIDE 5 MG: 5 TABLET ORAL at 09:01

## 2018-01-01 RX ADMIN — PROCHLORPERAZINE MALEATE 10 MG: 10 TABLET, FILM COATED ORAL at 02:01

## 2018-01-01 RX ADMIN — SODIUM BICARBONATE: 84 INJECTION, SOLUTION INTRAVENOUS at 02:01

## 2018-01-01 RX ADMIN — LANTHANUM CARBONATE 1000 MG: 500 TABLET, CHEWABLE ORAL at 01:01

## 2018-01-01 RX ADMIN — ALPRAZOLAM 1 MG: 1 TABLET ORAL at 03:01

## 2018-01-01 RX ADMIN — ALBUTEROL SULFATE 2 PUFF: 90 AEROSOL, METERED RESPIRATORY (INHALATION) at 07:01

## 2018-01-01 RX ADMIN — PRAMIPEXOLE DIHYDROCHLORIDE 0.5 MG: 0.12 TABLET ORAL at 02:01

## 2018-01-01 RX ADMIN — CILOSTAZOL 100 MG: 50 TABLET ORAL at 08:01

## 2018-01-01 RX ADMIN — CEFTRIAXONE SODIUM 1 G: 1 INJECTION, POWDER, FOR SOLUTION INTRAMUSCULAR; INTRAVENOUS at 05:01

## 2018-01-01 RX ADMIN — Medication 3 ML: at 09:01

## 2018-01-01 RX ADMIN — MAGNESIUM SULFATE IN WATER 2 G: 40 INJECTION, SOLUTION INTRAVENOUS at 03:01

## 2018-01-01 RX ADMIN — Medication 3 ML: at 05:01

## 2018-01-01 RX ADMIN — ONDANSETRON 4 MG: 2 INJECTION INTRAMUSCULAR; INTRAVENOUS at 03:01

## 2018-01-01 RX ADMIN — NOREPINEPHRINE BITARTRATE 0.08 MCG/KG/MIN: 1 INJECTION, SOLUTION, CONCENTRATE INTRAVENOUS at 07:01

## 2018-01-01 RX ADMIN — SODIUM CHLORIDE: 0.9 INJECTION, SOLUTION INTRAVENOUS at 09:01

## 2018-01-01 RX ADMIN — AMIODARONE HYDROCHLORIDE 150 MG: 1.5 INJECTION, SOLUTION INTRAVENOUS at 05:01

## 2018-01-01 RX ADMIN — DOXYCYCLINE HYCLATE 100 MG: 100 TABLET, COATED ORAL at 08:01

## 2018-01-01 RX ADMIN — PANTOPRAZOLE SODIUM 40 MG: 40 TABLET, DELAYED RELEASE ORAL at 08:01

## 2018-01-01 RX ADMIN — ADENOSINE 6 MG: 3 INJECTION, SOLUTION INTRAVENOUS at 05:01

## 2018-01-01 RX ADMIN — OXYCODONE HYDROCHLORIDE 30 MG: 5 TABLET ORAL at 08:01

## 2018-01-01 RX ADMIN — MIDODRINE HYDROCHLORIDE 10 MG: 5 TABLET ORAL at 07:01

## 2018-01-01 RX ADMIN — CYCLOBENZAPRINE HYDROCHLORIDE 5 MG: 5 TABLET, FILM COATED ORAL at 10:01

## 2018-01-01 RX ADMIN — MIDODRINE HYDROCHLORIDE 5 MG: 5 TABLET ORAL at 06:01

## 2018-01-01 RX ADMIN — CLOPIDOGREL 75 MG: 75 TABLET, FILM COATED ORAL at 09:01

## 2018-01-01 RX ADMIN — OXYCODONE HYDROCHLORIDE 15 MG: 5 TABLET ORAL at 02:01

## 2018-01-01 RX ADMIN — LIDOCAINE HYDROCHLORIDE 20 MG: 10 INJECTION, SOLUTION INFILTRATION; PERINEURAL at 02:01

## 2018-01-01 RX ADMIN — SEVELAMER CARBONATE 800 MG: 800 TABLET, FILM COATED ORAL at 08:01

## 2018-01-01 RX ADMIN — POTASSIUM & SODIUM PHOSPHATES POWDER PACK 280-160-250 MG 2 PACKET: 280-160-250 PACK at 11:01

## 2018-01-01 RX ADMIN — METOPROLOL SUCCINATE 12.5 MG: 25 TABLET, EXTENDED RELEASE ORAL at 09:01

## 2018-01-01 RX ADMIN — Medication 250 MG: at 09:01

## 2018-01-01 RX ADMIN — CALCITRIOL 0.25 MCG: 0.25 CAPSULE, LIQUID FILLED ORAL at 08:01

## 2018-01-01 RX ADMIN — DOXYCYCLINE HYCLATE 100 MG: 100 TABLET, COATED ORAL at 11:01

## 2018-01-01 RX ADMIN — MIDODRINE HYDROCHLORIDE 5 MG: 5 TABLET ORAL at 08:01

## 2018-01-01 RX ADMIN — SODIUM PHOSPHATE, MONOBASIC, MONOHYDRATE 39.99 MMOL: 276; 142 INJECTION, SOLUTION INTRAVENOUS at 09:01

## 2018-01-01 RX ADMIN — LISINOPRIL 2.5 MG: 2.5 TABLET ORAL at 09:01

## 2018-01-01 RX ADMIN — AZITHROMYCIN 500 MG: 250 TABLET, FILM COATED ORAL at 01:01

## 2018-01-01 RX ADMIN — Medication 3 ML: at 10:01

## 2018-01-01 RX ADMIN — Medication 0.04 MCG/KG/MIN: at 08:01

## 2018-01-01 RX ADMIN — OXYCODONE HYDROCHLORIDE 30 MG: 10 TABLET, FILM COATED, EXTENDED RELEASE ORAL at 09:01

## 2018-01-01 RX ADMIN — OXYCODONE HYDROCHLORIDE 15 MG: 5 TABLET ORAL at 10:01

## 2018-01-01 RX ADMIN — ALUMINUM HYDROXIDE, MAGNESIUM HYDROXIDE, AND SIMETHICONE 50 ML: 200; 200; 20 SUSPENSION ORAL at 05:01

## 2018-01-01 RX ADMIN — MIDODRINE HYDROCHLORIDE 5 MG: 5 TABLET ORAL at 11:01

## 2018-01-01 RX ADMIN — LISINOPRIL 2.5 MG: 2.5 TABLET ORAL at 08:01

## 2018-01-01 RX ADMIN — MIDODRINE HYDROCHLORIDE 5 MG: 5 TABLET ORAL at 01:01

## 2018-01-01 RX ADMIN — CYCLOBENZAPRINE HYDROCHLORIDE 5 MG: 5 TABLET, FILM COATED ORAL at 04:01

## 2018-01-01 RX ADMIN — POTASSIUM & SODIUM PHOSPHATES POWDER PACK 280-160-250 MG 2 PACKET: 280-160-250 PACK at 09:01

## 2018-01-01 RX ADMIN — OXYCODONE HYDROCHLORIDE 30 MG: 5 TABLET ORAL at 09:01

## 2018-01-01 RX ADMIN — LANTHANUM CARBONATE 1000 MG: 500 TABLET, CHEWABLE ORAL at 11:01

## 2018-01-01 RX ADMIN — LEVOTHYROXINE SODIUM 125 MCG: 125 TABLET ORAL at 05:01

## 2018-01-01 RX ADMIN — LANTHANUM CARBONATE 1000 MG: 500 TABLET, CHEWABLE ORAL at 08:01

## 2018-01-01 RX ADMIN — SODIUM CHLORIDE: 0.9 INJECTION, SOLUTION INTRAVENOUS at 04:01

## 2018-01-01 RX ADMIN — FERROUS SULFATE TAB EC 325 MG (65 MG FE EQUIVALENT) 325 MG: 325 (65 FE) TABLET DELAYED RESPONSE at 08:01

## 2018-01-01 RX ADMIN — VASOPRESSIN 0.04 UNITS/MIN: 20 INJECTION INTRAVENOUS at 02:01

## 2018-01-01 RX ADMIN — MIDODRINE HYDROCHLORIDE 5 MG: 5 TABLET ORAL at 12:01

## 2018-01-01 RX ADMIN — MIDODRINE HYDROCHLORIDE 5 MG: 5 TABLET ORAL at 05:01

## 2018-01-01 RX ADMIN — OXYCODONE HYDROCHLORIDE 15 MG: 5 TABLET ORAL at 04:01

## 2018-01-01 RX ADMIN — NOREPINEPHRINE BITARTRATE 0.17 MCG/KG/MIN: 1 INJECTION, SOLUTION, CONCENTRATE INTRAVENOUS at 04:01

## 2018-01-01 RX ADMIN — OXYCODONE HYDROCHLORIDE 30 MG: 10 TABLET, FILM COATED, EXTENDED RELEASE ORAL at 08:01

## 2018-01-01 RX ADMIN — AMIODARONE HYDROCHLORIDE 200 MG: 200 TABLET ORAL at 10:01

## 2018-01-01 RX ADMIN — Medication 0.25 MCG/KG/MIN: at 05:01

## 2018-01-01 RX ADMIN — ESCITALOPRAM 20 MG: 20 TABLET, FILM COATED ORAL at 10:01

## 2018-01-01 RX ADMIN — OXYCODONE HYDROCHLORIDE 30 MG: 10 TABLET, FILM COATED, EXTENDED RELEASE ORAL at 04:01

## 2018-01-01 RX ADMIN — Medication 0.16 MCG/KG/MIN: at 02:01

## 2018-01-01 RX ADMIN — OXYCODONE HYDROCHLORIDE 15 MG: 5 TABLET ORAL at 09:01

## 2018-01-01 RX ADMIN — Medication 0.02 MCG/KG/MIN: at 03:01

## 2018-01-01 RX ADMIN — PRAMIPEXOLE DIHYDROCHLORIDE 0.5 MG: 0.12 TABLET ORAL at 09:01

## 2018-01-01 RX ADMIN — DEXTROSE MONOHYDRATE 0.05 MCG/KG/MIN: 5 INJECTION, SOLUTION INTRAVENOUS at 03:01

## 2018-01-01 RX ADMIN — AZITHROMYCIN 250 MG: 250 TABLET, FILM COATED ORAL at 09:01

## 2018-01-01 RX ADMIN — LANTHANUM CARBONATE 1000 MG: 500 TABLET, CHEWABLE ORAL at 05:01

## 2018-01-01 RX ADMIN — MIDODRINE HYDROCHLORIDE 10 MG: 5 TABLET ORAL at 08:01

## 2018-01-01 RX ADMIN — POTASSIUM & SODIUM PHOSPHATES POWDER PACK 280-160-250 MG 1 PACKET: 280-160-250 PACK at 12:01

## 2018-01-01 RX ADMIN — Medication 0.07 MCG/KG/MIN: at 05:01

## 2018-01-01 RX ADMIN — SODIUM BICARBONATE 650 MG TABLET 1300 MG: at 10:01

## 2018-01-01 RX ADMIN — Medication 0.2 MCG/KG/MIN: at 01:01

## 2018-01-01 RX ADMIN — NOREPINEPHRINE BITARTRATE 0.3 MCG/KG/MIN: 1 INJECTION, SOLUTION, CONCENTRATE INTRAVENOUS at 10:01

## 2018-01-01 RX ADMIN — CYCLOBENZAPRINE HYDROCHLORIDE 5 MG: 5 TABLET, FILM COATED ORAL at 01:01

## 2018-01-13 PROBLEM — I24.9 ACS (ACUTE CORONARY SYNDROME): Status: ACTIVE | Noted: 2018-01-01

## 2018-01-14 PROBLEM — E83.9 CHRONIC KIDNEY DISEASE-MINERAL AND BONE DISORDER: Status: ACTIVE | Noted: 2018-01-01

## 2018-01-14 PROBLEM — M89.9 CHRONIC KIDNEY DISEASE-MINERAL AND BONE DISORDER: Status: ACTIVE | Noted: 2018-01-01

## 2018-01-14 PROBLEM — N18.9 CHRONIC KIDNEY DISEASE-MINERAL AND BONE DISORDER: Status: ACTIVE | Noted: 2018-01-01

## 2018-01-14 PROBLEM — I20.0 UNSTABLE ANGINA: Status: ACTIVE | Noted: 2018-01-01

## 2018-01-14 PROBLEM — T14.90XA TRAUMA: Status: ACTIVE | Noted: 2018-01-01

## 2018-01-14 NOTE — CONSULTS
[facility]  Nephrology  Consult Note    Patient Name: Althea Hsieh  MRN: [id number]  Admission Date: 1/14/2018  Hospital Length of Stay: 0 days  Attending Provider: Ainsley More MD   Primary Care Physician: Davis Syed MD  Principal Problem:Unstable angina    Inpatient consult to Nephrology  Consult performed by: NGOZI TINAJERO  Consult ordered by: ANTHONY WORRELL  Reason for consult: ESRD; volume overload        Subjective:     HPI: Ms. Hsieh is a 61 yo WF with CAD s/p CABG 2016, PVD, COPD, ADPKD with renal and liver involvement, and ESRD on home HD who presented to an OSH on 1/14 with chest pressure, SOB, and new O2 requirement. She recently had a fall which resulted in L rib fractures and bleeding from both arms. She received 1 unit pRBC at OSH and was transferred to [facility] for further care. Upon further chart review she has been dealing with anemia of unknown source for some time. She receives [facility] outpatient. She has been sent to GI for colonoscopy but never went. She receives home HD via LUE AVF 5 days per week ([facility]). Each session is 2 hours 23 minutes. EDW 48kg. She no longer makes urine. She has low BP at baseline and takes midodrine with HD. She hasn't performed HD since Friday. She recent recent kidney cyst rupture which resulted in mild fever and hematuria; symptoms have since resolved. She currently denies any chest pressure. Troponin has trended from 0.15 to 3.67 and now 25.4. SBP 70-90s. She is developing worsening hypoxia on 5L NC. Nephrology consulted for urgent HD for volume overload. Primary team placing trialysis catheter now. Consent for dialysis obtained by patient and placed in chart.     Past Medical History:   Diagnosis Date    Aneurysm of cavernous portion of left internal carotid artery     Anticoagulant long-term use     Anxiety associated with depression     CHF (congestive heart failure)     COPD (chronic obstructive pulmonary disease)      Encounter for blood transfusion     ESRD (end stage renal disease) on dialysis     Hyperparathyroidism, secondary renal     Hypertension     Myocardial infarct     age 40,41 years     PLD (polycystic liver disease)     Polycystic kidney disease, autosomal dominant     Thyroid disease        Past Surgical History:   Procedure Laterality Date    CARDIAC SURGERY       SECTION, LOW TRANSVERSE  ,    CORONARY ANGIOPLASTY WITH STENT PLACEMENT      Has 2  stents    CORONARY ARTERY BYPASS GRAFT      DIALYSIS FISTULA CREATION      FEMORAL BYPASS      HYSTERECTOMY         Review of patient's allergies indicates:   Allergen Reactions    Sulfamethoxazole-trimethoprim Other (See Comments)     Pt is unsure if she is actually allergic to the medicine.      Current Facility-Administered Medications   Medication Frequency    lidocaine HCL 10 mg/ml (1%) 10 mg/mL (1 %) injection     0.9%  NaCl infusion (for blood administration) Q24H PRN    albuterol inhaler 2 puff Q6H PRN    ALPRAZolam tablet 1 mg TID PRN    amiodarone tablet 200 mg Daily    aspirin EC tablet 81 mg Daily    atorvastatin tablet 80 mg Daily    calcitRIOL capsule 0.25 mcg Daily    calcium acetate capsule 2,001 mg TID WM    cilostazol tablet 100 mg BID    clopidogrel tablet 75 mg Daily    escitalopram oxalate tablet 20 mg Daily    fluconazole tablet 100 mg Daily    levOCARNitine capsule 250 mg Daily    levothyroxine tablet 50 mcg Before breakfast    lidocaine HCL 10 mg/ml (1%) injection 10 mL Once    metoprolol succinate (TOPROL-XL) 24 hr tablet 50 mg Daily    midodrine tablet 5 mg TID WM    nitroGLYCERIN SL tablet 0.4 mg Q5 Min PRN    oxyCODONE 12 hr tablet 30 mg Q12H    pantoprazole EC tablet 40 mg Daily    pramipexole tablet 0.5 mg TID    prochlorperazine tablet 10 mg TID PRN    sodium chloride 0.9% flush 3 mL Q8H     Family History     Problem Relation (Age of Onset)    Alcohol abuse Brother    Cancer  Brother    Heart disease Brother    Hypertension Mother    Kidney disease Father, Sister, Brother    Polycystic kidney disease Sister        Social History Main Topics    Smoking status: Current Some Day Smoker     Packs/day: 1.50     Types: Cigarettes    Smokeless tobacco: Never Used      Comment: Smokes one to two packs/day    Alcohol use No    Drug use: No    Sexual activity: Yes     Partners: Male     Review of Systems   All other systems reviewed and are negative.    Objective:     Vital Signs (Most Recent):  Temp: 98.3 °F (36.8 °C) (01/14/18 1115)  Pulse: 85 (01/14/18 1300)  Resp: 18 (01/14/18 1300)  BP: (!) 94/46 (01/14/18 1300)  SpO2: (!) 90 % (01/14/18 1300)  O2 Device (Oxygen Therapy): nasal cannula w/ humidification (01/14/18 1300) Vital Signs (24h Range):  Temp:  [98 °F (36.7 °C)-99 °F (37.2 °C)] 98.3 °F (36.8 °C)  Pulse:  [77-97] 85  Resp:  [10-38] 18  SpO2:  [74 %-100 %] 90 %  BP: ()/(41-69) 94/46     Weight: 49 kg (108 lb 0.4 oz) (01/14/18 0405)  Body mass index is 21.1 kg/m².  Body surface area is 1.44 meters squared.    I/O last 3 completed shifts:  In: 120 [P.O.:120]  Out: -     Physical Exam   Constitutional: She is oriented to person, place, and time. She appears well-developed and well-nourished. No distress.   HENT:   Head: Normocephalic and atraumatic.   Eyes: Conjunctivae and EOM are normal.   Cardiovascular: Normal rate and regular rhythm.    Pulmonary/Chest: Tachypnea noted. She has rales.   Conversational dyspnea   Abdominal: She exhibits distension. There is no tenderness. There is no guarding.   Musculoskeletal: She exhibits edema. She exhibits no deformity.   Neurological: She is alert and oriented to person, place, and time.   Skin: Skin is warm and dry. She is not diaphoretic.       Significant Labs:  Cardiac Markers: No results for input(s): CKMB, TROPONINT, MYOGLOBIN in the last 168 hours.  CBC:   Recent Labs  Lab 01/14/18  1134   WBC 10.45   RBC 2.51*   HGB 7.4*   HCT  23.3*   *   MCV 93   MCH 29.5   MCHC 31.8*     CMP:   Recent Labs  Lab 01/14/18  0411   *   CALCIUM 7.8*   ALBUMIN 2.7*   PROT 6.4      K 4.4   CO2 18*      BUN 84*   CREATININE 9.1*   ALKPHOS 90   ALT 12   AST 35   BILITOT 1.0     All labs within the past 24 hours have been reviewed.    Significant Imaging:  Labs: Reviewed    Assessment/Plan:     ESRD on dialysis    - performs home hemodialysis via LUE AVF 5 days per week ([facility]); each treatment is 2 hours 23 minutes. EDW 48kg. No residual renal function  - last HD on Friday  - currently volume overloaded and hypoxic.   - unable to safely perform conventional HD in setting of ACS with rising troponin  - discussed with staff. Recommend trialysis catheter placement. Will start SLED for metabolic clearance with an attempt to match I/Os. Unfortunately will not be able to pull much fluid until ACS has stabilized/resolved.         Chronic kidney disease-mineral and bone disorder    - patient to take her home Auryxia (i.e., ferric citrate) TID w/meals while admitted  - continue calcitriol            Irina Clark, PGY-5  Nephrology Fellow  [facility]  Pager: [phone]    ATTENDING PHYSICIAN ATTESTATION  I have personally interviewed and examined the patient. I thoroughly reviewed the demographic, clinical, laboratorial and imaging information available in medical records. I agree with the assessment and recommendations provided by the subspecialty resident. Dr. Clark was under my supervision.

## 2018-01-14 NOTE — PLAN OF CARE
Problem: Patient Care Overview  Goal: Plan of Care Review  Outcome: Ongoing (interventions implemented as appropriate)  No acute events throughout day. See vital signs and assessments in flowsheets. See below for updates on today's progress.     Pulmonary: remains intubated. Failed SBT this morning. sats %     Cardiovascular: SR-ST with HR in 90-110s. BP started falling after addition of propofol to aid in sedation - levo gtt added for MAP >65.    Neurological: opens eyes spontaneously and moves extremities spontaneously, does not follow commands or track. Very restless on max dose of precedex so propofol added.     Gastrointestinal: OGT remains to LIWS with 200ml brown output. BM x 1.     Genitourinary: hannon remains in place with no urine output.    Integumentary/Other: plan to place trialysis line today at some point and start CRRT.     Infusions: Fent, Prop, Levo    Patient progressing towards goals as tolerated, plan of care communicated and reviewed with Althea Hsieh and daughter - Lavon. All concerns addressed. Will continue to monitor.

## 2018-01-14 NOTE — PLAN OF CARE
Problem: Patient Care Overview  Goal: Plan of Care Review  Outcome: Ongoing (interventions implemented as appropriate)  No acute events throughout day. See vital signs and assessments in flowsheets. See below for updates on today's progress.     Pulmonary: on 5-8L NC with sats 88-95%    Cardiovascular: SR with HR 80-90s, BP being taken on leg - pressure dropped after line placement and levo gtt started to keep MAP > 65 and in preparation of CRRT.     Neurological: AO X 4    Gastrointestinal: placed on renal diet      Genitourinary: anuric - pt on HD at home and starting CRRT      Integumentary/Other: large, weeping, bleeding skin tears to bilateral FA dressed with mepilex foam - changed PRN    Infusions: levo    Patient progressing towards goals as tolerated, plan of care communicated and reviewed with Althea Hsieh and . All concerns addressed. Will continue to monitor.

## 2018-01-14 NOTE — ASSESSMENT & PLAN NOTE
- patient to take her home Auryxia (i.e., ferric citrate) TID w/meals while admitted  - continue calcitriol

## 2018-01-14 NOTE — H&P
[facility]  Cardiology  History and Physical     Patient Name: Althea Hsieh  MRN: [id number]  Admission Date: 1/14/2018  Code Status: Full Code   Attending Provider: Ainsley More MD   Primary Care Physician: Davis Syed MD  Principal Problem:Unstable angina    Patient information was obtained from patient.     Subjective:     Chief Complaint:  Chest pressure.      HPI:  Ms. Hsieh is a 62 y.o. Lady with coronary artery disease s/p CABG 6/2016, remote PCI 1990s, ESRD on HD at home (no HD in 3 days), ADPKD, COPD, tobacco abuse, GIB PUD - ulcer many year ago, PVD s/p left Fem-pop due to non-healing ulcer on left leg from venous harvest, carotid artery aneurysm - who presents with chest heaviness, shortness of breath, and anemia.  She has had what sounds like exertional angina  - shortness of breath and chest heaviness - worse with exertion, relieved with rest - over the course of the past 3-4 months.  No symptoms at rest, has NTG but has not taken any.  Then this past Friday she was at home and tripped on a slipper, fell, and hit both of her arms as well as her left chest.  Since that time she has had superficial bleeding from both forearms which she has been unable to stop.  Also during this time she has had progressive chest heaviness requiring continuous use of O2 which she normally only requires PRN with exertion. She was evaluated at outside ED, found to have left sided rib fractures, wounds to both arms, anemia hgb down 7 from 9 this past week, and inferior and lateral ST depressions, was given 1 uPRBC and transferred to [facility]. She denies any chest heaviness currently, reports that as long as she is not moving around and has oxygen on she has no chest       Past Medical History:   Diagnosis Date    Aneurysm of cavernous portion of left internal carotid artery     Anxiety associated with depression     COPD (chronic obstructive pulmonary disease)     ESRD (end stage renal  disease) on dialysis     Hyperparathyroidism, secondary renal     Hypertension     Myocardial infarct     age [age] years     PLD (polycystic liver disease)     Polycystic kidney disease, autosomal dominant     Thyroid disease        Past Surgical History:   Procedure Laterality Date     SECTION, LOW TRANSVERSE  ,    CORONARY ANGIOPLASTY WITH STENT PLACEMENT      Has 2  stents    DIALYSIS FISTULA CREATION      HYSTERECTOMY         Review of patient's allergies indicates:   Allergen Reactions    Sulfamethoxazole-trimethoprim Other (See Comments)     Pt is unsure if she is actually allergic to the medicine.        Current Facility-Administered Medications on File Prior to Encounter   Medication    [COMPLETED] neomycin-bacitracin-polymyxin ointment    [COMPLETED] omnipaque 350 iohexol 100 mL    [DISCONTINUED] 0.9%  NaCl infusion (for blood administration)     Current Outpatient Prescriptions on File Prior to Encounter   Medication Sig    ALBUTEROL SULFATE (PROAIR HFA INHL) Inhale into the lungs.    ALPRAZolam (XANAX) 1 MG tablet Take 1 mg by mouth 3 (three) times daily as needed for Insomnia or Anxiety.    amiodarone (PACERONE) 200 MG Tab TAKE ONE TABLET BY MOUTH DAILY FOR HEART rhythm    ammonium lactate (LAC-HYDRIN) 12 % lotion Apply topically 2 (two) times daily. AAA LE    aspirin (ECOTRIN) 81 MG EC tablet Take 1 tablet (81 mg total) by mouth once daily.    atorvastatin (LIPITOR) 80 MG tablet TAKE ONE TABLET BY MOUTH DAILY after supper FOR CHOLESTEROL    calcitRIOL (ROCALTROL) 0.25 MCG Cap Take 0.25 mcg by mouth once daily.    cilostazol (PLETAL) 100 MG Tab TAKE ONE TABLET BY MOUTH TWICE DAILY FOR -leg circulation    clopidogrel (PLAVIX) 75 mg tablet Take 75 mg by mouth once daily.    escitalopram oxalate (LEXAPRO) 20 MG tablet Take 20 mg by mouth once daily.    ferric citrate (AURYXIA) 210 mg iron Tab Take 420 mg by mouth 3 (three) times daily. TAKE 2 TABLETS BY MOUTH  THREE TIMES A DAY WITH MEALS AND TAKE 1 TABLET BY MOUTH TWO TIMES A DAY WITH SNACKS    fluconazole (DIFLUCAN) 100 MG tablet Take 100 mg by mouth once daily.    levocarnitine (CARNITOR) 330 mg Tab Take 330 mg by mouth once daily.    levothyroxine (SYNTHROID) 50 MCG tablet 125 mcg. 1 tablet (50mg) by mouth every morning before breakfast. Excpet on Tuesdays take 2 tablets (100mg) before breakfast.    lidocaine-prilocaine (EMLA) cream APPLY TO AREA ONE TO TWO hours prior TO dialysis treatment    metoprolol succinate (TOPROL-XL) 50 MG 24 hr tablet Take 50 mg by mouth once daily.    midodrine (PROAMATINE) 5 MG Tab TAKE ONE TABLET BY MOUTH THREE TIMES DAILY TO HELP RAISE BLOOD PRESSURE    nitroGLYCERIN (NITROSTAT) 0.4 MG SL tablet Place 0.4 mg under the tongue every 5 (five) minutes as needed for Chest pain.    oxycodone (OXYCONTIN) 30 mg TR12 12 hr tablet Take 30 mg by mouth every 12 (twelve) hours.    pantoprazole (PROTONIX) 40 MG tablet Take 40 mg by mouth once daily.    pramipexole (MIRAPEX) 0.5 MG tablet Take 0.5 mg by mouth 3 (three) times daily.    prochlorperazine (COMPAZINE) 10 MG tablet Take 1 tablet (10 mg total) by mouth 3 (three) times daily as needed (with meals for nausea , 2 to 3  times a day prn). 1 Tablet Oral Before meals     Family History     Problem Relation (Age of Onset)    Alcohol abuse Brother    Cancer Brother    Heart disease Brother    Hypertension Mother    Kidney disease Father, Sister, Brother    Polycystic kidney disease Sister        Social History Main Topics    Smoking status: Current Some Day Smoker     Packs/day: 1.00    Smokeless tobacco: Never Used      Comment: Smokes one to two packs/day    Alcohol use No    Drug use: No    Sexual activity: Yes     Partners: Male     Review of Systems   All other systems reviewed and are negative.    Objective:     Vital Signs (Most Recent):  Temp: 99 °F (37.2 °C) (01/14/18 0155)  Pulse: 87 (01/14/18 0301)  Resp: 18 (01/14/18  0155)  BP: (!) 135/57 (01/14/18 0301)  SpO2: 100 % (01/14/18 0155) Vital Signs (24h Range):  Temp:  [98.1 °F (36.7 °C)-99 °F (37.2 °C)] 99 °F (37.2 °C)  Pulse:  [82-97] 87  Resp:  [10-29] 18  SpO2:  [74 %-100 %] 100 %  BP: ()/(43-69) 135/57     Weight: 47.2 kg (104 lb 0.9 oz)  Body mass index is 20.32 kg/m².    SpO2: 100 %  O2 Device (Oxygen Therapy): nasal cannula    No intake or output data in the 24 hours ending 01/14/18 0337    Lines/Drains/Airways     Central Venous Catheter Line                 Hemodialysis Catheter Arteriovenous fistula -- days          Drain                 Hemodialysis AV Fistula Left forearm -- days         Hemodialysis AV Graft Left upper arm -- days          Peripheral Intravenous Line                 Peripheral IV - Single Lumen 01/13/18 1841 Right Upper Arm less than 1 day         Peripheral IV - Single Lumen 01/14/18 0041 Right Antecubital less than 1 day         Peripheral IV - Single Lumen 01/14/18 Right Antecubital less than 1 day         Peripheral IV - Single Lumen 01/14/18 Right Forearm less than 1 day                Physical Exam   Constitutional: She is oriented to person, place, and time. She appears well-developed and well-nourished.   HENT:   Head: Normocephalic and atraumatic.   Neck: Normal range of motion. Neck supple. JVD (to jaw) present.   Cardiovascular: Normal rate, regular rhythm and normal heart sounds.  Exam reveals no gallop and no friction rub.    No murmur heard.  Pulmonary/Chest: Effort normal and breath sounds normal.   Difficulty with taking a deep breath due to rib fractures.    Abdominal: Soft. Bowel sounds are normal. She exhibits distension.   Musculoskeletal: Normal range of motion. She exhibits no edema.   Neurological: She is alert and oriented to person, place, and time.   Skin: Skin is warm and dry.       Significant Labs:   Recent Lab Results       01/13/18  2143 01/13/18  1900      Unit Blood Type Code 6200      Unit Expiration [id number]       Unit Blood Type A POS      ABO A      Albumin 3.0(L)      Alkaline Phosphatase 68      ALT 11(L)      Anion Gap 14      aPTT 27.6  Comment:  aPTT therapeutic range = 50-72 seconds      AST 28      Baso #  0.00     Basophil%  0.6     Total Bilirubin 0.5  Comment:  For infants and newborns, interpretation of results should be based  on gestational age, weight and in agreement with clinical  observations.  Premature Infant recommended reference ranges:  Up to 24 hours.............<8.0 mg/dL  Up to 48 hours............<12.0 mg/dL  3-5 days..................<15.0 mg/dL  6-29 days.................<15.0 mg/dL        BUN, Bld 85(H)      Calcium 7.9(L)      Chloride 104      CO2 20(L)      CODING SYSTEM QTQC002      Creatinine 8.9(H)      Differential Method  Automated     DISPENSE STATUS TRANSFUSED      eGFR if  5.0(A)      eGFR if non  4.3  Comment:  Calculation used to obtain the estimated glomerular filtration  rate (eGFR) is the CKD-EPI equation.   (A)      Eos #  0.1     Eosinophil%  1.7     Glucose 128(H)      Gran #  6.3     Gran%  76.4(H)     Hematocrit  24.4(L)     Hemoglobin  7.6(L)     Indirect [place] GEL NEG      Coumadin Monitoring INR 1.2  Comment:  Coumadin Therapy:  2.0 - 3.0 for INR for all indicators except mechanical heart valves  and antiphospholipid syndromes which should use 2.5 - 3.5.        Lymph #  1.3     Lymph%  15.7(L)     MCH  29.1     MCHC  31.3(L)     MCV  93     Mono #  0.5     Mono%  5.6     MPV  9.7     Platelets  105(L)     Potassium 4.1      PRODUCT CODE [id number]      Total Protein 6.4(L)      Protime 12.0(H)      RBC  2.62(L)     RDW  16.5(H)     Rh Type POS      Sodium 138      Troponin I 0.15  Comment:  TROP   critical result(s) called and verbal readback obtained from   NADIRA HOLCOMB, 01/13/2018 22:37  (HH)      UNIT NUMBER [id number]      WBC  8.30           Significant Imaging: EKG: NSR with ST depression in inferior, lateral leads and  st changes in V1 and V2 present in the past.     Assessment and Plan:     * Unstable angina    Long hx of CAD with PCI in 1977 and 1980 and CABG x 3 in 6/2016. Still smokes. Has had CP for 3-4 months. No nitro as has not been that bad. Had recent fall with bleeding and drop in hemoglobin to 7 from 9 with increasing chest pain. ST changes in V1 improving now isolated to one lead and had some changes in this lead prior on EKG from 11/2017. She also currently has NO chest heaviness or symptoms of angina - thus this does not appear to be a STEMI. ST depressions in inferior and lateral leads improving. This is most likely demand ischemia in the setting of underlying severe CAD, new anemia, COPD/hypoxia.    Will hold off on heparin gtt for now as she is bleeding. Currently getting transfused so will need to follow up Hemoglobin. Will trend troponin. Will get echo to evaluate for wall motion abnormalities. Will get interventional to see in the AM as she has no current chest pain. Continue ASA/Plavix.           Thrombocytopenia    Will follow.         PAD S/P femoral-popliteal bypass surgery    No current pain or non healing ulcers at present.  Continue ASA/Plavix and cilostazol        HTN (hypertension)    Continue current medications.         Tobacco abuse    Smoking cessation discussed         ESRD on dialysis    On MWF dialysis, last was on F, appears vol. Overloaded on exam. Nephrology consulted and called will dialyze in the am.         CAD (coronary artery disease)    Hx of PCI and CABG x 3. Will continue home medications.             VTE Risk Mitigation         Ordered     Low Risk of VTE  Once      01/14/18 0320          Gema Najera MD  Cardiology   [facility]

## 2018-01-14 NOTE — SUBJECTIVE & OBJECTIVE
Past Medical History:   Diagnosis Date    Aneurysm of cavernous portion of left internal carotid artery     Anxiety associated with depression     COPD (chronic obstructive pulmonary disease)     ESRD (end stage renal disease) on dialysis     Hyperparathyroidism, secondary renal     Hypertension     Myocardial infarct     age [age] years     PLD (polycystic liver disease)     Polycystic kidney disease, autosomal dominant     Thyroid disease        Past Surgical History:   Procedure Laterality Date     SECTION, LOW TRANSVERSE  ,    CORONARY ANGIOPLASTY WITH STENT PLACEMENT      Has 2  stents    DIALYSIS FISTULA CREATION      HYSTERECTOMY         Review of patient's allergies indicates:   Allergen Reactions    Sulfamethoxazole-trimethoprim Other (See Comments)     Pt is unsure if she is actually allergic to the medicine.        Current Facility-Administered Medications on File Prior to Encounter   Medication    [COMPLETED] neomycin-bacitracin-polymyxin ointment    [COMPLETED] omnipaque 350 iohexol 100 mL    [DISCONTINUED] 0.9%  NaCl infusion (for blood administration)     Current Outpatient Prescriptions on File Prior to Encounter   Medication Sig    ALBUTEROL SULFATE (PROAIR HFA INHL) Inhale into the lungs.    ALPRAZolam (XANAX) 1 MG tablet Take 1 mg by mouth 3 (three) times daily as needed for Insomnia or Anxiety.    amiodarone (PACERONE) 200 MG Tab TAKE ONE TABLET BY MOUTH DAILY FOR HEART rhythm    ammonium lactate (LAC-HYDRIN) 12 % lotion Apply topically 2 (two) times daily. AAA LE    aspirin (ECOTRIN) 81 MG EC tablet Take 1 tablet (81 mg total) by mouth once daily.    atorvastatin (LIPITOR) 80 MG tablet TAKE ONE TABLET BY MOUTH DAILY after supper FOR CHOLESTEROL    calcitRIOL (ROCALTROL) 0.25 MCG Cap Take 0.25 mcg by mouth once daily.    cilostazol (PLETAL) 100 MG Tab TAKE ONE TABLET BY MOUTH TWICE DAILY FOR -leg circulation    clopidogrel (PLAVIX) 75 mg tablet Take  75 mg by mouth once daily.    escitalopram oxalate (LEXAPRO) 20 MG tablet Take 20 mg by mouth once daily.    ferric citrate (AURYXIA) 210 mg iron Tab Take 420 mg by mouth 3 (three) times daily. TAKE 2 TABLETS BY MOUTH THREE TIMES A DAY WITH MEALS AND TAKE 1 TABLET BY MOUTH TWO TIMES A DAY WITH SNACKS    fluconazole (DIFLUCAN) 100 MG tablet Take 100 mg by mouth once daily.    levocarnitine (CARNITOR) 330 mg Tab Take 330 mg by mouth once daily.    levothyroxine (SYNTHROID) 50 MCG tablet 125 mcg. 1 tablet (50mg) by mouth every morning before breakfast. Excpet on Tuesdays take 2 tablets (100mg) before breakfast.    lidocaine-prilocaine (EMLA) cream APPLY TO AREA ONE TO TWO hours prior TO dialysis treatment    metoprolol succinate (TOPROL-XL) 50 MG 24 hr tablet Take 50 mg by mouth once daily.    midodrine (PROAMATINE) 5 MG Tab TAKE ONE TABLET BY MOUTH THREE TIMES DAILY TO HELP RAISE BLOOD PRESSURE    nitroGLYCERIN (NITROSTAT) 0.4 MG SL tablet Place 0.4 mg under the tongue every 5 (five) minutes as needed for Chest pain.    oxycodone (OXYCONTIN) 30 mg TR12 12 hr tablet Take 30 mg by mouth every 12 (twelve) hours.    pantoprazole (PROTONIX) 40 MG tablet Take 40 mg by mouth once daily.    pramipexole (MIRAPEX) 0.5 MG tablet Take 0.5 mg by mouth 3 (three) times daily.    prochlorperazine (COMPAZINE) 10 MG tablet Take 1 tablet (10 mg total) by mouth 3 (three) times daily as needed (with meals for nausea , 2 to 3  times a day prn). 1 Tablet Oral Before meals     Family History     Problem Relation (Age of Onset)    Alcohol abuse Brother    Cancer Brother    Heart disease Brother    Hypertension Mother    Kidney disease Father, Sister, Brother    Polycystic kidney disease Sister        Social History Main Topics    Smoking status: Current Some Day Smoker     Packs/day: 1.00    Smokeless tobacco: Never Used      Comment: Smokes one to two packs/day    Alcohol use No    Drug use: No    Sexual activity: Yes      Partners: Male     Review of Systems   All other systems reviewed and are negative.    Objective:     Vital Signs (Most Recent):  Temp: 99 °F (37.2 °C) (01/14/18 0155)  Pulse: 87 (01/14/18 0301)  Resp: 18 (01/14/18 0155)  BP: (!) 135/57 (01/14/18 0301)  SpO2: 100 % (01/14/18 0155) Vital Signs (24h Range):  Temp:  [98.1 °F (36.7 °C)-99 °F (37.2 °C)] 99 °F (37.2 °C)  Pulse:  [82-97] 87  Resp:  [10-29] 18  SpO2:  [74 %-100 %] 100 %  BP: ()/(43-69) 135/57     Weight: 47.2 kg (104 lb 0.9 oz)  Body mass index is 20.32 kg/m².    SpO2: 100 %  O2 Device (Oxygen Therapy): nasal cannula    No intake or output data in the 24 hours ending 01/14/18 0337    Lines/Drains/Airways     Central Venous Catheter Line                 Hemodialysis Catheter Arteriovenous fistula -- days          Drain                 Hemodialysis AV Fistula Left forearm -- days         Hemodialysis AV Graft Left upper arm -- days          Peripheral Intravenous Line                 Peripheral IV - Single Lumen 01/13/18 1841 Right Upper Arm less than 1 day         Peripheral IV - Single Lumen 01/14/18 0041 Right Antecubital less than 1 day         Peripheral IV - Single Lumen 01/14/18 Right Antecubital less than 1 day         Peripheral IV - Single Lumen 01/14/18 Right Forearm less than 1 day                Physical Exam   Constitutional: She is oriented to person, place, and time. She appears well-developed and well-nourished.   HENT:   Head: Normocephalic and atraumatic.   Neck: Normal range of motion. Neck supple. JVD (to jaw) present.   Cardiovascular: Normal rate, regular rhythm and normal heart sounds.  Exam reveals no gallop and no friction rub.    No murmur heard.  Pulmonary/Chest: Effort normal and breath sounds normal.   Difficulty with taking a deep breath due to rib fractures.    Abdominal: Soft. Bowel sounds are normal. She exhibits distension.   Musculoskeletal: Normal range of motion. She exhibits no edema.   Neurological: She is alert  and oriented to person, place, and time.   Skin: Skin is warm and dry.       Significant Labs:   Recent Lab Results       01/13/18 2143 01/13/18  1900      Unit Blood Type Code 6200      Unit Expiration [id number]      Unit Blood Type A POS      ABO A      Albumin 3.0(L)      Alkaline Phosphatase 68      ALT 11(L)      Anion Gap 14      aPTT 27.6  Comment:  aPTT therapeutic range = 50-72 seconds      AST 28      Baso #  0.00     Basophil%  0.6     Total Bilirubin 0.5  Comment:  For infants and newborns, interpretation of results should be based  on gestational age, weight and in agreement with clinical  observations.  Premature Infant recommended reference ranges:  Up to 24 hours.............<8.0 mg/dL  Up to 48 hours............<12.0 mg/dL  3-5 days..................<15.0 mg/dL  6-29 days.................<15.0 mg/dL        BUN, Bld 85(H)      Calcium 7.9(L)      Chloride 104      CO2 20(L)      CODING SYSTEM [id number]      Creatinine 8.9(H)      Differential Method  Automated     DISPENSE STATUS TRANSFUSED      eGFR if  5.0(A)      eGFR if non  4.3  Comment:  Calculation used to obtain the estimated glomerular filtration  rate (eGFR) is the CKD-EPI equation.   (A)      Eos #  0.1     Eosinophil%  1.7     Glucose 128(H)      Gran #  6.3     Gran%  76.4(H)     Hematocrit  24.4(L)     Hemoglobin  7.6(L)     Indirect [place] GEL NEG      Coumadin Monitoring INR 1.2  Comment:  Coumadin Therapy:  2.0 - 3.0 for INR for all indicators except mechanical heart valves  and antiphospholipid syndromes which should use 2.5 - 3.5.        Lymph #  1.3     Lymph%  15.7(L)     MCH  29.1     MCHC  31.3(L)     MCV  93     Mono #  0.5     Mono%  5.6     MPV  9.7     Platelets  105(L)     Potassium 4.1      PRODUCT CODE [id number]      Total Protein 6.4(L)      Protime 12.0(H)      RBC  2.62(L)     RDW  16.5(H)     Rh Type POS      Sodium 138      Troponin I 0.15  Comment:  TROP   critical result(s) called  and verbal readback obtained from   NADIRA HOLCOMB, 01/13/2018 22:37  ()      UNIT NUMBER [id number]      WBC  8.30           Significant Imaging: EKG: NSR with ST depression in inferior, lateral leads and st changes in V1 and V2 present in the past.

## 2018-01-14 NOTE — PLAN OF CARE
Problem: Patient Care Overview  Goal: Plan of Care Review  Outcome: Ongoing (interventions implemented as appropriate)  Prbc on hold until HD today due to excess fluid on lungs.  Increased O2 requirements 5-6 L high flow.  Pain to lower left back controlled with Oxycontin 3/10.  Wound care consult for bilateral FA abrasions from fall.  Troponin 3.67, called to Cardiology, Dr. Najera. Pt denies CP or SOB at this time.

## 2018-01-14 NOTE — SUBJECTIVE & OBJECTIVE
Past Medical History:   Diagnosis Date    Aneurysm of cavernous portion of left internal carotid artery     Anticoagulant long-term use     Anxiety associated with depression     CHF (congestive heart failure)     COPD (chronic obstructive pulmonary disease)     Encounter for blood transfusion     ESRD (end stage renal disease) on dialysis     Hyperparathyroidism, secondary renal     Hypertension     Myocardial infarct     age [age] years     PLD (polycystic liver disease)     Polycystic kidney disease, autosomal dominant     Thyroid disease        Past Surgical History:   Procedure Laterality Date    CARDIAC SURGERY       SECTION, LOW TRANSVERSE  ,    CORONARY ANGIOPLASTY WITH STENT PLACEMENT      Has 2  stents    CORONARY ARTERY BYPASS GRAFT      DIALYSIS FISTULA CREATION      FEMORAL BYPASS      HYSTERECTOMY         Review of patient's allergies indicates:   Allergen Reactions    Sulfamethoxazole-trimethoprim Other (See Comments)     Pt is unsure if she is actually allergic to the medicine.      Current Facility-Administered Medications   Medication Frequency    lidocaine HCL 10 mg/ml (1%) 10 mg/mL (1 %) injection     0.9%  NaCl infusion (for blood administration) Q24H PRN    albuterol inhaler 2 puff Q6H PRN    ALPRAZolam tablet 1 mg TID PRN    amiodarone tablet 200 mg Daily    aspirin EC tablet 81 mg Daily    atorvastatin tablet 80 mg Daily    calcitRIOL capsule 0.25 mcg Daily    calcium acetate capsule 2,001 mg TID WM    cilostazol tablet 100 mg BID    clopidogrel tablet 75 mg Daily    escitalopram oxalate tablet 20 mg Daily    fluconazole tablet 100 mg Daily    levOCARNitine capsule 250 mg Daily    levothyroxine tablet 50 mcg Before breakfast    lidocaine HCL 10 mg/ml (1%) injection 10 mL Once    metoprolol succinate (TOPROL-XL) 24 hr tablet 50 mg Daily    midodrine tablet 5 mg TID WM    nitroGLYCERIN SL tablet 0.4 mg Q5 Min PRN    oxyCODONE 12 hr  tablet 30 mg Q12H    pantoprazole EC tablet 40 mg Daily    pramipexole tablet 0.5 mg TID    prochlorperazine tablet 10 mg TID PRN    sodium chloride 0.9% flush 3 mL Q8H     Family History     Problem Relation (Age of Onset)    Alcohol abuse Brother    Cancer Brother    Heart disease Brother    Hypertension Mother    Kidney disease Father, Sister, Brother    Polycystic kidney disease Sister        Social History Main Topics    Smoking status: Current Some Day Smoker     Packs/day: 1.50     Types: Cigarettes    Smokeless tobacco: Never Used      Comment: Smokes one to two packs/day    Alcohol use No    Drug use: No    Sexual activity: Yes     Partners: Male     Review of Systems   All other systems reviewed and are negative.    Objective:     Vital Signs (Most Recent):  Temp: 98.3 °F (36.8 °C) (01/14/18 1115)  Pulse: 85 (01/14/18 1300)  Resp: 18 (01/14/18 1300)  BP: (!) 94/46 (01/14/18 1300)  SpO2: (!) 90 % (01/14/18 1300)  O2 Device (Oxygen Therapy): nasal cannula w/ humidification (01/14/18 1300) Vital Signs (24h Range):  Temp:  [98 °F (36.7 °C)-99 °F (37.2 °C)] 98.3 °F (36.8 °C)  Pulse:  [77-97] 85  Resp:  [10-38] 18  SpO2:  [74 %-100 %] 90 %  BP: ()/(41-69) 94/46     Weight: 49 kg (108 lb 0.4 oz) (01/14/18 0405)  Body mass index is 21.1 kg/m².  Body surface area is 1.44 meters squared.    I/O last 3 completed shifts:  In: 120 [P.O.:120]  Out: -     Physical Exam   Constitutional: She is oriented to person, place, and time. She appears well-developed and well-nourished. No distress.   HENT:   Head: Normocephalic and atraumatic.   Eyes: Conjunctivae and EOM are normal.   Cardiovascular: Normal rate and regular rhythm.    Pulmonary/Chest: Tachypnea noted. She has rales.   Conversational dyspnea   Abdominal: She exhibits distension. There is no tenderness. There is no guarding.   Musculoskeletal: She exhibits edema. She exhibits no deformity.   Neurological: She is alert and oriented to person, place,  and time.   Skin: Skin is warm and dry. She is not diaphoretic.       Significant Labs:  Cardiac Markers: No results for input(s): CKMB, TROPONINT, MYOGLOBIN in the last 168 hours.  CBC:   Recent Labs  Lab 01/14/18  1134   WBC 10.45   RBC 2.51*   HGB 7.4*   HCT 23.3*   *   MCV 93   MCH 29.5   MCHC 31.8*     CMP:   Recent Labs  Lab 01/14/18  0411   *   CALCIUM 7.8*   ALBUMIN 2.7*   PROT 6.4      K 4.4   CO2 18*      BUN 84*   CREATININE 9.1*   ALKPHOS 90   ALT 12   AST 35   BILITOT 1.0     All labs within the past 24 hours have been reviewed.    Significant Imaging:  Labs: Reviewed

## 2018-01-14 NOTE — PROGRESS NOTES
Ms. Hsieh is a 62 y.o. Lady with coronary artery disease s/p CABG 6/2016, remote PCI 1990s, ESRD on HD at home (no HD in 3 days), ADPKD, COPD, tobacco abuse, GIB PUD - ulcer many year ago, PVD s/p left Fem-pop due to non-healing ulcer on left leg from venous harvest, carotid artery aneurysm - who presents with chest heaviness, shortness of breath, and anemia.  She has had what sounds like exertional angina  - shortness of breath and chest heaviness - worse with exertion, relieved with rest - over the course of the past month.  No symptoms at rest, has NTG but has not taken any, and symptoms have been present for a month.  Then this past Friday she was at home and tripped on a slipper, fell, and hit both of her arms as well as her left chest.  Since that time she has had superficial bleeding from both forearms which she has been unable to stop.  Also during this time she has had progressive chest heaviness requiring continuous use of O2 which she normally only requires PRN with exertion. She was evaluated at outside ED, found to have left sided rib fractures, wounds to both arms, anemia hgb down 7 from 9 this past week, and inferior and lateral ST depressions, was given 1 uPRBC and transferred to [facility]. She denies any chest heaviness currently, reports that as long as she is not moving around and has oxygen on she has no chest pain or heaviness.    On arrival HR 92, /45, on exam taking small breaths due to pain from full inspiration with rib fractures, alert and oriented answering questions appropriately, bilateral basilar crackles, tender to palpation over left inferior posterior chest wall with ecchymoses.  Bilateral forarms with bandages in place.  Elevated JVD to mandible. R/R/R no m,r,g.  Abdomen with slight distension, chronic. Bilateral lower extremities with weak peripheral pulses but warm to touch.      EKG here with improving ST depressions, also improving ST elevation in V1, noted to have some old  changes in V1 as well, also currently denies any chest heaviness.    A/P: Unstable angina - ST changes in V1 improving now isolated to one lead and had some changes in this lead prior on EKG from 11/2017.  She also currently has NO chest heaviness or symptoms of angina - thus this does not appear to be a STEMI.  ST depressions in inferior and lateral leads improving.  This is most likely demand ischemia in the setting of underlying severe CAD, new anemia, COPD/hypoxia.    -Admit to CCU  -O2  -Transfuse  pRBC  -Continue ASA/Plavix, would not start heparin at this time given this appears to be demand ischemia from anemia and bleeding  -Continue local treatment of wounds    Rizwan Waddell MD

## 2018-01-14 NOTE — EICU
Called to bedside for trialysis line placement.  Present are MDs and RN..  Pt AAO.  Pt confirms name and date of birth.  Pt states she has given consent for line placement.  Physician verification completed.  Time out completed.    1343 - Central line placed to (R) IJ per MD FAIZA Vaca using u/s guidance.  Good blood return noted per all ports.  Lines flush easily.  Sterile procedure maintained.  Pt tolerated procedure well.  PCXR ordered.

## 2018-01-14 NOTE — HPI
Ms. Hsieh is a 62 y.o. Lady with coronary artery disease s/p CABG 6/2016, remote PCI 1990s, ESRD on HD at home (no HD in 3 days), ADPKD, COPD, tobacco abuse, GIB PUD - ulcer many year ago, PVD s/p left Fem-pop due to non-healing ulcer on left leg from venous harvest, carotid artery aneurysm - who presents with chest heaviness, shortness of breath, and anemia.  She has had what sounds like exertional angina  - shortness of breath and chest heaviness - worse with exertion, relieved with rest - over the course of the past 3-4 months.  No symptoms at rest, has NTG but has not taken any.  Then this past Friday she was at home and tripped on a slipper, fell, and hit both of her arms as well as her left chest.  Since that time she has had superficial bleeding from both forearms which she has been unable to stop.  Also during this time she has had progressive chest heaviness requiring continuous use of O2 which she normally only requires PRN with exertion. She was evaluated at outside ED, found to have left sided rib fractures, wounds to both arms, anemia hgb down 7 from 9 this past week, and inferior and lateral ST depressions, was given 1 uPRBC and transferred to [facility]. She denies any chest heaviness currently, reports that as long as she is not moving around and has oxygen on she has no chest

## 2018-01-14 NOTE — PROCEDURES
"Althea Hsieh is a 62 y.o. female patient.    Temp: 98.3 °F (36.8 °C) (01/14/18 1115)  Pulse: 85 (01/14/18 1300)  Resp: 18 (01/14/18 1300)  BP: (!) 94/46 (01/14/18 1300)  SpO2: (!) 90 % (01/14/18 1300)  Weight: 49 kg (108 lb 0.4 oz) (01/14/18 0405)  Height: 5' (152.4 cm) (01/14/18 0402)       Central Line  Date/Time: 1/14/2018 1:59 PM  Location procedure was performed: [facility] CRIT[facility]INE  Performed by: ED RICHARDSON  Assisting provider: KENDRA RICO  Pre-operative Diagnosis: NSTEMI  Post-operative diagnosis: NSTEMI  Consent Done: Yes  Time out: Immediately prior to procedure a "time out" was called to verify the correct patient, procedure, equipment, support staff and site/side marked as required.  Indications: hemodialysis  Anesthesia: local infiltration    Anesthesia:  Local Anesthetic: lidocaine 1% without epinephrine  Preparation: skin prepped with ChloraPrep  Sterile barriers: all five maximum sterile barriers used - cap, mask, sterile gown, sterile gloves, and large sterile sheet  Location details: right internal jugular  Catheter type: trialysis  Catheter size: 13 Fr  Ultrasound guidance: yes  Needle advanced into vessel with real time Ultrasound guidance.  Guidewire confirmed in vessel.  Sterile sheath used.  Manometry: Yes  Number of attempts: 1  Estimated blood loss (mL): 5  Post-procedure: line sutured,  chlorhexidine patch,  sterile dressing applied and blood return through all ports  Comments: Chest Xray pending placement          Ed Richardson  1/14/2018  "

## 2018-01-14 NOTE — ASSESSMENT & PLAN NOTE
On MWF dialysis, last was on F, appears vol. Overloaded on exam. Nephrology consulted and called will dialyze in the am.

## 2018-01-14 NOTE — PROGRESS NOTES
ESRD patient admitted with chest pressure. Consulted for HD for volume overload.  Currently with rising troponin, worsening hypoxia, and BP 79/41.   Will not be able to remove fluid via HD with her current hypotension.  Will continue to trend vitals and labs throughout the day. If emergent dialysis is felt to be needed and BP remains low, recommend temporary dialysis catheter for SLED.     Full consult note to follow. Please call with any questions.    Irina Clark, PGY-5  Nephrology Fellow  [facility]  Pager: [phone]

## 2018-01-14 NOTE — ED TRIAGE NOTES
62 year old female pt presents to the ed as a transfer from [facility] for cardiology consult after having a elevated troponin and abnormal ekg secondary to a fall at home. Pt states she has been weak and tired. Pt denies loc after falling. pts fall resulted in 2 broken ribs. Pt denies sob or nausea.pt is awake alert and oriented x 4. Pt actively getting blood upon arrival. Pt is a dialysis who does dialysis at home.

## 2018-01-14 NOTE — HPI
Ms. Hsieh is a 63 yo WF with CAD s/p CABG 2016, PVD, COPD, ADPKD with renal and liver involvement, and ESRD on home HD who presented to an OSH on 1/14 with chest pressure, SOB, and new O2 requirement. She recently had a fall which resulted in L rib fractures and bleeding from both arms. She received 1 unit pRBC at OSH and was transferred to [facility] for further care. Upon further chart review she has been dealing with anemia of unknown source for some time. She receives [facility] outpatient. She has been sent to GI for colonoscopy but never went. She receives home HD via LUE AVF 5 days per week ([facility]). Each session is 2 hours 23 minutes. EDW 48kg. She no longer makes urine. She has low BP at baseline and takes midodrine with HD. She hasn't performed HD since Friday. She recent recent kidney cyst rupture which resulted in mild fever and hematuria; symptoms have since resolved. She currently denies any chest pressure. Troponin has trended from 0.15 to 3.67 and now 25.4. SBP 70-90s. She is developing worsening hypoxia on 5L NC. Nephrology consulted for urgent HD for volume overload. Primary team placing trialysis catheter now. Consent for dialysis obtained by patient and placed in chart.

## 2018-01-14 NOTE — PROGRESS NOTES
Notified Cardiology, Dr Najera of increased Troponin to 3.67 from 0.15.  Ok to hold 1 unit PRBC until hemodialysis due to increased O2 needs and diffuse fine and course crackles to bilateral mid and lower lung fields

## 2018-01-14 NOTE — ASSESSMENT & PLAN NOTE
- performs home hemodialysis via LUE AVF 5 days per week ([facility]); each treatment is 2 hours 23 minutes. EDW 48kg. No residual renal function  - last HD on Friday  - currently volume overloaded and hypoxic.   - unable to safely perform conventional HD in setting of ACS with rising troponin  - discussed with staff. Recommend trialysis catheter placement. Will start SLED for metabolic clearance with an attempt to match I/Os. Unfortunately will not be able to pull much fluid until ACS has stabilized/resolved.

## 2018-01-14 NOTE — ASSESSMENT & PLAN NOTE
Long hx of CAD with PCI in 1977 and 1980 and CABG x 3 in 6/2016. Still smokes. Has had CP for 3-4 months. No nitro as has not been that bad. Had recent fall with bleeding and drop in hemoglobin to 7 from 9 with increasing chest pain. ST changes in V1 improving now isolated to one lead and had some changes in this lead prior on EKG from 11/2017. She also currently has NO chest heaviness or symptoms of angina - thus this does not appear to be a STEMI. ST depressions in inferior and lateral leads improving. This is most likely demand ischemia in the setting of underlying severe CAD, new anemia, COPD/hypoxia.    Will hold off on heparin gtt for now as she is bleeding. Currently getting transfused so will need to follow up Hemoglobin. Will trend troponin. Will get echo to evaluate for wall motion abnormalities. Will get interventional to see in the AM as she has no current chest pain. Continue ASA/Plavix.

## 2018-01-14 NOTE — SUBJECTIVE & OBJECTIVE
Past Medical History:   Diagnosis Date    Aneurysm of cavernous portion of left internal carotid artery     Anticoagulant long-term use     Anxiety associated with depression     CHF (congestive heart failure)     COPD (chronic obstructive pulmonary disease)     Encounter for blood transfusion     ESRD (end stage renal disease) on dialysis     Hyperparathyroidism, secondary renal     Hypertension     Myocardial infarct     age [age] years     PLD (polycystic liver disease)     Polycystic kidney disease, autosomal dominant     Thyroid disease        Past Surgical History:   Procedure Laterality Date    CARDIAC SURGERY       SECTION, LOW TRANSVERSE  ,    CORONARY ANGIOPLASTY WITH STENT PLACEMENT      Has 2  stents    CORONARY ARTERY BYPASS GRAFT      DIALYSIS FISTULA CREATION      FEMORAL BYPASS      HYSTERECTOMY         Review of patient's allergies indicates:   Allergen Reactions    Sulfamethoxazole-trimethoprim Other (See Comments)     Pt is unsure if she is actually allergic to the medicine.        PTA Medications   Medication Sig    ALBUTEROL SULFATE (PROAIR HFA INHL) Inhale into the lungs.    ALPRAZolam (XANAX) 1 MG tablet Take 1 mg by mouth 3 (three) times daily as needed for Insomnia or Anxiety.    amiodarone (PACERONE) 200 MG Tab TAKE ONE TABLET BY MOUTH DAILY FOR HEART rhythm    ammonium lactate (LAC-HYDRIN) 12 % lotion Apply topically 2 (two) times daily. AAA LE    aspirin (ECOTRIN) 81 MG EC tablet Take 1 tablet (81 mg total) by mouth once daily.    atorvastatin (LIPITOR) 80 MG tablet TAKE ONE TABLET BY MOUTH DAILY after supper FOR CHOLESTEROL    calcitRIOL (ROCALTROL) 0.25 MCG Cap Take 0.25 mcg by mouth once daily.    cilostazol (PLETAL) 100 MG Tab TAKE ONE TABLET BY MOUTH TWICE DAILY FOR -leg circulation    clopidogrel (PLAVIX) 75 mg tablet Take 75 mg by mouth once daily.    escitalopram oxalate (LEXAPRO) 20 MG tablet Take 20 mg by mouth once daily.     ferric citrate (AURYXIA) 210 mg iron Tab Take 420 mg by mouth 3 (three) times daily. TAKE 2 TABLETS BY MOUTH THREE TIMES A DAY WITH MEALS AND TAKE 1 TABLET BY MOUTH TWO TIMES A DAY WITH SNACKS    fluconazole (DIFLUCAN) 100 MG tablet Take 100 mg by mouth once daily.    levocarnitine (CARNITOR) 330 mg Tab Take 330 mg by mouth once daily.    levothyroxine (SYNTHROID) 50 MCG tablet 125 mcg. 1 tablet (50mg) by mouth every morning before breakfast. Excpet on Tuesdays take 2 tablets (100mg) before breakfast.    lidocaine-prilocaine (EMLA) cream APPLY TO AREA ONE TO TWO hours prior TO dialysis treatment    metoprolol succinate (TOPROL-XL) 50 MG 24 hr tablet Take 50 mg by mouth once daily.    midodrine (PROAMATINE) 5 MG Tab TAKE ONE TABLET BY MOUTH THREE TIMES DAILY TO HELP RAISE BLOOD PRESSURE    nitroGLYCERIN (NITROSTAT) 0.4 MG SL tablet Place 0.4 mg under the tongue every 5 (five) minutes as needed for Chest pain.    oxycodone (OXYCONTIN) 30 mg TR12 12 hr tablet Take 30 mg by mouth every 12 (twelve) hours.    pantoprazole (PROTONIX) 40 MG tablet Take 40 mg by mouth once daily.    pramipexole (MIRAPEX) 0.5 MG tablet Take 0.5 mg by mouth 3 (three) times daily.    prochlorperazine (COMPAZINE) 10 MG tablet Take 1 tablet (10 mg total) by mouth 3 (three) times daily as needed (with meals for nausea , 2 to 3  times a day prn). 1 Tablet Oral Before meals     Family History     Problem Relation (Age of Onset)    Alcohol abuse Brother    Cancer Brother    Heart disease Brother    Hypertension Mother    Kidney disease Father, Sister, Brother    Polycystic kidney disease Sister        Social History Main Topics    Smoking status: Current Some Day Smoker     Packs/day: 1.50     Types: Cigarettes    Smokeless tobacco: Never Used      Comment: Smokes one to two packs/day    Alcohol use No    Drug use: No    Sexual activity: Yes     Partners: Male     Review of Systems   Constitution: Positive for weakness. Negative for  diaphoresis and malaise/fatigue.   HENT: Negative for hoarse voice and nosebleeds.    Eyes: Negative for vision loss in left eye and vision loss in right eye.   Cardiovascular: Positive for chest pain, dyspnea on exertion and leg swelling.        See H&P, currently chest pain free   Respiratory: Positive for shortness of breath and sleep disturbances due to breathing. Negative for cough and hemoptysis.    Endocrine: Negative for cold intolerance and heat intolerance.   Hematologic/Lymphatic: Positive for bleeding problem. Bruises/bleeds easily.   Gastrointestinal: Negative for bloating.   Neurological: Negative for aphonia.     Objective:     Vital Signs (Most Recent):  Temp: 98.3 °F (36.8 °C) (01/14/18 1115)  Pulse: 82 (01/14/18 1200)  Resp: (!) 38 (01/14/18 1200)  BP: 122/60 (01/14/18 1200)  SpO2: (!) 87 % (01/14/18 1200) Vital Signs (24h Range):  Temp:  [98 °F (36.7 °C)-99 °F (37.2 °C)] 98.3 °F (36.8 °C)  Pulse:  [77-97] 82  Resp:  [10-38] 38  SpO2:  [74 %-100 %] 87 %  BP: ()/(41-69) 122/60     Weight: 49 kg (108 lb 0.4 oz)  Body mass index is 21.1 kg/m².    SpO2: (!) 87 %  O2 Device (Oxygen Therapy): nasal cannula w/ humidification      Intake/Output Summary (Last 24 hours) at 01/14/18 1238  Last data filed at 01/14/18 1200   Gross per 24 hour   Intake              360 ml   Output                0 ml   Net              360 ml       Lines/Drains/Airways     Drain                 Hemodialysis AV Graft Left upper arm -- days         Hemodialysis AV Fistula 01/14/18 0849 Left upper arm less than 1 day          Peripheral Intravenous Line                 Peripheral IV - Single Lumen 01/13/18 1841 Right Upper Arm less than 1 day         Peripheral IV - Single Lumen 01/14/18 0041 Right Antecubital less than 1 day                Physical Exam   Constitutional: She is oriented to person, place, and time. She appears well-developed and well-nourished.   HENT:   Head: Normocephalic and atraumatic.   Eyes:  Conjunctivae are normal. Pupils are equal, round, and reactive to light.   Neck: Normal range of motion. Neck supple. JVD present.   Cardiovascular: Normal rate and regular rhythm.    No murmur heard.  Pulmonary/Chest: Effort normal. She has no wheezes. She has rales. She exhibits tenderness.   Abdominal: Soft. Bowel sounds are normal. She exhibits no distension.   Musculoskeletal: She exhibits edema.   Neurological: She is alert and oriented to person, place, and time.   Skin: Skin is warm and dry.   Psychiatric: She has a normal mood and affect. Her behavior is normal.       Significant Labs:   CBC   Recent Labs  Lab 01/13/18  1900 01/14/18  0411 01/14/18  1134   WBC 8.30 9.38 10.45   HGB 7.6* 7.8* 7.4*   HCT 24.4* 24.2* 23.3*   * 98* 106*

## 2018-01-14 NOTE — CONSULTS
[facility]  Interventional Cardiology  Consult Note    Patient Name: Althea Hsieh  MRN: [id number]  Admission Date: 1/14/2018  Hospital Length of Stay: 0 days  Code Status: Full Code   Attending Provider: Ainsley More MD  Consulting Provider: Allison Keating MD  Primary Care Physician: Davis Syed MD  Principal Problem:Unstable angina    Patient information was obtained from patient and ER records.     Consults  Subjective:     Chief Complaint:  NSTEMI/chest pain       HPI:  Ms. Hsieh is a 62 y.o. Lady with PMH sx for:    coronary artery disease s/p CABG 6/2016 - remote PCI 1990s,     ESRD on HD at home (no HD in 3 days),      COPD, tobacco abuse,     GIB PUD - ulcer many year ago,     PVD s/p left Fem-pop due to non-healing ulcer on left leg from venous harvest    carotid artery aneurysm  Who was transferred for chest pain and NSTEMI.  As per patient she has been having exertional angina for the past two months (doing laundry, and folding towels causes it).  On Friday she slipped and fell in the house (at baseline walks with walker outside of home and does not use walker inside).  She slept alright overnight, but her  was concerned on Saturday that she was still bleeding so he told her to get dressed to go to the hospital.  Apon getting dress, that took everything out of her, causing her to have angina, while in car with  she stated she wished she had oxygen because that is what resolves her pain.  When she got to ER and they put her on oxygen her pain resolved and she hasnt had pain since.    Echo done: prelim fellows read EF 40% with WMA in apical septum and basilar inferoseptum           Past Medical History:   Diagnosis Date    Aneurysm of cavernous portion of left internal carotid artery     Anticoagulant long-term use     Anxiety associated with depression     CHF (congestive heart failure)     COPD (chronic obstructive pulmonary disease)     Encounter for  blood transfusion     ESRD (end stage renal disease) on dialysis     Hyperparathyroidism, secondary renal     Hypertension     Myocardial infarct     age [age] years     PLD (polycystic liver disease)     Polycystic kidney disease, autosomal dominant     Thyroid disease        Past Surgical History:   Procedure Laterality Date    CARDIAC SURGERY       SECTION, LOW TRANSVERSE  ,    CORONARY ANGIOPLASTY WITH STENT PLACEMENT      Has 2  stents    CORONARY ARTERY BYPASS GRAFT      DIALYSIS FISTULA CREATION      FEMORAL BYPASS      HYSTERECTOMY         Review of patient's allergies indicates:   Allergen Reactions    Sulfamethoxazole-trimethoprim Other (See Comments)     Pt is unsure if she is actually allergic to the medicine.        PTA Medications   Medication Sig    ALBUTEROL SULFATE (PROAIR HFA INHL) Inhale into the lungs.    ALPRAZolam (XANAX) 1 MG tablet Take 1 mg by mouth 3 (three) times daily as needed for Insomnia or Anxiety.    amiodarone (PACERONE) 200 MG Tab TAKE ONE TABLET BY MOUTH DAILY FOR HEART rhythm    ammonium lactate (LAC-HYDRIN) 12 % lotion Apply topically 2 (two) times daily. AAA LE    aspirin (ECOTRIN) 81 MG EC tablet Take 1 tablet (81 mg total) by mouth once daily.    atorvastatin (LIPITOR) 80 MG tablet TAKE ONE TABLET BY MOUTH DAILY after supper FOR CHOLESTEROL    calcitRIOL (ROCALTROL) 0.25 MCG Cap Take 0.25 mcg by mouth once daily.    cilostazol (PLETAL) 100 MG Tab TAKE ONE TABLET BY MOUTH TWICE DAILY FOR -leg circulation    clopidogrel (PLAVIX) 75 mg tablet Take 75 mg by mouth once daily.    escitalopram oxalate (LEXAPRO) 20 MG tablet Take 20 mg by mouth once daily.    ferric citrate (AURYXIA) 210 mg iron Tab Take 420 mg by mouth 3 (three) times daily. TAKE 2 TABLETS BY MOUTH THREE TIMES A DAY WITH MEALS AND TAKE 1 TABLET BY MOUTH TWO TIMES A DAY WITH SNACKS    fluconazole (DIFLUCAN) 100 MG tablet Take 100 mg by mouth once daily.    levocarnitine  (CARNITOR) 330 mg Tab Take 330 mg by mouth once daily.    levothyroxine (SYNTHROID) 50 MCG tablet 125 mcg. 1 tablet (50mg) by mouth every morning before breakfast. Excpet on Tuesdays take 2 tablets (100mg) before breakfast.    lidocaine-prilocaine (EMLA) cream APPLY TO AREA ONE TO TWO hours prior TO dialysis treatment    metoprolol succinate (TOPROL-XL) 50 MG 24 hr tablet Take 50 mg by mouth once daily.    midodrine (PROAMATINE) 5 MG Tab TAKE ONE TABLET BY MOUTH THREE TIMES DAILY TO HELP RAISE BLOOD PRESSURE    nitroGLYCERIN (NITROSTAT) 0.4 MG SL tablet Place 0.4 mg under the tongue every 5 (five) minutes as needed for Chest pain.    oxycodone (OXYCONTIN) 30 mg TR12 12 hr tablet Take 30 mg by mouth every 12 (twelve) hours.    pantoprazole (PROTONIX) 40 MG tablet Take 40 mg by mouth once daily.    pramipexole (MIRAPEX) 0.5 MG tablet Take 0.5 mg by mouth 3 (three) times daily.    prochlorperazine (COMPAZINE) 10 MG tablet Take 1 tablet (10 mg total) by mouth 3 (three) times daily as needed (with meals for nausea , 2 to 3  times a day prn). 1 Tablet Oral Before meals     Family History     Problem Relation (Age of Onset)    Alcohol abuse Brother    Cancer Brother    Heart disease Brother    Hypertension Mother    Kidney disease Father, Sister, Brother    Polycystic kidney disease Sister        Social History Main Topics    Smoking status: Current Some Day Smoker     Packs/day: 1.50     Types: Cigarettes    Smokeless tobacco: Never Used      Comment: Smokes one to two packs/day    Alcohol use No    Drug use: No    Sexual activity: Yes     Partners: Male     Review of Systems   Constitution: Positive for weakness. Negative for diaphoresis and malaise/fatigue.   HENT: Negative for hoarse voice and nosebleeds.    Eyes: Negative for vision loss in left eye and vision loss in right eye.   Cardiovascular: Positive for chest pain, dyspnea on exertion and leg swelling.        See H&P, currently chest pain free    Respiratory: Positive for shortness of breath and sleep disturbances due to breathing. Negative for cough and hemoptysis.    Endocrine: Negative for cold intolerance and heat intolerance.   Hematologic/Lymphatic: Positive for bleeding problem. Bruises/bleeds easily.   Gastrointestinal: Negative for bloating.   Neurological: Negative for aphonia.     Objective:     Vital Signs (Most Recent):  Temp: 98.3 °F (36.8 °C) (01/14/18 1115)  Pulse: 82 (01/14/18 1200)  Resp: (!) 38 (01/14/18 1200)  BP: 122/60 (01/14/18 1200)  SpO2: (!) 87 % (01/14/18 1200) Vital Signs (24h Range):  Temp:  [98 °F (36.7 °C)-99 °F (37.2 °C)] 98.3 °F (36.8 °C)  Pulse:  [77-97] 82  Resp:  [10-38] 38  SpO2:  [74 %-100 %] 87 %  BP: ()/(41-69) 122/60     Weight: 49 kg (108 lb 0.4 oz)  Body mass index is 21.1 kg/m².    SpO2: (!) 87 %  O2 Device (Oxygen Therapy): nasal cannula w/ humidification      Intake/Output Summary (Last 24 hours) at 01/14/18 1238  Last data filed at 01/14/18 1200   Gross per 24 hour   Intake              360 ml   Output                0 ml   Net              360 ml       Lines/Drains/Airways     Drain                 Hemodialysis AV Graft Left upper arm -- days         Hemodialysis AV Fistula 01/14/18 0849 Left upper arm less than 1 day          Peripheral Intravenous Line                 Peripheral IV - Single Lumen 01/13/18 1841 Right Upper Arm less than 1 day         Peripheral IV - Single Lumen 01/14/18 0041 Right Antecubital less than 1 day                Physical Exam   Constitutional: She is oriented to person, place, and time. She appears well-developed and well-nourished.   HENT:   Head: Normocephalic and atraumatic.   Eyes: Conjunctivae are normal. Pupils are equal, round, and reactive to light.   Neck: Normal range of motion. Neck supple. JVD present.   Cardiovascular: Normal rate and regular rhythm.    No murmur heard.  Pulmonary/Chest: Effort normal. She has no wheezes. She has rales. She exhibits tenderness.    Abdominal: Soft. Bowel sounds are normal. She exhibits no distension.   Musculoskeletal: She exhibits edema.   Neurological: She is alert and oriented to person, place, and time.   Skin: Skin is warm and dry.   Psychiatric: She has a normal mood and affect. Her behavior is normal.       Significant Labs:   CBC   Recent Labs  Lab 01/13/18  1900 01/14/18  0411 01/14/18  1134   WBC 8.30 9.38 10.45   HGB 7.6* 7.8* 7.4*   HCT 24.4* 24.2* 23.3*   * 98* 106*           Assessment and Plan:     ACS (acute coronary syndrome)    Patient with abnormal EKG and elevated troponin now 25.  Patient currently chest pain free.  Patient has been having exertional angina over past 2 months with minimal activity, believe blood loss and volume overload caused most recent event.  Troponins still uptrending, recommend conservative management, although patient will need angiogram with likely intervention this admission, or sooner if she becomes hemodynamically unstable.   Recommend transfuse to Hgb >10, emergent dialysis, DAPT.          Hypoxia    Pt needs urgent dialysis        ESRD on dialysis    Patient is very volume overloaded - needs urgent dialysis -CRRT line to be put in by primary team shortly            VTE Risk Mitigation         Ordered     Low Risk of VTE  Once      01/14/18 0320          Thank you for your consult. I will follow-up with patient. Please contact us if you have any additional questions.  Discussed with Dr Maxwell.      Allison Keating MD  Interventional Cardiology   [facility]

## 2018-01-14 NOTE — ED PROVIDER NOTES
Encounter Date: 2018       History     Chief Complaint   Patient presents with    transfer     pt arrived by [facility] from further evaluation for a possible stemi. pt had EKG changes with a elevated troponin. pt fell on her dialysis machine and has two fractured ribs. pt has not had dialysis since 3 days ago pt arrived receiving a unit od pRBC     HPI   61 yo F with PMH of CAD s/p CABG (), PCI (), ESRD on HD at home, ADPKD, COPD who presents by EMS as a transfer from [facility] for possible STEMI. She initially present to [facility] with chest heaviness and shortness of breath. She had also fallen at home 2 days prior. Pt states she had not had dialysis in 3 days and was requiring her PRN O2 continuously at home. During her evaluation at [facility], she was found to have acute fractures of left 10th and 11 th ribs and previous rib fractures on the right, skin tears to bilaterally forearms, and anemia with a drop in Hgb from 9 to 7 in a week. Pt EKG also showed inferior lateral ST depression. Wound were dressed, pt transfused 1 unit of pRBC and transferred.     Currently asymptomatic on 4 L Nc.     Review of patient's allergies indicates:   Allergen Reactions    Sulfamethoxazole-trimethoprim Other (See Comments)     Pt is unsure if she is actually allergic to the medicine.      Past Medical History:   Diagnosis Date    Aneurysm of cavernous portion of left internal carotid artery     Anxiety associated with depression     COPD (chronic obstructive pulmonary disease)     ESRD (end stage renal disease) on dialysis     Hyperparathyroidism, secondary renal     Hypertension     Myocardial infarct     age [age] years     PLD (polycystic liver disease)     Polycystic kidney disease, autosomal dominant     Thyroid disease      Past Surgical History:   Procedure Laterality Date     SECTION, LOW TRANSVERSE  ,    CORONARY ANGIOPLASTY WITH STENT PLACEMENT      Has 2  stents    DIALYSIS  FISTULA CREATION      HYSTERECTOMY  1992     Family History   Problem Relation Age of Onset    Hypertension Mother     Kidney disease Father     Kidney disease Sister     Kidney disease Brother     Alcohol abuse Brother     Cancer Brother     Heart disease Brother     Polycystic kidney disease Sister      Stroke     Social History   Substance Use Topics    Smoking status: Current Some Day Smoker     Packs/day: 1.00    Smokeless tobacco: Never Used      Comment: Smokes one to two packs/day    Alcohol use No     Review of Systems   Constitutional: Negative for chills and fever.   HENT: Negative for congestion.    Respiratory: Positive for shortness of breath. Negative for chest tightness.    Cardiovascular: Negative for chest pain.   Gastrointestinal: Negative for abdominal pain, nausea and vomiting.   Genitourinary: Negative for dysuria.   Musculoskeletal: Negative for back pain and neck pain.   Skin: Positive for wound.   Neurological: Negative for syncope and headaches.   Psychiatric/Behavioral: Negative for confusion.       Physical Exam     Initial Vitals [01/14/18 0155]   BP Pulse Resp Temp SpO2   (!) 109/45 92 18 99 °F (37.2 °C) 100 %      MAP       66.33         Physical Exam    Nursing note and vitals reviewed.  Constitutional: She is not diaphoretic. She has a sickly appearance. No distress. Nasal cannula in place.   HENT:   Head: Normocephalic and atraumatic.   Eyes: EOM are normal. Pupils are equal, round, and reactive to light. No scleral icterus.   Neck: Normal range of motion. Neck supple. JVD present.   Cardiovascular: Normal rate, regular rhythm, normal heart sounds and intact distal pulses. Exam reveals no gallop and no friction rub.    No murmur heard.  Pulmonary/Chest: No respiratory distress. She has no wheezes. She has no rhonchi. She has no rales. She exhibits no tenderness.   Abdominal: Soft. Bowel sounds are normal. She exhibits distension. She exhibits no mass. There is no  tenderness. There is no rebound and no guarding.   Musculoskeletal: Normal range of motion. She exhibits no edema or tenderness.   Neurological: She is alert and oriented to person, place, and time. She has normal strength.   Skin: Skin is warm and dry. Capillary refill takes 2 to 3 seconds. No rash and no abscess noted. No erythema. There is pallor.         ED Course   Procedures  Labs Reviewed - No data to display                APC / Resident Notes:   HO-II MDM  This is an emergent evaluation of a 62 y.o. F with PMH of CAD s/p CABG (2016), PCI (1990's), ESRD on HD at home, ADPKD, COPD who presents by EMS as a transfer from [facility] for possible STEMI. Physical exam as mentioned above. Cardiology aware of pt and at bedside. Will admit to CCU for unstable angina. Pt is aware of plan and is amenable.     Amanda Cesar D.O  [employer] EMERGENCY MEDICINE PGY-2  2:16 AM 01/14/2018                ED Course      Clinical Impression:   The primary encounter diagnosis was ACS (acute coronary syndrome). Diagnoses of Unstable angina, Chest pain, NSTEMI (non-ST elevated myocardial infarction), and Chronic kidney disease-mineral and bone disorder were also pertinent to this visit.                           Amanda Cesar, DO  Resident  01/15/18 9513

## 2018-01-14 NOTE — ED NOTES
Psychosocial: Patient is calm and cooperative. Patients insight and judgement are appropriate to situation. Appears clean, well maintained, with clothing appropriate to environment. No evidence of delusions, hallucinations, or psychosis.    Neuro: Eyes open spontaneously. Awake, alert, oriented x 4. Speech clear and appropriate. Tolerating saliva secretions well. Able to follow commands, demonstrating ability to actively and appropriately communicate within context of current conversation. Symmetrical facial muscles. Moving all extremities well with no noted weakness. Adequate muscle tone present. Movement is purposeful. No evidence of impaired sensation. Responds to external stimuli with appropriate reflexes.     Circulatory: Skin warm, dry, and pink. Apical and radial pulses strong and regular. Capillary refill/skin blanching less than 3 seconds to distal of 4 extremities. Placed on CM in NSR without ectopy.    Abdomen: Abdomen obese, soft and non-distended. Positive normo-active bowel sounds x 4 quadrants.     Urinary: Patient reports routine urination without pain, frequency, or urgency. Voids independently. Reports urine appears tamra/yellow in color..     Skin/Extremity:multiple skin abrasions noted to left arm. Dressed wrapped with guaze and bleeding control. multiple bruises noted to left side of back. 7-10 cm wxl

## 2018-01-14 NOTE — ASSESSMENT & PLAN NOTE
Patient with abnormal EKG and elevated troponin now 25.  Patient currently chest pain free.  Patient has been having exertional angina over past 2 months with minimal activity, believe blood loss and volume overload caused most recent event.  Troponins still uptrending, recommend conservative management, although patient will need angiogram with likely intervention this admission or if she becomes HD unstable.   Recommend transfuse to Hgb >10, emergent dialysis, DAPT.

## 2018-01-14 NOTE — HPI
Chief Complaint:  Chest pressure.       HPI:  Ms. Hsieh is a 62 y.o. Lady with PMH sx for:    coronary artery disease s/p CABG 6/2016 - remote PCI 1990s,     ESRD on HD at home (no HD in 3 days),      COPD, tobacco abuse,     GIB PUD - ulcer many year ago,     PVD s/p left Fem-pop due to non-healing ulcer on left leg from venous harvest    carotid artery aneurysm  Who was transferred for chest pain and NSTEMI.  As per patient she has been having exertional angina for the past two months (doing laundry, and folding towels causes it).  On Friday she slipped and fell in the house (at baseline walks with walker outside of home and does not use walker inside).  She slept alright overnight, but her  was concerned on Saturday that she was still bleeding so he told her to get dressed to go to the hospital.  Apon getting dress, that took everything out of her, causing her to have angina, while in car with  she stated she wished she had oxygen because that is what resolves her pain.  When she got to ER and they put her on oxygen her pain resolved and she hasnt had pain since.    Echo done: prelim fellows read EF 40% with WMA in apical septum and basilar inferoseptum

## 2018-01-14 NOTE — ASSESSMENT & PLAN NOTE
Patient is very volume overloaded - needs urgent dialysis -CRRT line to be put in by primary team shortly

## 2018-01-15 PROBLEM — J18.9 COMMUNITY ACQUIRED PNEUMONIA: Status: ACTIVE | Noted: 2018-01-01

## 2018-01-15 PROBLEM — S51.819A SKIN TEAR OF FOREARM WITHOUT COMPLICATION: Status: ACTIVE | Noted: 2018-01-01

## 2018-01-15 NOTE — PLAN OF CARE
01/15/18 1457   Discharge Assessment   Assessment Type Discharge Planning Assessment   Confirmed/corrected address and phone number on facesheet? Yes   Assessment information obtained from? Medical Record   Expected Length of Stay (days) 5   Communicated expected length of stay with patient/caregiver yes   Prior to hospitilization cognitive status: Alert/Oriented   Prior to hospitalization functional status: Assistive Equipment;Independent   Current cognitive status: Alert/Oriented   Current Functional Status: Needs Assistance;Assistive Equipment   Facility Arrived From: [facility]   Lives With spouse   Able to Return to Prior Arrangements yes   Is patient able to care for self after discharge? Yes   Who are your caregiver(s) and their phone number(s)? Matthew Hsieh [phone]   Patient's perception of discharge disposition home or selfcare   Readmission Within The Last 30 Days previous discharge plan unsuccessful   If yes, most recent facility name: [facility]   Patient currently being followed by outpatient case management? No   Patient currently receives any other outside agency services? No   Equipment Currently Used at Home walker, rolling   Do you have any problems affording any of your prescribed medications? No   Is the patient taking medications as prescribed? yes   Does the patient have transportation home? Yes   Transportation Available car;family or friend will provide   D[facility] [place] and [place]uled [place] [facility] /week Sun, M, T, TH, F Nephrologist Dr. Prasanna Love   Does the patient receive services at the [facility]? No   Discharge Plan A Home with family;Home Health   Discharge Plan B Home with family   Patient/Family In Agreement With Plan yes   Readmission Questionnaire   Does the patient have transportation to healthcare appointments? Yes         PAST MEDICAL HISTORY: Significant for ESRD secondary to ADPKD for greater than six years, CAD  status post stents, COPD, PKD, PLD, hypertension, cavernous sinusaneurysm with the last MRI stable per the patient.    PCP: Dr. Abelardo San    The patient has has multiple admission within 1 year. She lives with her spouse and has transportation. She has received home health in the past thorough [facility]. Due to multiple admission and debility the patient may benefit for a PT/OT eval to help with discharge planning. She is on home HD 5 days/week. Dr. Love is her nephrologist. Pt may be ready for transfer to step down today or tomorrow pending medical stability. No other needs assessed at this time.

## 2018-01-15 NOTE — ASSESSMENT & PLAN NOTE
Long hx of CAD with PCI in 1977 and 1980 and CABG x 3 in 6/2016. Still smokes. Has had CP for 3-4 months. No nitro as has not been that bad. Had recent fall with bleeding and drop in hemoglobin to 7 from 9 with increasing chest pain. ST changes in V1 improving now isolated to one lead and had some changes in this lead prior on EKG from 11/2017. She also currently has NO chest heaviness or symptoms of angina - thus this does not appear to be a STEMI. ST depressions in inferior and lateral leads improving. This is most likely demand ischemia in the setting of underlying severe CAD, new anemia, COPD/hypoxia.    - Echo shows EF 30-35% with severe left atrial enlargement. The inferior wall and apex are akinetic with global hypokinesis  - Holding off on heparin gtt for now as she is bleeding  - Continue ASA/plavix  - Received 3 units pRBC since yesterday. Hgb responded appropriately to 10.1  - Troponins continued to rise (17->25->38) but are now flat at 38. Will repeat this afternoon  - Interventional cardiology consulted. Will likely need LHC during this admission. For now, will treat conservatively.     Dry/Warm

## 2018-01-15 NOTE — PLAN OF CARE
Problem: Patient Care Overview  Goal: Plan of Care Review  No acute events throughout night, remains on Levophed. CRRT in progress. 2 units PRBC infused (total of 4 units). Possible heart cath todayVS and assessment per flow sheet, patient progressing towards goals as tolerated, plan of care reviewed with Althea Hsieh and family, all concerns addressed, will continue to monitor.

## 2018-01-15 NOTE — NURSING
Dr Najera @  - heart cath today. Pt instructed NPO. Pt informed spouse via phone. Dr Plascencia notified H/H 30/10, Plts 89 and INR 1.2.

## 2018-01-15 NOTE — PROGRESS NOTES
[facility]  Cardiology  Progress Note    Patient Name: Althea Hsieh  MRN: [id number]  Admission Date: 1/14/2018  Hospital Length of Stay: 1 days  Code Status: Full Code   Attending Physician: Ainsley More MD   Primary Care Physician: Davis Syed MD  Expected Discharge Date: 1/17/2018  Principal Problem:Unstable angina    Subjective:     Hospital Course:   1/14: Admitted to CCU for ACS, volume overload, and blood loss anemia. Nephrology consulted for SLED  1/15: SLED overnight. Required low dose levophed during dialysis. Troponins continued to rise (17>25>38) but flattened at 38. Interventional cardiology consulted for C. Patient remained chest pain free. Still requiring 8 L of O2. Started on antibiotics for LLL pneumonia seen on CTA.    Interval History: No acute events overnight. Patient received 1 unit pRBC yesterday and 2 units early this morning. H/H 10 and 30 this AM. SLED started overnight and continues. Requiring 0.06 of levophed for pressure support during dialysis. Patient denies CP, SOB at rest, nausea, or vomiting. Endorses some abdominal tightness but it appears to be close to baseline. Patient remains anuric. Currently satting 97% on 8 L.    Review of Systems   Constitution: Positive for weakness. Negative for chills and fever.   HENT: Negative for sore throat.    Eyes: Negative for blurred vision.   Cardiovascular: Negative for chest pain, dyspnea on exertion and palpitations.   Respiratory: Negative for cough, shortness of breath and wheezing.    Musculoskeletal: Positive for back pain and joint pain.   Gastrointestinal: Positive for bloating and abdominal pain. Negative for constipation, diarrhea, nausea and vomiting.   Neurological: Negative for dizziness, headaches and light-headedness.     Objective:     Vital Signs (Most Recent):  Temp: 99.3 °F (37.4 °C) (01/15/18 1500)  Pulse: 93 (01/15/18 1500)  Resp: (!) 22 (01/15/18 1500)  BP: (!) 127/56 (01/15/18  1500)  SpO2: (!) 92 % (01/15/18 1500) Vital Signs (24h Range):  Temp:  [98 °F (36.7 °C)-99.3 °F (37.4 °C)] 99.3 °F (37.4 °C)  Pulse:  [75-93] 93  Resp:  [14-43] 22  SpO2:  [92 %-98 %] 92 %  BP: ()/(36-56) 127/56     Weight: 49 kg (108 lb 0.4 oz)  Body mass index is 21.1 kg/m².     SpO2: (!) 92 %  O2 Device (Oxygen Therapy): High Flow nasal Cannula      Intake/Output Summary (Last 24 hours) at 01/15/18 1612  Last data filed at 01/15/18 1500   Gross per 24 hour   Intake          5806.29 ml   Output             5542 ml   Net           264.29 ml       Lines/Drains/Airways     Central Venous Catheter Line                 Trialysis (Dialysis) Catheter 01/14/18 1346 right internal jugular 1 day          Drain                 Hemodialysis AV Graft Left upper arm -- days         Hemodialysis AV Fistula 01/14/18 0849 Left upper arm 1 day          Peripheral Intravenous Line                 Peripheral IV - Single Lumen 01/13/18 1841 Right Upper Arm 1 day         Peripheral IV - Single Lumen 01/14/18 0041 Right Antecubital 1 day                Physical Exam   Constitutional: She is oriented to person, place, and time. She appears well-developed and well-nourished.   HENT:   Head: Normocephalic and atraumatic.   Neck: Normal range of motion. Neck supple. JVD (to jaw) present.   Cardiovascular: Normal rate, regular rhythm and normal heart sounds.  Exam reveals no gallop and no friction rub.    No murmur heard.  Pulmonary/Chest: Effort normal and breath sounds normal.   Difficulty with taking a deep breath due to rib fractures.    Abdominal: Soft. Bowel sounds are normal. She exhibits distension.   Musculoskeletal: Normal range of motion. She exhibits no edema.   Neurological: She is alert and oriented to person, place, and time.   Skin: Skin is warm and dry.       Significant Labs: All pertinent lab results from the last 24 hours have been reviewed.      Assessment and Plan:     Brief HPI: 63 yo female with CAD s/p CABG  6/2016, remote PCI 1990s, ESRD on HD at home (no HD in 3 days), ADPKD, COPD, tobacco abuse, GIB PUD - ulcer many year ago, PVD s/p left Fem-pop due to non-healing ulcer on left leg from venous harvest, carotid artery aneurysm who was admitted for ACS, blood loss anemia, and volume overload    * Unstable angina    Long hx of CAD with PCI in 1977 and 1980 and CABG x 3 in 6/2016. Still smokes. Has had CP for 3-4 months. No nitro as has not been that bad. Had recent fall with bleeding and drop in hemoglobin to 7 from 9 with increasing chest pain. ST changes in V1 improving now isolated to one lead and had some changes in this lead prior on EKG from 11/2017. She also currently has NO chest heaviness or symptoms of angina - thus this does not appear to be a STEMI. ST depressions in inferior and lateral leads improving. This is most likely demand ischemia in the setting of underlying severe CAD, new anemia, COPD/hypoxia.    - Echo shows EF 30-35% with severe left atrial enlargement. The inferior wall and apex are akinetic with global hypokinesis  - Holding off on heparin gtt for now as she is bleeding  - Continue ASA/plavix  - Received 3 units pRBC since yesterday. Hgb responded appropriately to 10.1  - Troponins continued to rise (17->25->38) but are now flat at 38. Will repeat this afternoon  - Interventional cardiology consulted. Will likely need LHC during this admission. For now, will treat conservatively.          Community acquired pneumonia    - CTA chest showed abnormal acute airspace disease left lower lobe suspicious for pneumonia  - Though patient is afebrile and without productive cough, still requiring up to 8 L O2  - Start po azithromycin and IV ceftriaxone x 5 days         Skin tear of forearm without complication    Wound care consulted        ACS (acute coronary syndrome)    See unstable angina         Thrombocytopenia    - likely due to chronic liver disease (polycystic liver?)  - continue to follow         PAD S/P femoral-popliteal bypass surgery    - No current pain or non healing ulcers at present.  - Continue ASA/Plavix  - Hold cilostazol in the setting of heart failure exacerbation         Hypoxia    - likely multifactorial including acute blood loss anemia, pulmonary edema (volume overload), and developing pneumonia         HTN (hypertension)    - Holding anti-hypertensives as BP low during dialysis        Tobacco abuse    Smoking cessation discussed         ESRD on dialysis    - On MWF dialysis, last was on F. Appears volume overloaded on exam.   - Nephrology consulted. SLED started overnight. Unable to pull volume due to risk of cardiac demise with rapid volume shifts  - Requiring low dose levophed during SLED  - Continue midodrine 5 mg TID        CAD (coronary artery disease)    Hx of PCI and CABG x 3. Will continue home medications.             VTE Risk Mitigation         Ordered     Low Risk of VTE  Once      01/14/18 0320          Chevy Johnston MD  Cardiology  [facility]

## 2018-01-15 NOTE — SUBJECTIVE & OBJECTIVE
Interval History: No acute events overnight. Patient received 1 unit pRBC yesterday and 2 units early this morning. H/H 10 and 30 this AM. SLED started overnight and continues. Requiring 0.06 of levophed for pressure support during dialysis. Patient denies CP, SOB at rest, nausea, or vomiting. Endorses some abdominal tightness but it appears to be close to baseline. Patient remains anuric. Currently satting 97% on 8 L.    Review of Systems   Constitution: Positive for weakness. Negative for chills and fever.   HENT: Negative for sore throat.    Eyes: Negative for blurred vision.   Cardiovascular: Negative for chest pain, dyspnea on exertion and palpitations.   Respiratory: Negative for cough, shortness of breath and wheezing.    Musculoskeletal: Positive for back pain and joint pain.   Gastrointestinal: Positive for bloating and abdominal pain. Negative for constipation, diarrhea, nausea and vomiting.   Neurological: Negative for dizziness, headaches and light-headedness.     Objective:     Vital Signs (Most Recent):  Temp: 99.3 °F (37.4 °C) (01/15/18 1500)  Pulse: 93 (01/15/18 1500)  Resp: (!) 22 (01/15/18 1500)  BP: (!) 127/56 (01/15/18 1500)  SpO2: (!) 92 % (01/15/18 1500) Vital Signs (24h Range):  Temp:  [98 °F (36.7 °C)-99.3 °F (37.4 °C)] 99.3 °F (37.4 °C)  Pulse:  [75-93] 93  Resp:  [14-43] 22  SpO2:  [92 %-98 %] 92 %  BP: ()/(36-56) 127/56     Weight: 49 kg (108 lb 0.4 oz)  Body mass index is 21.1 kg/m².     SpO2: (!) 92 %  O2 Device (Oxygen Therapy): High Flow nasal Cannula      Intake/Output Summary (Last 24 hours) at 01/15/18 1612  Last data filed at 01/15/18 1500   Gross per 24 hour   Intake          5806.29 ml   Output             5542 ml   Net           264.29 ml       Lines/Drains/Airways     Central Venous Catheter Line                 Trialysis (Dialysis) Catheter 01/14/18 1346 right internal jugular 1 day          Drain                 Hemodialysis AV Graft Left upper arm -- days          Hemodialysis AV Fistula 01/14/18 0849 Left upper arm 1 day          Peripheral Intravenous Line                 Peripheral IV - Single Lumen 01/13/18 1841 Right Upper Arm 1 day         Peripheral IV - Single Lumen 01/14/18 0041 Right Antecubital 1 day                Physical Exam   Constitutional: She is oriented to person, place, and time. She appears well-developed and well-nourished.   HENT:   Head: Normocephalic and atraumatic.   Neck: Normal range of motion. Neck supple. JVD (to jaw) present.   Cardiovascular: Normal rate, regular rhythm and normal heart sounds.  Exam reveals no gallop and no friction rub.    No murmur heard.  Pulmonary/Chest: Effort normal and breath sounds normal.   Difficulty with taking a deep breath due to rib fractures.    Abdominal: Soft. Bowel sounds are normal. She exhibits distension.   Musculoskeletal: Normal range of motion. She exhibits no edema.   Neurological: She is alert and oriented to person, place, and time.   Skin: Skin is warm and dry.       Significant Labs: All pertinent lab results from the last 24 hours have been reviewed.

## 2018-01-15 NOTE — HOSPITAL COURSE
1/14 - 1/15: Admitted to CCU for ACS, volume overload, and blood loss anemia. Nephrology consulted for SLED. Received SLED overnight. Required low dose levophed during dialysis. Troponins continued to rise (17>25>38) but flattened at 38. Interventional cardiology consulted for LHC. Patient remained chest pain free. Still requiring 8 L of O2. Started on antibiotics for LLL pneumonia seen on CTA.  1/15: No acute events overnight. Patient received 1 unit pRBC yesterday and 2 units early this morning. H/H 10 and 30 this AM. SLED started overnight and continues. Requiring 0.06 of levophed for pressure support during dialysis. Patient denies CP, SOB at rest, nausea, or vomiting. Endorses some abdominal tightness but it appears to be close to baseline. Patient remains anuric. Currently satting 97% on 8 L  1/16: no acute events overnight. SLED overnight, stopped this morning. No volume removed. Denies CP or SOB at rest, but having some dyspnea. Sitting in chair on exam. Still requiring up to 7 L O2. Endorses productive cough but denies fever/chills  1/17: SLED overnight with . Patient had increase in O2 requirements (up to 12 L). Occasionally requiring levophed during fluid removal. This morning, denies SOB at rest, CP, palpitations. Has some new leg pain in left anterior shin, noted to be erythematous and slightly warm.  1/18: No acute overnight events. Off Levophed since 2am.  1/19: Had some abd cramping and nausea yesterday. Received GI cocktail which resolved pain. Required levophed overnight up to 0.18. Patient denies fever/chills, SOB, CP, abd pain, nausea, vomiting.  1/20: Required levophed overnight up to 0.2. Downtitrating now, A-line shows wide pulse pressures, no previous AI noted on echo and no AI murmur on physical exam   1/21: No acute events overnight. Still requiring levophed 0.08. Now on home O2 regiment of 3 L. Denies CP or SOB. States that she would like to go home.  1/22: Off levophed since 5 am.  HD planned for today. Will keep in ICU to see if patient is able to tolerate HD  1/23 Patient tolerated HD today but ended 15 min early due. 1.8 L removed. Placed on low dose levophed after HD. No longer requiring supplemental O2. Levophed d/c'd and patient given two 250 cc NS boluses. Patient was watched for a few hours after levophed and was deemed safe for discharge home.

## 2018-01-15 NOTE — ASSESSMENT & PLAN NOTE
- CTA chest showed abnormal acute airspace disease left lower lobe suspicious for pneumonia  - Though patient is afebrile and without productive cough, still requiring up to 8 L O2  - Start po azithromycin and IV ceftriaxone x 5 days

## 2018-01-15 NOTE — PROGRESS NOTES
Wound care consulted for abrasions to bilateral forearms after fall    The partial thickness skin tears have controlled bleeding noted at present.  Loose edges placed over wound bed. The left forearm has greater amount of bleeding noted.  The skin was cleaned with normal saline, petroleum dressing placed over wound beds, super absorbent dressing, wrapped in kerlix and taped to secure.  Plan of care discussed with patient who verbalized understanding.  Unit nurse at bedside.      01/15/18 1050       Wound 01/14/18 0446 Skin tear lower arm   Date First Assessed/Time First Assessed: 01/14/18 0446   Wound Type: Skin tear  Side: Left  Orientation: lower  Location: arm  Wound Length (cm): 1  Wound Width (cm): 6   Wound Image    Wound WDL ex   Dressing Appearance intact;moist drainage   Drainage Amount large   Drainage Characteristics/Odor sanguineous   Wound Base maroon   Periwound Area ecchymotic   Wound Edges jagged;open   Wound Length (cm) 1   Wound Width (cm) 4   Depth (cm) 0.1   Non-staged Wound Description Partial thickness   Cleansed W/ sterile normal saline   Dressing Dressing changed;petroleum-based dressing;Manolo  (super absorbent dressing)   Dressing Change Due 01/17/18       Wound 01/14/18 0446 Skin tear lower arm   Date First Assessed/Time First Assessed: 01/14/18 0446   Pre-existing: Yes  Wound Type: Skin tear  Side: Right  Orientation: lower  Location: arm  Wound Length (cm): 3  Wound Width (cm): 3   Wound Image    Wound WDL ex   Dressing Appearance intact;moist drainage   Drainage Amount small   Drainage Characteristics/Odor serosanguineous   Wound Base reddened;moist   Periwound Area ecchymotic   Wound Edges open;jagged   Wound Length (cm) (2 areas   top= 2   bottom =1)   Wound Width (cm) (2 areas   top = 1   bottom = 1)   Depth (cm) 0.1   Non-staged Wound Description Partial thickness   Cleansed W/ sterile normal saline   Dressing Dressing changed;petroleum-based dressing;Manolo  (super absorbent  dressing)   Dressing Change Due 01/17/18

## 2018-01-15 NOTE — ASSESSMENT & PLAN NOTE
- likely multifactorial including acute blood loss anemia, pulmonary edema (volume overload), and developing pneumonia

## 2018-01-15 NOTE — PROGRESS NOTES
[facility]  Nephrology  Progress Note    Patient Name: Althea Hsieh  MRN: [id number]  Admission Date: 1/14/2018  Hospital Length of Stay: 1 days  Attending Provider: Ainsley More MD   Primary Care Physician: Davis Syed MD  Principal Problem:Unstable angina    Subjective:     HPI: Ms. Hsieh is a 61 yo WF with CAD s/p CABG 2016, PVD, COPD, ADPKD with renal and liver involvement, and ESRD on home HD who presented to an OSH on 1/14 with chest pressure, SOB, and new O2 requirement. She recently had a fall which resulted in L rib fractures and bleeding from both arms. She received 1 unit pRBC at OSH and was transferred to [facility] for further care. Upon further chart review she has been dealing with anemia of unknown source for some time. She receives [facility] outpatient. She has been sent to GI for colonoscopy but never went. She [facility] Each session is 2 hours 23 minutes. EDW 48kg. She no longer makes urine. She has low BP at baseline and takes midodrine with HD. She hasn't performed HD since Friday. She recent recent kidney cyst rupture which resulted in mild fever and hematuria; symptoms have since resolved. She currently denies any chest pressure. Troponin has trended from 0.15 to 3.67 and now 25.4. SBP 70-90s. She is developing worsening hypoxia on 5L NC. Nephrology consulted for urgent HD for volume overload. Primary team placing trialysis catheter now. Consent for dialysis obtained by patient and placed in chart.     Interval History:   BP dropped after line placement yesterday and levophed started prior to SLED. UF 250cc/hr overnight. Received 4 units pRBC yesterday. FiO2 requirements increased from 5L HFNC to 8L HFNC. Net +800cc/24h. Troponin continues to rise; up to 38.7 today. Patient tells me cardiology is planning for supportive care at this time. Denies any chest pain. Does report back pain 2/2 laying flat for CRRT; also reports  boredom.     Review of patient's allergies indicates:   Allergen Reactions    Sulfamethoxazole-trimethoprim Other (See Comments)     Pt is unsure if she is actually allergic to the medicine.      Current Facility-Administered Medications   Medication Frequency    0.9%  NaCl infusion (CRRT USE ONLY) PRN    0.9%  NaCl infusion (for blood administration) Q24H PRN    albuterol inhaler 2 puff Q6H PRN    ALPRAZolam tablet 1 mg TID PRN    amiodarone tablet 200 mg Daily    aspirin EC tablet 81 mg Daily    atorvastatin tablet 80 mg Daily    azithromycin tablet 500 mg Once    Followed by    [START ON 1/16/2018] azithromycin tablet 250 mg Daily    calcitRIOL capsule 0.25 mcg Daily    clopidogrel tablet 75 mg Daily    escitalopram oxalate tablet 20 mg Daily    fluconazole tablet 100 mg Daily    levOCARNitine capsule 250 mg Daily    levothyroxine tablet 50 mcg Before breakfast    magnesium sulfate 2g in water 50mL IVPB (premix) PRN    midodrine tablet 5 mg TID WM    nitroGLYCERIN SL tablet 0.4 mg Q5 Min PRN    norepinephrine 4 mg in dextrose 5% 250 mL infusion (premix) (titrating) Continuous    oxyCODONE 12 hr tablet 30 mg Q12H    pantoprazole EC tablet 40 mg Daily    pramipexole tablet 0.5 mg TID    prochlorperazine tablet 10 mg TID PRN    sodium chloride 0.9% flush 3 mL Q8H       Objective:     Vital Signs (Most Recent):  Temp: 98.2 °F (36.8 °C) (01/15/18 0701)  Pulse: 85 (01/15/18 1200)  Resp: (!) 24 (01/15/18 1200)  BP: (!) 93/54 (01/15/18 1200)  SpO2: 96 % (01/15/18 1200)  O2 Device (Oxygen Therapy): High Flow nasal Cannula (01/15/18 1200) Vital Signs (24h Range):  Temp:  [98.2 °F (36.8 °C)-99 °F (37.2 °C)] 98.2 °F (36.8 °C)  Pulse:  [75-92] 85  Resp:  [13-43] 24  SpO2:  [90 %-98 %] 96 %  BP: ()/(36-58) 93/54     Weight: 49 kg (108 lb 0.4 oz) (01/14/18 0405)  Body mass index is 21.1 kg/m².  Body surface area is 1.44 meters squared.    I/O last 3 completed shifts:  In: 4190.5 [P.O.:680;  I.V.:2989.5; Blood:521]  Out: 3216 [Other:3216]    Physical Exam   Constitutional: She is oriented to person, place, and time. She appears well-developed and well-nourished.   HENT:   Head: Normocephalic and atraumatic.   Eyes: Conjunctivae and EOM are normal.   Cardiovascular: Normal rate and regular rhythm.    Pulmonary/Chest: Effort normal. She has no wheezes. She has no rales.   Abdominal: Soft. She exhibits distension. There is no tenderness.   Musculoskeletal: She exhibits edema. She exhibits no deformity.   Neurological: She is alert and oriented to person, place, and time.   Skin: Skin is warm and dry. She is not diaphoretic.       Significant Labs:  ABGs: No results for input(s): PH, PCO2, HCO3, POCSATURATED, BE in the last 168 hours.  Cardiac Markers: No results for input(s): CKMB, TROPONINT, MYOGLOBIN in the last 168 hours.  CBC:   Recent Labs  Lab 01/15/18  0400   WBC 11.22  11.22   RBC 3.28*  3.28*   HGB 10.0*  10.0*   HCT 30.3*  30.3*   PLT 89*  89*   MCV 92  92   MCH 30.5  30.5   MCHC 33.0  33.0     CMP:   Recent Labs  Lab 01/15/18  0400     106  106   CALCIUM 8.6*  8.6*  8.6*   ALBUMIN 2.6*  2.6*   PROT 6.2     140  140   K 4.5  4.5  4.5   CO2 24  24  24     107  107   BUN 9  9  9   CREATININE 1.5*  1.5*  1.5*   ALKPHOS 92   ALT 19   *   BILITOT 1.2*     All labs within the past 24 hours have been reviewed.     Significant Imaging:  Labs: Reviewed    Assessment/Plan:     ESRD on dialysis    - [facility] each treatment is 2 hours 23 minutes. EDW 48kg. No residual renal function  - admitted with ACS, hypoxia, and hypervolemia  - SLED started yesterday as HD too high risk in setting of ACS. SLED remains high risk but less so though HD  - troponin continues to rise. Still unable to pull volume due to risk of cardiac demise with rapid volume shifts  - continue SLED today with UF 250cc/hr. Will advance  UF once ACS stabilized/resolved.         Chronic kidney disease-mineral and bone disorder    - continue calcitriol  - patient takes Auryxia TID w/meals at home; will hold while on CRRT            Irina Clark, PGY-5  Nephrology Fellow  [facility]  Pager: [phone]    ATTENDING PHYSICIAN ATTESTATION  I have personally interviewed and examined the patient. I thoroughly reviewed the demographic, clinical, laboratorial and imaging information available in medical records. I agree with the assessment and recommendations provided by the fellow Dr. Clark who was under my supervision.     DIALYSIS NOTE  Patient evaluated while undergoing Slow Low Efficiency Dialysis (SLED) indicated for CLIFFORD and hemodynamic instability. No complications, tolerating session with current UFR. Will continue current support.

## 2018-01-15 NOTE — SUBJECTIVE & OBJECTIVE
Interval History:   BP dropped after line placement yesterday and levophed started prior to SLED. UF 250cc/hr overnight. Received 4 units pRBC yesterday. FiO2 requirements increased from 5L HFNC to 8L HFNC. Net +800cc/24h. Troponin continues to rise; up to 38.7 today. Patient tells me cardiology is planning for supportive care at this time. Denies any chest pain. Does report back pain 2/2 laying flat for CRRT; also reports boredom.     Review of patient's allergies indicates:   Allergen Reactions    Sulfamethoxazole-trimethoprim Other (See Comments)     Pt is unsure if she is actually allergic to the medicine.      Current Facility-Administered Medications   Medication Frequency    0.9%  NaCl infusion (CRRT USE ONLY) PRN    0.9%  NaCl infusion (for blood administration) Q24H PRN    albuterol inhaler 2 puff Q6H PRN    ALPRAZolam tablet 1 mg TID PRN    amiodarone tablet 200 mg Daily    aspirin EC tablet 81 mg Daily    atorvastatin tablet 80 mg Daily    azithromycin tablet 500 mg Once    Followed by    [START ON 1/16/2018] azithromycin tablet 250 mg Daily    calcitRIOL capsule 0.25 mcg Daily    clopidogrel tablet 75 mg Daily    escitalopram oxalate tablet 20 mg Daily    fluconazole tablet 100 mg Daily    levOCARNitine capsule 250 mg Daily    levothyroxine tablet 50 mcg Before breakfast    magnesium sulfate 2g in water 50mL IVPB (premix) PRN    midodrine tablet 5 mg TID WM    nitroGLYCERIN SL tablet 0.4 mg Q5 Min PRN    norepinephrine 4 mg in dextrose 5% 250 mL infusion (premix) (titrating) Continuous    oxyCODONE 12 hr tablet 30 mg Q12H    pantoprazole EC tablet 40 mg Daily    pramipexole tablet 0.5 mg TID    prochlorperazine tablet 10 mg TID PRN    sodium chloride 0.9% flush 3 mL Q8H       Objective:     Vital Signs (Most Recent):  Temp: 98.2 °F (36.8 °C) (01/15/18 0701)  Pulse: 85 (01/15/18 1200)  Resp: (!) 24 (01/15/18 1200)  BP: (!) 93/54 (01/15/18 1200)  SpO2: 96 % (01/15/18 1200)  O2  Device (Oxygen Therapy): High Flow nasal Cannula (01/15/18 1200) Vital Signs (24h Range):  Temp:  [98.2 °F (36.8 °C)-99 °F (37.2 °C)] 98.2 °F (36.8 °C)  Pulse:  [75-92] 85  Resp:  [13-43] 24  SpO2:  [90 %-98 %] 96 %  BP: ()/(36-58) 93/54     Weight: 49 kg (108 lb 0.4 oz) (01/14/18 0405)  Body mass index is 21.1 kg/m².  Body surface area is 1.44 meters squared.    I/O last 3 completed shifts:  In: 4190.5 [P.O.:680; I.V.:2989.5; Blood:521]  Out: 3216 [Other:3216]    Physical Exam   Constitutional: She is oriented to person, place, and time. She appears well-developed and well-nourished.   HENT:   Head: Normocephalic and atraumatic.   Eyes: Conjunctivae and EOM are normal.   Cardiovascular: Normal rate and regular rhythm.    Pulmonary/Chest: Effort normal. She has no wheezes. She has no rales.   Abdominal: Soft. She exhibits distension. There is no tenderness.   Musculoskeletal: She exhibits edema. She exhibits no deformity.   Neurological: She is alert and oriented to person, place, and time.   Skin: Skin is warm and dry. She is not diaphoretic.       Significant Labs:  ABGs: No results for input(s): PH, PCO2, HCO3, POCSATURATED, BE in the last 168 hours.  Cardiac Markers: No results for input(s): CKMB, TROPONINT, MYOGLOBIN in the last 168 hours.  CBC:   Recent Labs  Lab 01/15/18  0400   WBC 11.22  11.22   RBC 3.28*  3.28*   HGB 10.0*  10.0*   HCT 30.3*  30.3*   PLT 89*  89*   MCV 92  92   MCH 30.5  30.5   MCHC 33.0  33.0     CMP:   Recent Labs  Lab 01/15/18  0400     106  106   CALCIUM 8.6*  8.6*  8.6*   ALBUMIN 2.6*  2.6*   PROT 6.2     140  140   K 4.5  4.5  4.5   CO2 24  24  24     107  107   BUN 9  9  9   CREATININE 1.5*  1.5*  1.5*   ALKPHOS 92   ALT 19   *   BILITOT 1.2*     All labs within the past 24 hours have been reviewed.     Significant Imaging:  Labs: Reviewed

## 2018-01-15 NOTE — ASSESSMENT & PLAN NOTE
- On MWF dialysis, last was on F. Appears volume overloaded on exam.   - Nephrology consulted. SLED started overnight. Unable to pull volume due to risk of cardiac demise with rapid volume shifts  - Requiring low dose levophed during SLED  - Continue midodrine 5 mg TID

## 2018-01-15 NOTE — ASSESSMENT & PLAN NOTE
- No current pain or non healing ulcers at present.  - Continue ASA/Plavix  - Hold cilostazol in the setting of heart failure exacerbation

## 2018-01-15 NOTE — ASSESSMENT & PLAN NOTE
- performs home hemodialysis via LUE AVF 5 days per week (SuMTThF); each treatment is 2 hours 23 minutes. EDW 48kg. No residual renal function  - admitted with ACS, hypoxia, and hypervolemia  - SLED started yesterday as HD too high risk in setting of ACS. SLED remains high risk but less so though HD  - troponin continues to rise. Still unable to pull volume due to risk of cardiac demise with rapid volume shifts  - continue SLED today with UF 250cc/hr. Will advance UF once ACS stabilized/resolved.

## 2018-01-16 PROBLEM — E83.39 HYPOPHOSPHATEMIA: Status: ACTIVE | Noted: 2018-01-01

## 2018-01-16 PROBLEM — G89.29 CHRONIC BACK PAIN: Status: ACTIVE | Noted: 2018-01-01

## 2018-01-16 PROBLEM — G25.81 RLS (RESTLESS LEGS SYNDROME): Status: ACTIVE | Noted: 2018-01-01

## 2018-01-16 PROBLEM — E87.70 HYPERVOLEMIA: Status: ACTIVE | Noted: 2018-01-01

## 2018-01-16 PROBLEM — M54.9 CHRONIC BACK PAIN: Status: ACTIVE | Noted: 2018-01-01

## 2018-01-16 NOTE — ASSESSMENT & PLAN NOTE
- performs home hemodialysis via LUE AVF 5 days per week (SuMTThF); each treatment is 2 hours 23 minutes. EDW 48kg. No residual renal function  - started on SLED > HD given troponin 38  - troponin now improving  - patient developed cramping with SLED this morning; treatment ended early. Unfortunately still with significant peripheral edema  - will plan for SCUF tonight (ideally would like to do HD with UF only however unlikely to be successful with BP 90/40s)  - will order flexeril PRN for muscle cramps  - hopefully fluid removal will be successful; HFNC can be weaned to NC; and we can transition to HD very soon

## 2018-01-16 NOTE — ASSESSMENT & PLAN NOTE
- CTA chest showed abnormal acute airspace disease left lower lobe suspicious for pneumonia  - Though patient is afebrile and without productive cough, still requiring up to 7 L O2  - Continue po azithromycin and IV ceftriaxone x 5 days   - Mucinex

## 2018-01-16 NOTE — SUBJECTIVE & OBJECTIVE
Interval History: no acute events overnight. SLED overnight, stopped this morning. No volume removed. Denies CP or SOB at rest, but having some dyspnea. Sitting in chair on exam. Still requiring up to 7 L O2. Endorses productive cough but denies fever/chills    Review of Systems   Constitution: Positive for weakness. Negative for chills and fever.   HENT: Negative for sore throat.    Eyes: Negative for blurred vision.   Cardiovascular: Positive for dyspnea on exertion. Negative for chest pain and palpitations.   Respiratory: Positive for cough. Negative for shortness of breath and wheezing.    Musculoskeletal: Positive for back pain and joint pain.   Gastrointestinal: Positive for bloating and abdominal pain. Negative for constipation, diarrhea, nausea and vomiting.   Neurological: Negative for dizziness, headaches and light-headedness.     Objective:     Vital Signs (Most Recent):  Temp: 98.4 °F (36.9 °C) (01/16/18 1100)  Pulse: 66 (01/16/18 1415)  Resp: (!) 28 (01/16/18 1415)  BP: (!) 91/55 (01/16/18 1415)  SpO2: 96 % (01/16/18 1415) Vital Signs (24h Range):  Temp:  [97.9 °F (36.6 °C)-99.3 °F (37.4 °C)] 98.4 °F (36.9 °C)  Pulse:  [] 66  Resp:  [12-53] 28  SpO2:  [91 %-99 %] 96 %  BP: ()/(36-78) 91/55     Weight: 49 kg (108 lb 0.4 oz)  Body mass index is 21.1 kg/m².     SpO2: 96 %  O2 Device (Oxygen Therapy): High Flow nasal Cannula      Intake/Output Summary (Last 24 hours) at 01/16/18 1454  Last data filed at 01/16/18 1400   Gross per 24 hour   Intake          5578.22 ml   Output             5144 ml   Net           434.22 ml       Lines/Drains/Airways     Central Venous Catheter Line                 Trialysis (Dialysis) Catheter 01/14/18 1346 right internal jugular 2 days          Drain                 Hemodialysis AV Graft Left upper arm -- days         Hemodialysis AV Fistula 01/14/18 0849 Left upper arm 2 days          Peripheral Intravenous Line                 Peripheral IV - Single Lumen  01/13/18 1841 Right Upper Arm 2 days                Physical Exam   Constitutional: She is oriented to person, place, and time. She appears well-developed and well-nourished.   HENT:   Head: Normocephalic and atraumatic.   Neck: Normal range of motion. Neck supple. JVD (to jaw) present.   Cardiovascular: Normal rate, regular rhythm and normal heart sounds.  Exam reveals no gallop and no friction rub.    No murmur heard.  Pulmonary/Chest: Effort normal.   Difficulty with taking a deep breath due to rib fractures  Rales at base of lungs, mild expiratory wheezing    Abdominal: Soft. Bowel sounds are normal. She exhibits distension.   Musculoskeletal: Normal range of motion. She exhibits no edema.   Neurological: She is alert and oriented to person, place, and time.   Skin: Skin is warm and dry.       Significant Labs: All pertinent lab results from the last 24 hours have been reviewed.    Significant Imaging:  CXR 1/16/18: Heart is enlarged diffuse increase in the pulmonary vascular and interstitial markings consistent with a history of congestive heart failure

## 2018-01-16 NOTE — ASSESSMENT & PLAN NOTE
Long hx of CAD with PCI in 1977 and 1980 and CABG x 3 in 6/2016. Still smokes. Has had CP for 3-4 months. No nitro as has not been that bad. Had recent fall with bleeding and drop in hemoglobin to 7 from 9 with increasing chest pain. ST changes in V1 improving now isolated to one lead and had some changes in this lead prior on EKG from 11/2017. She also currently has NO chest heaviness or symptoms of angina - thus this does not appear to be a STEMI. ST depressions in inferior and lateral leads improving. This is most likely demand ischemia in the setting of underlying severe CAD, new anemia, COPD/hypoxia.    - Echo shows EF 30-35% with severe left atrial enlargement. The inferior wall and apex are akinetic with global hypokinesis  - Holding off on heparin gtt for now as she is bleeding  - Continue ASA/plavix  - Received 4 units pRBC 1/14/18 . Hgb stable at 10  - Troponins are decreasing (38->23). Patient is chest-pain free  - Interventional cardiology consulted. Will likely need LHC electively but will treat conservatively.

## 2018-01-16 NOTE — PROGRESS NOTES
[facility]  Cardiology  Progress Note    Patient Name: Althea Hsieh  MRN: [id number]  Admission Date: 1/14/2018  Hospital Length of Stay: 2 days  Code Status: Full Code   Attending Physician: Ainsley More MD   Primary Care Physician: Davis Syed MD  Expected Discharge Date: 1/17/2018  Principal Problem:Unstable angina    Subjective:     Hospital Course:   1/14: Admitted to CCU for ACS, volume overload, and blood loss anemia. Nephrology consulted for SLED  1/15: SLED overnight. Required low dose levophed during dialysis. Troponins continued to rise (17>25>38) but flattened at 38. Interventional cardiology consulted for [facility]. Patient remained chest pain free. Still requiring 8 L of O2. Started on antibiotics for LLL pneumonia seen on CTA.    Interval History: no acute events overnight. SLED overnight, stopped this morning. No volume removed. Denies CP or SOB at rest, but having some dyspnea. Sitting in chair on exam. Still requiring up to 7 L O2. Endorses productive cough but denies fever/chills    Review of Systems   Constitution: Positive for weakness. Negative for chills and fever.   HENT: Negative for sore throat.    Eyes: Negative for blurred vision.   Cardiovascular: Positive for dyspnea on exertion. Negative for chest pain and palpitations.   Respiratory: Positive for cough. Negative for shortness of breath and wheezing.    Musculoskeletal: Positive for back pain and joint pain.   Gastrointestinal: Positive for bloating and abdominal pain. Negative for constipation, diarrhea, nausea and vomiting.   Neurological: Negative for dizziness, headaches and light-headedness.     Objective:     Vital Signs (Most Recent):  Temp: 98.4 °F (36.9 °C) (01/16/18 1100)  Pulse: 66 (01/16/18 1415)  Resp: (!) 28 (01/16/18 1415)  BP: (!) 91/55 (01/16/18 1415)  SpO2: 96 % (01/16/18 1415) Vital Signs (24h Range):  Temp:  [97.9 °F (36.6 °C)-99.3 °F (37.4 °C)] 98.4 °F (36.9 °C)  Pulse:  []  66  Resp:  [12-53] 28  SpO2:  [91 %-99 %] 96 %  BP: ()/(36-78) 91/55     Weight: 49 kg (108 lb 0.4 oz)  Body mass index is 21.1 kg/m².     SpO2: 96 %  O2 Device (Oxygen Therapy): High Flow nasal Cannula      Intake/Output Summary (Last 24 hours) at 01/16/18 1454  Last data filed at 01/16/18 1400   Gross per 24 hour   Intake          5578.22 ml   Output             5144 ml   Net           434.22 ml       Lines/Drains/Airways     Central Venous Catheter Line                 Trialysis (Dialysis) Catheter 01/14/18 1346 right internal jugular 2 days          Drain                 Hemodialysis AV Graft Left upper arm -- days         Hemodialysis AV Fistula 01/14/18 0849 Left upper arm 2 days          Peripheral Intravenous Line                 Peripheral IV - Single Lumen 01/13/18 1841 Right Upper Arm 2 days                Physical Exam   Constitutional: She is oriented to person, place, and time. She appears well-developed and well-nourished.   HENT:   Head: Normocephalic and atraumatic.   Neck: Normal range of motion. Neck supple. JVD (to jaw) present.   Cardiovascular: Normal rate, regular rhythm and normal heart sounds.  Exam reveals no gallop and no friction rub.    No murmur heard.  Pulmonary/Chest: Effort normal.   Difficulty with taking a deep breath due to rib fractures  Rales at base of lungs, mild expiratory wheezing    Abdominal: Soft. Bowel sounds are normal. She exhibits distension.   Musculoskeletal: Normal range of motion. She exhibits no edema.   Neurological: She is alert and oriented to person, place, and time.   Skin: Skin is warm and dry.       Significant Labs: All pertinent lab results from the last 24 hours have been reviewed.    Significant Imaging:  CXR 1/16/18: Heart is enlarged diffuse increase in the pulmonary vascular and interstitial markings consistent with a history of congestive heart failure    Assessment and Plan:       * Unstable angina    Long hx of CAD with PCI in 1977 and 1980  and CABG x 3 in 6/2016. Still smokes. Has had CP for 3-4 months. No nitro as has not been that bad. Had recent fall with bleeding and drop in hemoglobin to 7 from 9 with increasing chest pain. ST changes in V1 improving now isolated to one lead and had some changes in this lead prior on EKG from 11/2017. She also currently has NO chest heaviness or symptoms of angina - thus this does not appear to be a STEMI. ST depressions in inferior and lateral leads improving. This is most likely demand ischemia in the setting of underlying severe CAD, new anemia, COPD/hypoxia.    - Echo shows EF 30-35% with severe left atrial enlargement. The inferior wall and apex are akinetic with global hypokinesis  - Holding off on heparin gtt for now as she is bleeding  - Continue ASA/plavix  - Received 4 units pRBC 1/14/18 . Hgb stable at 10  - Troponins are decreasing (38->23). Patient is chest-pain free  - Interventional cardiology consulted. Will likely need LHC electively but will treat conservatively.          Hypophosphatemia    - could be contributing to muscle weakness/pain   - replace IV and po supplementation        Chronic back pain    - resume home oxycodone IR 30 mg prn   - can give flexeril as needed        RLS (restless legs syndrome)    -continue home pramipexole         Community acquired pneumonia    - CTA chest showed abnormal acute airspace disease left lower lobe suspicious for pneumonia  - Though patient is afebrile and without productive cough, still requiring up to 7 L O2  - Continue po azithromycin and IV ceftriaxone x 5 days   - Mucinex         Skin tear of forearm without complication    Wound care consulted        ACS (acute coronary syndrome)    See unstable angina         Thrombocytopenia    - likely due to chronic liver disease (polycystic liver?)  - continue to follow        PAD S/P femoral-popliteal bypass surgery    - No current pain or non healing ulcers at present.  - Continue ASA/Plavix  - Hold  cilostazol in the setting of heart failure exacerbation         Hypoxia    - likely multifactorial including acute blood loss anemia, pulmonary edema (volume overload), and developing pneumonia         HTN (hypertension)    - Holding anti-hypertensives as BP low during dialysis        Tobacco abuse    Smoking cessation discussed         ESRD on dialysis    - On MWF dialysis. Volume overloaded on admission   - Nephrology consulted. SLED continued overnight. Thus far, have been unable to pull volume due to risk of cardiac demise with rapid volume shifts  - As ACS is being treated appropriately and troponins are down-trending, patient is stable from a cardiac perspective. No longer requiring pressors during dialysis. As patient is still fluid overloaded, will try to remove some fluid via dialysis today  - Continue midodrine 5 mg TID        CAD (coronary artery disease)    Hx of PCI and CABG x 3. Will continue home medications.             VTE Risk Mitigation         Ordered     Low Risk of VTE  Once      01/14/18 0320          Chevy Johnston MD  Cardiology  [facility]

## 2018-01-16 NOTE — PROGRESS NOTES
Notified Ahmed of patient becoming restless and wanting to speak with the MD and get off CRRT. MD will come to bedside to speak with patient. Electrolytes within normal limits.

## 2018-01-16 NOTE — PLAN OF CARE
Problem: Patient Care Overview  Goal: Plan of Care Review  Patient remains on continuous CRRT, Levo d/c, 's @ 1723, Dr. Olson at bedside, patient denied CP or SOB at this time, EKG completed, Adenosine given, Amio gtt started, patient OOB to chair with minimal assist,  at bedside and attentive to patient,  VS and assessment per flow sheet, patient progressing towards goals as tolerated, plan of care reviewed with Althea Hsieh and family, all concerns addressed, will continue to monitor.

## 2018-01-16 NOTE — SUBJECTIVE & OBJECTIVE
Interval History:   afib with RVR yesterday afternoon; started on amiodarone infusion. Levophed stopped. No events overnight. Remained on RRT with UF 250cc/hr. Sitting in chair this morning. Complains of chest congestion and unable to cough it up 2/2 pain in ribs from rib fracture. Starting to have restless legs (which she normally has prior to cramping) and asked to come off RRT. She doesn't feel she has any fluid on her. Repeat CXR this PM with pulmonary edema. She continues to require 8L HFNC.   Net negative 600cc yesterday.     Review of patient's allergies indicates:   Allergen Reactions    Sulfamethoxazole-trimethoprim Other (See Comments)     Pt is unsure if she is actually allergic to the medicine.      Current Facility-Administered Medications   Medication Frequency    albuterol inhaler 2 puff Q6H PRN    ALPRAZolam tablet 1 mg TID PRN    amiodarone 360 mg/200 mL (1.8 mg/mL) infusion Continuous    aspirin EC tablet 81 mg Daily    atorvastatin tablet 80 mg Daily    azithromycin tablet 250 mg Daily    calcitRIOL capsule 0.25 mcg Daily    cefTRIAXone injection 1 g Q24H    clopidogrel tablet 75 mg Daily    dextromethorphan-guaifenesin  mg per 12 hr tablet 1 tablet BID    escitalopram oxalate tablet 20 mg Daily    fluconazole tablet 100 mg Daily    levOCARNitine capsule 250 mg Daily    levothyroxine tablet 125 mcg Before breakfast    midodrine tablet 5 mg TID WM    nitroGLYCERIN SL tablet 0.4 mg Q5 Min PRN    oxyCODONE immediate release tablet 30 mg Q6H PRN    pantoprazole EC tablet 40 mg Daily    pramipexole tablet 1 mg QHS    prochlorperazine tablet 10 mg TID PRN    sodium chloride 0.9% flush 3 mL Q8H       Objective:     Vital Signs (Most Recent):  Temp: 98 °F (36.7 °C) (01/16/18 1500)  Pulse: 65 (01/16/18 1536)  Resp: (!) 28 (01/16/18 1536)  BP: (!) 101/59 (01/16/18 1536)  SpO2: 97 % (01/16/18 1536)  O2 Device (Oxygen Therapy): High Flow nasal Cannula (01/16/18 1536) Vital Signs  (24h Range):  Temp:  [97.9 °F (36.6 °C)-98.4 °F (36.9 °C)] 98 °F (36.7 °C)  Pulse:  [] 65  Resp:  [12-53] 28  SpO2:  [91 %-99 %] 97 %  BP: ()/(34-78) 101/59     Weight: 49 kg (108 lb 0.4 oz) (01/14/18 0405)  Body mass index is 21.1 kg/m².  Body surface area is 1.44 meters squared.    I/O last 3 completed shifts:  In: 8852 [P.O.:680; I.V.:7551; Blood:521; IV Piggyback:100]  Out: 9188 [Other:9188]    Physical Exam   Constitutional: She appears well-developed and well-nourished. No distress.   HENT:   Head: Normocephalic and atraumatic.   Eyes: Conjunctivae and EOM are normal.   Cardiovascular: Normal rate and regular rhythm.    Pulmonary/Chest: Effort normal and breath sounds normal.   Abdominal: Soft. She exhibits distension.   Musculoskeletal: She exhibits edema. She exhibits no deformity.   Skin: Skin is warm and dry. She is not diaphoretic.       Significant Labs:  CBC:   Recent Labs  Lab 01/16/18  0345   WBC 5.48   RBC 3.35*   HGB 10.0*   HCT 31.9*   PLT 80*   MCV 95   MCH 29.9   MCHC 31.3*     CMP:   Recent Labs  Lab 01/16/18  0345 01/16/18  1442     100  100 98   CALCIUM 8.6*  8.6*  8.6* 8.2*   ALBUMIN 2.5*  2.5* 2.6*   PROT 6.4  --      142  142 141   K 4.4  4.4  4.4 4.7   CO2 26  26  26 26     108  108 106   BUN 3*  3*  3* 5*   CREATININE 0.6  0.6  0.6 1.0   ALKPHOS 96  --    ALT 19  --    AST 82*  --    BILITOT 0.5  --      All labs within the past 24 hours have been reviewed.     Significant Imaging:  Labs: Reviewed  X-Ray: Reviewed

## 2018-01-16 NOTE — ASSESSMENT & PLAN NOTE
- On MWF dialysis. Volume overloaded on admission   - Nephrology consulted. SLED continued overnight. Thus far, have been unable to pull volume due to risk of cardiac demise with rapid volume shifts  - As ACS is being treated appropriately and troponins are down-trending, patient is stable from a cardiac perspective. No longer requiring pressors during dialysis. As patient is still fluid overloaded, will try to remove some fluid via dialysis today  - Continue midodrine 5 mg TID

## 2018-01-16 NOTE — PLAN OF CARE
Problem: Patient Care Overview  Goal: Plan of Care Review  No acute events throughout night, Sr and off Levophed. CRRT in progress. VS and assessment per flow sheet, patient progressing towards goals as tolerated, plan of care reviewed with Althea Hsieh and family, all concerns addressed, will continue to monitor.

## 2018-01-16 NOTE — PROGRESS NOTES
[facility]  Nephrology  Progress Note    Patient Name: Althea Hsieh  MRN: [id number]  Admission Date: 1/14/2018  Hospital Length of Stay: 2 days  Attending Provider: Ainsley More MD   Primary Care Physician: Davis Syed MD  Principal Problem:Unstable angina    Subjective:     HPI: Ms. Hsieh is a 61 yo WF with CAD s/p CABG 2016, PVD, COPD, ADPKD with renal and liver involvement, and ESRD on home HD who presented to an OSH on 1/14 with chest pressure, SOB, and new O2 requirement. She recently had a fall which resulted in L rib fractures and bleeding from both arms. She received 1 unit pRBC at OSH and was transferred to [facility] for further care. Upon further chart review she has been dealing with anemia of unknown source for some time. She receives Micera outpatient. She has been sent to GI for colonoscopy but never went. She [facility] Each session is 2 hours 23 minutes. EDW 48kg. She no longer makes urine. She has low BP at baseline and takes midodrine with HD. She hasn't performed HD since Friday. She recent recent kidney cyst rupture which resulted in mild fever and hematuria; symptoms have since resolved. She currently denies any chest pressure. Troponin has trended from 0.15 to 3.67 and now 25.4. SBP 70-90s. She is developing worsening hypoxia on 5L NC. Nephrology consulted for urgent HD for volume overload. Primary team placing trialysis catheter now. Consent for dialysis obtained by patient and placed in chart.     Interval History:   afib with RVR yesterday afternoon; started on amiodarone infusion. Levophed stopped. No events overnight. Remained on RRT with UF 250cc/hr. Sitting in chair this morning. Complains of chest congestion and unable to cough it up 2/2 pain in ribs from rib fracture. Starting to have restless legs (which she normally has prior to cramping) and asked to come off RRT. She doesn't feel she has any fluid on her.  Repeat CXR this PM with pulmonary edema. She continues to require 8L HFNC.   Net negative 600cc yesterday.     Review of patient's allergies indicates:   Allergen Reactions    Sulfamethoxazole-trimethoprim Other (See Comments)     Pt is unsure if she is actually allergic to the medicine.      Current Facility-Administered Medications   Medication Frequency    albuterol inhaler 2 puff Q6H PRN    ALPRAZolam tablet 1 mg TID PRN    amiodarone 360 mg/200 mL (1.8 mg/mL) infusion Continuous    aspirin EC tablet 81 mg Daily    atorvastatin tablet 80 mg Daily    azithromycin tablet 250 mg Daily    calcitRIOL capsule 0.25 mcg Daily    cefTRIAXone injection 1 g Q24H    clopidogrel tablet 75 mg Daily    dextromethorphan-guaifenesin  mg per 12 hr tablet 1 tablet BID    escitalopram oxalate tablet 20 mg Daily    fluconazole tablet 100 mg Daily    levOCARNitine capsule 250 mg Daily    levothyroxine tablet 125 mcg Before breakfast    midodrine tablet 5 mg TID WM    nitroGLYCERIN SL tablet 0.4 mg Q5 Min PRN    oxyCODONE immediate release tablet 30 mg Q6H PRN    pantoprazole EC tablet 40 mg Daily    pramipexole tablet 1 mg QHS    prochlorperazine tablet 10 mg TID PRN    sodium chloride 0.9% flush 3 mL Q8H       Objective:     Vital Signs (Most Recent):  Temp: 98 °F (36.7 °C) (01/16/18 1500)  Pulse: 65 (01/16/18 1536)  Resp: (!) 28 (01/16/18 1536)  BP: (!) 101/59 (01/16/18 1536)  SpO2: 97 % (01/16/18 1536)  O2 Device (Oxygen Therapy): High Flow nasal Cannula (01/16/18 1536) Vital Signs (24h Range):  Temp:  [97.9 °F (36.6 °C)-98.4 °F (36.9 °C)] 98 °F (36.7 °C)  Pulse:  [] 65  Resp:  [12-53] 28  SpO2:  [91 %-99 %] 97 %  BP: ()/(34-78) 101/59     Weight: 49 kg (108 lb 0.4 oz) (01/14/18 0405)  Body mass index is 21.1 kg/m².  Body surface area is 1.44 meters squared.    I/O last 3 completed shifts:  In: 8852 [P.O.:680; I.V.:7551; Blood:521; IV Piggyback:100]  Out: 9188 [Other:9188]    Physical Exam    Constitutional: She appears well-developed and well-nourished. No distress.   HENT:   Head: Normocephalic and atraumatic.   Eyes: Conjunctivae and EOM are normal.   Cardiovascular: Normal rate and regular rhythm.    Pulmonary/Chest: Effort normal and breath sounds normal.   Abdominal: Soft. She exhibits distension.   Musculoskeletal: She exhibits edema. She exhibits no deformity.   Skin: Skin is warm and dry. She is not diaphoretic.       Significant Labs:  CBC:   Recent Labs  Lab 01/16/18  0345   WBC 5.48   RBC 3.35*   HGB 10.0*   HCT 31.9*   PLT 80*   MCV 95   MCH 29.9   MCHC 31.3*     CMP:   Recent Labs  Lab 01/16/18  0345 01/16/18  1442     100  100 98   CALCIUM 8.6*  8.6*  8.6* 8.2*   ALBUMIN 2.5*  2.5* 2.6*   PROT 6.4  --      142  142 141   K 4.4  4.4  4.4 4.7   CO2 26  26  26 26     108  108 106   BUN 3*  3*  3* 5*   CREATININE 0.6  0.6  0.6 1.0   ALKPHOS 96  --    ALT 19  --    AST 82*  --    BILITOT 0.5  --      All labs within the past 24 hours have been reviewed.     Significant Imaging:  Labs: Reviewed  X-Ray: Reviewed    Assessment/Plan:     ESRD on dialysis    - performs home hemodialysis via LUE AVF 5 days per week ([facility]); each treatment is 2 hours 23 minutes. EDW 48kg. No residual renal function  - started on SLED > HD given troponin 38  - troponin now improving  - patient developed cramping with SLED this morning; treatment ended early. Unfortunately still with significant peripheral edema  - will plan for SCUF tonight (ideally would like to do HD with UF only however unlikely to be successful with BP 90/40s)  - will order flexeril PRN for muscle cramps  - hopefully fluid removal will be successful; HFNC can be weaned to NC; and we can transition to HD very soon        Chronic kidney disease-mineral and bone disorder    - continue calcitriol  - patient takes Auryxia TID w/meals at home; will hold while on CRRT            Irina Clark, PGY-5  Nephrology  Fellow  [facility]  Pager: [phone]    ATTENDING PHYSICIAN ATTESTATION  I have personally interviewed and examined the patient. I thoroughly reviewed the demographic, clinical, laboratorial and imaging information available in medical records. I agree with the assessment and recommendations provided by the subspecialty resident. Dr. Roach was under my supervision.     DIALYSIS NOTE  Patient evaluated while undergoing Slow Low Efficiency Dialysis (SLED) indicated for CLIFFORD and hemodynamic instability. No complications, tolerating session with current UFR. Will restart SCUF tonight after a break due to cramping. Has pulmonary edema and needs UF. Recommend vasopressors to allow UF,

## 2018-01-16 NOTE — PLAN OF CARE
Problem: Patient Care Overview  Goal: Plan of Care Review  Outcome: Ongoing (interventions implemented as appropriate)   See vital signs and assessments in flowsheets. See below for updates on today's progress.     Pulmonary: 6-8 L high flow nasal cannula. O2>92%. VBG 24    Cardiovascular: NSR 60-80's. BP 's systolic.     Neurological: AAO, afebrile     Gastrointestinal: no Bm. Renal diet.     Genitourinary: anuric. Taken off CRRT earlier in day. To be restarted at night for 12 hours.     Endocrine: normoglycemic     Integumentary/Other: replace phos on shift.     Infusions: amio.     Patient progressing towards goals as tolerated, plan of care communicated and reviewed with patient and family. All concerns addressed. Will continue to monitor.

## 2018-01-17 PROBLEM — L03.90 CELLULITIS: Status: ACTIVE | Noted: 2017-01-01

## 2018-01-17 NOTE — PROGRESS NOTES
[facility]  Cardiology  Progress Note    Patient Name: Althea Hsieh  MRN: [id number]  Admission Date: 1/14/2018  Hospital Length of Stay: 3 days  Code Status: Full Code   Attending Physician: Ainsley More MD   Primary Care Physician: Davis Syed MD  Expected Discharge Date: 1/19/2018  Principal Problem:Unstable angina    Subjective:     Hospital Course:   1/14: Admitted to CCU for ACS, volume overload, and blood loss anemia. Nephrology consulted for SLED  1/15: SLED overnight. Required low dose levophed during dialysis. Troponins continued to rise (17>25>38) but flattened at 38. Interventional cardiology consulted for C. Patient remained chest pain free. Still requiring 8 L of O2. Started on antibiotics for LLL pneumonia seen on CTA.    Interval History: SLED overnight with . Patient had increase in O2 requirements (up to 12 L). Occasionally requiring levophed during fluid removal. This morning, denies SOB at rest, CP, palpitations. Has some new leg pain in left anterior shin, noted to be erythematous and slightly warm.    Review of Systems   Constitution: Positive for weakness. Negative for chills and fever.   HENT: Negative for sore throat.    Eyes: Negative for blurred vision.   Cardiovascular: Positive for dyspnea on exertion. Negative for chest pain and palpitations.   Respiratory: Positive for cough. Negative for shortness of breath and wheezing.    Musculoskeletal: Positive for back pain and joint pain.   Gastrointestinal: Positive for bloating and abdominal pain. Negative for constipation, diarrhea, nausea and vomiting.   Neurological: Negative for dizziness, headaches and light-headedness.     Objective:     Vital Signs (Most Recent):  Temp: 97.6 °F (36.4 °C) (01/17/18 1500)  Pulse: (!) 58 (01/17/18 1600)  Resp: (!) 35 (01/17/18 1600)  BP: (!) 87/51 (01/17/18 1600)  SpO2: (!) 93 % (01/17/18 1600) Vital Signs (24h Range):  Temp:  [96 °F (35.6 °C)-99 °F (37.2 °C)]  97.6 °F (36.4 °C)  Pulse:  [55-82] 58  Resp:  [11-50] 35  SpO2:  [81 %-100 %] 93 %  BP: ()/(28-83) 87/51     Weight: 49 kg (108 lb 0.4 oz)  Body mass index is 21.1 kg/m².     SpO2: (!) 93 %  O2 Device (Oxygen Therapy): High Flow nasal Cannula      Intake/Output Summary (Last 24 hours) at 01/17/18 1624  Last data filed at 01/17/18 1600   Gross per 24 hour   Intake          4828.62 ml   Output             4777 ml   Net            51.62 ml       Lines/Drains/Airways     Central Venous Catheter Line                 Trialysis (Dialysis) Catheter 01/14/18 1346 right internal jugular 3 days          Drain                 Hemodialysis AV Graft Left upper arm -- days         Hemodialysis AV Fistula 01/14/18 0849 Left upper arm 3 days          Peripheral Intravenous Line                 Peripheral IV - Single Lumen 01/13/18 1841 Right Upper Arm 3 days                Physical Exam   Constitutional: She is oriented to person, place, and time. She appears well-developed and well-nourished.   HENT:   Head: Normocephalic and atraumatic.   Neck: Normal range of motion. Neck supple. JVD (to jaw) present.   Cardiovascular: Normal rate, regular rhythm and normal heart sounds.  Exam reveals no gallop and no friction rub.    No murmur heard.  Pulmonary/Chest: Effort normal.   Difficulty with taking a deep breath due to rib fractures  Rales at base of lungs, mild expiratory wheezing    Abdominal: Soft. Bowel sounds are normal. She exhibits distension.   Musculoskeletal: Normal range of motion. She exhibits no edema.   Neurological: She is alert and oriented to person, place, and time.   Skin: Skin is warm and dry.       Significant Labs: All pertinent lab results from the last 24 hours have been reviewed.    Significant Imaging:   CXR 1/16 shows diffuse increase in the pulmonary vascular and interstitial markings     Assessment and Plan:       * Unstable angina    Long hx of CAD with PCI in 1977 and 1980 and CABG x 3 in 6/2016.  Still smokes. Has had CP for 3-4 months. No nitro as has not been that bad. Had recent fall with bleeding and drop in hemoglobin to 7 from 9 with increasing chest pain. ST changes in V1 improving now isolated to one lead and had some changes in this lead prior on EKG from 11/2017. She also currently has NO chest heaviness or symptoms of angina - thus this does not appear to be a STEMI. ST depressions in inferior and lateral leads improving. This is most likely demand ischemia in the setting of underlying severe CAD, new anemia, COPD/hypoxia.    - Echo shows EF 30-35% with severe left atrial enlargement. The inferior wall and apex are akinetic with global hypokinesis  - Holding off on heparin gtt for now as she is bleeding  - Continue ASA/plavix  - Received 4 units pRBC 1/14/18 . Hgb stable at 10  - Troponins are decreasing (38->23). Patient is chest-pain free  - Interventional cardiology consulted. Will likely need LHC electively but will treat conservatively.          Hypophosphatemia    - could be contributing to muscle weakness/pain   - replace IV and po supplementation        Chronic back pain    - resume home oxycodone IR 30 mg prn   - can give flexeril as needed        RLS (restless legs syndrome)    -continue home pramipexole         Community acquired pneumonia    - CTA chest showed abnormal acute airspace disease left lower lobe suspicious for pneumonia  - Though patient is afebrile and without productive cough, still requiring up to 7 L O2  - Continue  IV ceftriaxone x 5 days. D/c azithromycin   - Mucinex         Skin tear of forearm without complication    Wound care consulted        ACS (acute coronary syndrome)    See unstable angina         Thrombocytopenia    - likely due to chronic liver disease (polycystic liver?)  - continue to follow        Cellulitis    Erythema and warmth noted on left anterior shin  Area outlined with marker  Start doxycycline 100 mg Q12        PAD S/P femoral-popliteal bypass  surgery    - No current pain or non healing ulcers at present.  - Continue ASA/Plavix  - Hold cilostazol in the setting of heart failure exacerbation         Hypoxia    - likely multifactorial including acute blood loss anemia, pulmonary edema (volume overload), and developing pneumonia         HTN (hypertension)    - Holding anti-hypertensives as BP low during dialysis        Tobacco abuse    Smoking cessation discussed         ESRD on dialysis    - On MWF dialysis. Volume overloaded on admission   - Nephrology consulted. SLED continued overnight. Thus far, have been unable to pull volume due to risk of cardiac demise with rapid volume shifts  - As ACS is being treated appropriately and troponins are down-trending, patient is stable from a cardiac perspective. No longer requiring pressors during dialysis. As patient is still fluid overloaded, will try to remove some fluid via dialysis today  - Stopped scheduled midodrine. Will give midodrine 10 mg prior to dialysis sessions         CAD (coronary artery disease)    Hx of PCI and CABG x 3. Will continue home medications.             VTE Risk Mitigation         Ordered     Low Risk of VTE  Once      01/14/18 0320          Chevy Johnston MD  Cardiology  [facility]

## 2018-01-17 NOTE — PROGRESS NOTES
[facility]  Nephrology  Progress Note    Patient Name: Althea Hsieh  MRN: [id number]  Admission Date: 1/14/2018  Hospital Length of Stay: 3 days  Attending Provider: Aisnley More MD   Primary Care Physician: Davis Syed MD  Principal Problem:Unstable angina    Subjective:     HPI: Ms. Hsieh is a 61 yo WF with CAD s/p CABG 2016, PVD, COPD, ADPKD with renal and liver involvement, and ESRD on home HD who presented to an OSH on 1/14 with chest pressure, SOB, and new O2 requirement. She recently had a fall which resulted in L rib fractures and bleeding from both arms. She received 1 unit pRBC at OSH and was transferred to [facility] for further care. Upon further chart review she has been dealing with anemia of unknown source for some time. She receives Micera outpatient. She has been sent to GI for colonoscopy but never went. [facility] Each session is 2 hours 23 minutes. EDW 48kg. She no longer makes urine. She has low BP at baseline and takes midodrine with HD. She hasn't performed HD since Friday. She recent recent kidney cyst rupture which resulted in mild fever and hematuria; symptoms have since resolved. She currently denies any chest pressure. Troponin has trended from 0.15 to 3.67 and now 25.4. SBP 70-90s. She is developing worsening hypoxia on 5L NC. Nephrology consulted for urgent HD for volume overload. Primary team placing trialysis catheter now. Consent for dialysis obtained by patient and placed in chart.     Interval History:   SCUF started yesterday evening. Patient took a flexeril prior to starting and believes this helped prevent any muscle cramping. Tolerated well. HR dropped to 50s overnight and amiodarone gtt was discontinued. For some reason patient only ran with a UF of 250cc/hr. FiO2 increased from 6L HFNC to 15L HFNC. Back down to 10L this morning. Net POSITIVE 1L yesterday. Patient continues to complain of cough with  bloody sputum. Initially refused continuing RRT today but later agreed.     Review of patient's allergies indicates:   Allergen Reactions    Sulfamethoxazole-trimethoprim Other (See Comments)     Pt is unsure if she is actually allergic to the medicine.      Current Facility-Administered Medications   Medication Frequency    0.9%  NaCl infusion (CRRT USE ONLY) Continuous    albuterol inhaler 2 puff Q6H PRN    ALPRAZolam tablet 1 mg TID PRN    amiodarone tablet 200 mg Daily    aspirin EC tablet 81 mg Daily    atorvastatin tablet 80 mg Daily    azithromycin tablet 250 mg Daily    calcitRIOL capsule 0.25 mcg Daily    cefTRIAXone injection 1 g Q24H    clopidogrel tablet 75 mg Daily    cyclobenzaprine tablet 5 mg TID PRN    dextromethorphan-guaifenesin  mg per 12 hr tablet 1 tablet BID    escitalopram oxalate tablet 20 mg Daily    fluconazole tablet 100 mg Daily    levOCARNitine capsule 250 mg Daily    levothyroxine tablet 125 mcg Before breakfast    magnesium sulfate 2g in water 50mL IVPB (premix) PRN    metoprolol succinate (TOPROL-XL) 24 hr split tablet 12.5 mg Daily    nitroGLYCERIN SL tablet 0.4 mg Q5 Min PRN    norepinephrine 4 mg in dextrose 5% 250 mL infusion (premix) (titrating) Continuous    oxyCODONE immediate release tablet 30 mg Q6H PRN    pantoprazole EC tablet 40 mg Daily    polyethylene glycol packet 17 g Daily    pramipexole tablet 1 mg QHS    prochlorperazine tablet 10 mg TID PRN    sodium chloride 0.9% flush 3 mL Q8H       Objective:     Vital Signs (Most Recent):  Temp: 97.4 °F (36.3 °C) (01/17/18 0700)  Pulse: 74 (01/17/18 1013)  Resp: (!) 23 (01/17/18 1013)  BP: (!) 87/47 (01/17/18 1017)  SpO2: (!) 93 % (01/17/18 1013)  O2 Device (Oxygen Therapy): High Flow nasal Cannula (01/17/18 1000) Vital Signs (24h Range):  Temp:  [97.4 °F (36.3 °C)-99 °F (37.2 °C)] 97.4 °F (36.3 °C)  Pulse:  [55-82] 74  Resp:  [13-50] 23  SpO2:  [81 %-100 %] 93 %  BP: ()/(28-83) 87/47      Weight: 49 kg (108 lb 0.4 oz) (01/14/18 0405)  Body mass index is 21.1 kg/m².  Body surface area is 1.44 meters squared.    I/O last 3 completed shifts:  In: 6655.3 [P.O.:1200; I.V.:5205.3; IV Piggyback:250]  Out: 5707 [Other:5707]    Physical Exam   Constitutional: She is oriented to person, place, and time. She appears well-developed and well-nourished. No distress.   HENT:   Head: Normocephalic and atraumatic.   Eyes: Conjunctivae and EOM are normal.   Cardiovascular: Normal rate and regular rhythm.    Pulmonary/Chest: Effort normal. She has rales.   Abdominal: Soft. She exhibits distension.   Musculoskeletal: She exhibits edema. She exhibits no deformity.   Neurological: She is alert and oriented to person, place, and time.   Skin: Skin is warm and dry. She is not diaphoretic.       Significant Labs:  CBC:   Recent Labs  Lab 01/17/18  0359   WBC 6.93   RBC 3.29*   HGB 9.7*   HCT 31.9*   PLT 85*   MCV 97   MCH 29.5   MCHC 30.4*     CMP:   Recent Labs  Lab 01/17/18  0359     108  108   CALCIUM 7.6*  7.6*  7.6*   ALBUMIN 2.6*  2.6*   PROT 6.6     142  142   K 4.1  4.1  4.1   CO2 22*  22*  22*     107  107   BUN 12  12  12   CREATININE 1.9*  1.9*  1.9*   ALKPHOS 101   ALT 21   AST 63*   BILITOT 0.4     All labs within the past 24 hours have been reviewed.     Significant Imaging:  Labs: Reviewed  X-Ray: Reviewed    Assessment/Plan:     ESRD on dialysis    - performs home hemodialysis via LUE AVF 5 days per week ([facility]); each treatment is 2 hours 23 minutes. EDW 48kg. No residual renal function  - started on SLED > HD given troponin 38. Troponin improved to 23 with medical management  - remains volume overloaded. Performed SCUF last night however UF kept at minimal rate; unsure why. Patient net +1L yesterday. Also with increased FiO2 requirements  - increase UF to 350cc/hr this morning. Tolerating well. Will continue SCUF today. Will increase rate to 450cc/hr as tolerated  -  planning to continue SCUF or SLED until FiO2 improved  - flexeril PRN for dialysis-related muscle cramps        Chronic kidney disease-mineral and bone disorder    - continue calcitriol  - patient takes Auryxia TID w/meals at home; will hold while on CRRT            Irina Clark, PGY-5  Nephrology Fellow  [facility]  Pager: [phone]    ATTENDING PHYSICIAN ATTESTATION  I have personally interviewed and examined the patient. I thoroughly reviewed the demographic, clinical, laboratorial and imaging information available in medical records. I agree with the assessment and recommendations provided by the subspecialty resident. Dr. Clark was under my supervision.     DIALYSIS NOTE  Patient evaluated while undergoing Slow Low Efficiency Dialysis (SLED) indicated for CLIFFORD and hemodynamic instability. No complications, tolerating session with current UFR. Will continue current support.

## 2018-01-17 NOTE — SUBJECTIVE & OBJECTIVE
Interval History: SLED overnight with . Patient had increase in O2 requirements (up to 12 L). Occasionally requiring levophed during fluid removal. This morning, denies SOB at rest, CP, palpitations. Has some new leg pain in left anterior shin, noted to be erythematous and slightly warm.    Review of Systems   Constitution: Positive for weakness. Negative for chills and fever.   HENT: Negative for sore throat.    Eyes: Negative for blurred vision.   Cardiovascular: Positive for dyspnea on exertion. Negative for chest pain and palpitations.   Respiratory: Positive for cough. Negative for shortness of breath and wheezing.    Musculoskeletal: Positive for back pain and joint pain.   Gastrointestinal: Positive for bloating and abdominal pain. Negative for constipation, diarrhea, nausea and vomiting.   Neurological: Negative for dizziness, headaches and light-headedness.     Objective:     Vital Signs (Most Recent):  Temp: 97.6 °F (36.4 °C) (01/17/18 1500)  Pulse: (!) 58 (01/17/18 1600)  Resp: (!) 35 (01/17/18 1600)  BP: (!) 87/51 (01/17/18 1600)  SpO2: (!) 93 % (01/17/18 1600) Vital Signs (24h Range):  Temp:  [96 °F (35.6 °C)-99 °F (37.2 °C)] 97.6 °F (36.4 °C)  Pulse:  [55-82] 58  Resp:  [11-50] 35  SpO2:  [81 %-100 %] 93 %  BP: ()/(28-83) 87/51     Weight: 49 kg (108 lb 0.4 oz)  Body mass index is 21.1 kg/m².     SpO2: (!) 93 %  O2 Device (Oxygen Therapy): High Flow nasal Cannula      Intake/Output Summary (Last 24 hours) at 01/17/18 1624  Last data filed at 01/17/18 1600   Gross per 24 hour   Intake          4828.62 ml   Output             4777 ml   Net            51.62 ml       Lines/Drains/Airways     Central Venous Catheter Line                 Trialysis (Dialysis) Catheter 01/14/18 1346 right internal jugular 3 days          Drain                 Hemodialysis AV Graft Left upper arm -- days         Hemodialysis AV Fistula 01/14/18 0849 Left upper arm 3 days          Peripheral Intravenous Line                  Peripheral IV - Single Lumen 01/13/18 1841 Right Upper Arm 3 days                Physical Exam   Constitutional: She is oriented to person, place, and time. She appears well-developed and well-nourished.   HENT:   Head: Normocephalic and atraumatic.   Neck: Normal range of motion. Neck supple. JVD (to jaw) present.   Cardiovascular: Normal rate, regular rhythm and normal heart sounds.  Exam reveals no gallop and no friction rub.    No murmur heard.  Pulmonary/Chest: Effort normal.   Difficulty with taking a deep breath due to rib fractures  Rales at base of lungs, mild expiratory wheezing    Abdominal: Soft. Bowel sounds are normal. She exhibits distension.   Musculoskeletal: Normal range of motion. She exhibits no edema.   Neurological: She is alert and oriented to person, place, and time.   Skin: Skin is warm and dry.       Significant Labs: All pertinent lab results from the last 24 hours have been reviewed.    Significant Imaging:   CXR 1/16 shows diffuse increase in the pulmonary vascular and interstitial markings

## 2018-01-17 NOTE — ASSESSMENT & PLAN NOTE
- On MWF dialysis. Volume overloaded on admission   - Nephrology consulted. SLED continued overnight. Thus far, have been unable to pull volume due to risk of cardiac demise with rapid volume shifts  - As ACS is being treated appropriately and troponins are down-trending, patient is stable from a cardiac perspective. No longer requiring pressors during dialysis. As patient is still fluid overloaded, will try to remove some fluid via dialysis today  - Stopped scheduled midodrine. Will give midodrine 10 mg prior to dialysis sessions

## 2018-01-17 NOTE — PLAN OF CARE
Problem: Patient Care Overview  Goal: Plan of Care Review  No acute events throughout night, remains on Amiodarone infusion. CRRT continued. VS and assessment per flow sheet, patient progressing towards goals as tolerated, plan of care reviewed with Althea Hsieh and family, all concerns addressed, will continue to monitor.

## 2018-01-17 NOTE — PLAN OF CARE
Problem: Patient Care Overview  Goal: Plan of Care Review  Outcome: Ongoing (interventions implemented as appropriate)  See vital signs and assessments in flowsheets. See below for updates on today's progress.     Pulmonary: 8 L high flow nasal cannula, O2sat>95%    Cardiovascular: SR 60-80's. BP goal systolic >90, levo titrated to maintain. CVP 18    Neurological: AAO, Afebrile     Gastrointestinal: renal diet, no BM     Genitourinary: anuric, CRRT     Endocrine: normoglycemic     Integumentary/Other: blanchable redness present to left calf, MD made aware. Monitoring and marked site.     Infusions: levo    Patient progressing towards goals as tolerated, plan of care communicated and reviewed with patient and family. All concerns addressed. Will continue to monitor.

## 2018-01-17 NOTE — PROGRESS NOTES
Notified [facility] of patient turned off amio due to heart rate. [facility] ordered to turn patient back on amio at 0.5. Patient heart rate 67.

## 2018-01-17 NOTE — PROGRESS NOTES
Rhonda piedra Rn at bedside to change UF to 350 per order. Plan to keep patient on until lunch and then disconnect until night.

## 2018-01-17 NOTE — ASSESSMENT & PLAN NOTE
Erythema and warmth noted on left anterior shin  Area outlined with marker  Start doxycycline 100 mg Q12

## 2018-01-17 NOTE — PROGRESS NOTES
MD Olson and team at bedside. Notified about redness to the left lower extremity from where the BP cuff was located. Bp cuff switched to a different location. Site marked, will continue to monitor.     Ordered for levo to be started to maintain systolic >85

## 2018-01-17 NOTE — PROGRESS NOTES
Notified Md Olson of patient BP 70/40's. Ordered to start levo for systolic >90. Also ordered to hold metoprolol.

## 2018-01-17 NOTE — ASSESSMENT & PLAN NOTE
- [facility] each treatment is 2 hours 23 minutes. EDW 48kg. No residual renal function  - started on SLED > HD given troponin 38. Troponin improved to 23 with medical management  - remains volume overloaded. Performed SCUF last night however UF kept at minimal rate; unsure why. Patient net +1L yesterday. Also with increased FiO2 requirements  - increase UF to 350cc/hr this morning. Tolerating well. Will continue SCUF today. Will increase rate to 450cc/hr as tolerated  - planning to continue SCUF or SLED until FiO2 improved  - flexeril PRN for dialysis-related muscle cramps

## 2018-01-17 NOTE — ASSESSMENT & PLAN NOTE
- CTA chest showed abnormal acute airspace disease left lower lobe suspicious for pneumonia  - Though patient is afebrile and without productive cough, still requiring up to 7 L O2  - Continue  IV ceftriaxone x 5 days. D/c azithromycin   - Mucinex

## 2018-01-17 NOTE — PROGRESS NOTES
Spoke with Whitney clarify new order for metoprolol. Ordered okay to give with patient Bp 90-50's with heart rate of 68.

## 2018-01-17 NOTE — SUBJECTIVE & OBJECTIVE
Interval History:   SCUF started yesterday evening. Patient took a flexeril prior to starting and believes this helped prevent any muscle cramping. Tolerated well. HR dropped to 50s overnight and amiodarone gtt was discontinued. For some reason patient only ran with a UF of 250cc/hr. FiO2 increased from 6L HFNC to 15L HFNC. Back down to 10L this morning. Net POSITIVE 1L yesterday. Patient continues to complain of cough with bloody sputum. Initially refused continuing RRT today but later agreed.     Review of patient's allergies indicates:   Allergen Reactions    Sulfamethoxazole-trimethoprim Other (See Comments)     Pt is unsure if she is actually allergic to the medicine.      Current Facility-Administered Medications   Medication Frequency    0.9%  NaCl infusion (CRRT USE ONLY) Continuous    albuterol inhaler 2 puff Q6H PRN    ALPRAZolam tablet 1 mg TID PRN    amiodarone tablet 200 mg Daily    aspirin EC tablet 81 mg Daily    atorvastatin tablet 80 mg Daily    azithromycin tablet 250 mg Daily    calcitRIOL capsule 0.25 mcg Daily    cefTRIAXone injection 1 g Q24H    clopidogrel tablet 75 mg Daily    cyclobenzaprine tablet 5 mg TID PRN    dextromethorphan-guaifenesin  mg per 12 hr tablet 1 tablet BID    escitalopram oxalate tablet 20 mg Daily    fluconazole tablet 100 mg Daily    levOCARNitine capsule 250 mg Daily    levothyroxine tablet 125 mcg Before breakfast    magnesium sulfate 2g in water 50mL IVPB (premix) PRN    metoprolol succinate (TOPROL-XL) 24 hr split tablet 12.5 mg Daily    nitroGLYCERIN SL tablet 0.4 mg Q5 Min PRN    norepinephrine 4 mg in dextrose 5% 250 mL infusion (premix) (titrating) Continuous    oxyCODONE immediate release tablet 30 mg Q6H PRN    pantoprazole EC tablet 40 mg Daily    polyethylene glycol packet 17 g Daily    pramipexole tablet 1 mg QHS    prochlorperazine tablet 10 mg TID PRN    sodium chloride 0.9% flush 3 mL Q8H       Objective:     Vital Signs  (Most Recent):  Temp: 97.4 °F (36.3 °C) (01/17/18 0700)  Pulse: 74 (01/17/18 1013)  Resp: (!) 23 (01/17/18 1013)  BP: (!) 87/47 (01/17/18 1017)  SpO2: (!) 93 % (01/17/18 1013)  O2 Device (Oxygen Therapy): High Flow nasal Cannula (01/17/18 1000) Vital Signs (24h Range):  Temp:  [97.4 °F (36.3 °C)-99 °F (37.2 °C)] 97.4 °F (36.3 °C)  Pulse:  [55-82] 74  Resp:  [13-50] 23  SpO2:  [81 %-100 %] 93 %  BP: ()/(28-83) 87/47     Weight: 49 kg (108 lb 0.4 oz) (01/14/18 0405)  Body mass index is 21.1 kg/m².  Body surface area is 1.44 meters squared.    I/O last 3 completed shifts:  In: 6655.3 [P.O.:1200; I.V.:5205.3; IV Piggyback:250]  Out: 5707 [Other:5707]    Physical Exam   Constitutional: She is oriented to person, place, and time. She appears well-developed and well-nourished. No distress.   HENT:   Head: Normocephalic and atraumatic.   Eyes: Conjunctivae and EOM are normal.   Cardiovascular: Normal rate and regular rhythm.    Pulmonary/Chest: Effort normal. She has rales.   Abdominal: Soft. She exhibits distension.   Musculoskeletal: She exhibits edema. She exhibits no deformity.   Neurological: She is alert and oriented to person, place, and time.   Skin: Skin is warm and dry. She is not diaphoretic.       Significant Labs:  CBC:   Recent Labs  Lab 01/17/18  0359   WBC 6.93   RBC 3.29*   HGB 9.7*   HCT 31.9*   PLT 85*   MCV 97   MCH 29.5   MCHC 30.4*     CMP:   Recent Labs  Lab 01/17/18  0359     108  108   CALCIUM 7.6*  7.6*  7.6*   ALBUMIN 2.6*  2.6*   PROT 6.6     142  142   K 4.1  4.1  4.1   CO2 22*  22*  22*     107  107   BUN 12  12  12   CREATININE 1.9*  1.9*  1.9*   ALKPHOS 101   ALT 21   AST 63*   BILITOT 0.4     All labs within the past 24 hours have been reviewed.     Significant Imaging:  Labs: Reviewed  X-Ray: Reviewed

## 2018-01-18 NOTE — PHYSICIAN QUERY
PT Name: Althea Hsieh  MR #: [id number]    Physician Query Form - Respiratory Condition Clarification      CDS/: Shawanda Hinson RN, CCDS             Contact information: [email or web address]    This form is a permanent document in the medical record.    Query Date: January 18, 2018    By submitting this query, we are merely seeking further clarification of documentation. Please utilize your independent clinical judgment when addressing the question(s) below.    The Medical record contains the following:   Indicators   Supporting Clinical Findings Location in Medical Record   X Pulmonary Edema documented Pulmonary edema    Repeat CXR this PM with pulmonary edema. She continues to require 8L HFNC. 1/15, 1/16 , 1/17 prog notes    1/16 prog note   X Wheezing, Productive cough, SOB, WORLEY, Use of accessory muscles documented  SOB, and new O2 requirement 1/14 nephro note    Radiology Findings     X Hypoxia , Hypoxemia Hypoxic documented Hypoxia- needs urgent dialysis 1/14 consult     Respiratory Distress documented     X RR                  PaO2                  PaCO2                     O2 sat on 5-8L NC with sats 88-95% 1/14 nurse plan of care    Treatment:     X Supplemental O2: Increased O2 requirements 5-6 L high flow.    had increase in O2 requirements (up to 12 L 1/14 nurse plan of care      1/17 prog note   X Oxygen dependence states she had not had dialysis in 3 days and was requiring her PRN O2  1/14 ed note   X Other: ESRD patient admitted with chest pressure. Consulted for HD for volume overload. 1/14 nephro note       Provider, please specify diagnosis or diagnoses associated with above clinical findings.      Pulmonary Edema (please specify acuity and type)   Acuity    Type   [ ] Acute  [ ] Cardiac (Specify cause) ____________________________   [ ] Chronic   [ ] Non Cardiac (Specify cause) ________________________   [ x] Acute on Chronic [ ] Unspecified    [ ] Other Respiratory  Diagnosis (please specify): _________________________________  [ ] Clinically Undetermined    Please document in your progress notes daily for the duration of treatment, until resolved, and include in your discharge summary.

## 2018-01-18 NOTE — PLAN OF CARE
Problem: Physical Therapy Goal  Goal: Physical Therapy Goal  Goals to be met by: 2018    Patient will increase functional independence with mobility by performin. Supine to sit with [place] - not met  2. Sit to stand transfer with [place] - not met  3. Bed to chair transfer with Modified [place] using Rolling Walker - not met  4. Gait  x 200 feet with Modified [place] using Rolling Walker. - not met    Outcome: Ongoing (interventions implemented as appropriate)  Eval completed and goals appropriate.

## 2018-01-18 NOTE — PT/OT/SLP EVAL
Occupational Therapy   Evaluation    Name: Althea Hsieh  MRN: [id number]  Admitting Diagnosis:  Unstable angina 3 Days Post-Op    Recommendations:     Discharge Recommendations: home with home health    History:     Occupational Profile:  Living Environment: Pt lives with spouse in one story home with no steps to enter   Previous level of function: Pt reports she was independent PTA. Pt reports he uses his RW for community use only.  Equipment Owned:  walker, rolling  Assistance upon Discharge: spouse available to assist as needed.     Past Medical History:   Diagnosis Date    Aneurysm of cavernous portion of left internal carotid artery     Anticoagulant long-term use     Anxiety associated with depression     CHF (congestive heart failure)     COPD (chronic obstructive pulmonary disease)     Encounter for blood transfusion     ESRD (end stage renal disease) on dialysis     Hyperparathyroidism, secondary renal     Hypertension     Myocardial infarct     age [age] years     PLD (polycystic liver disease)     Polycystic kidney disease, autosomal dominant     Thyroid disease        Past Surgical History:   Procedure Laterality Date    CARDIAC SURGERY       SECTION, LOW TRANSVERSE  ,    CORONARY ANGIOPLASTY WITH STENT PLACEMENT      Has 2  stents    CORONARY ARTERY BYPASS GRAFT      DIALYSIS FISTULA CREATION      FEMORAL BYPASS      HYSTERECTOMY         Subjective       Communicated with: nsg prior to session.  Pain/Comfort:  · Pain Rating 1: 9/10  · Location 1: back  · Pain Addressed 1: Reposition, Distraction, Nurse notified    Objective:     Patient found supine in bed with CRRT right IJ. Pt without pressors at this time and approved for EOB activity as tolerated.       General Precautions: Standard, fall       Occupational Performance:    Bed Mobility:    · Patient completed Supine to Sit with stand by assistance  · Patient completed Sit to Supine with stand by  "assistance    Activities of Daily Living:  · Grooming: stand by assistance    · UB Dressing: maximal assistance    · LB Dressing: total assistance      Cognitive/Visual Perceptual:  Pt alert and oriented and following commands. Pt with appropriate mood/affect.     Physical Exam:  Pt is right hand dominant and demo WFL UE strength, ROM, coordination and sensation.   Pt with bruised thin skin with HD fistula noted left arm.  Pt with many bandages on UE's.     Patient left supine with all lines intact, call button in reach and nsg present    [facility] 6 Click:  [facility] Total Score: 10    Treatment & Education:  Pt tolerated sitting EOB x 10 min with Fair sitting balance. Vital signs remained stable, but pt reports minimal dizziness while EOB. OT provided education to pt re: safety with functional mobility and ADL skills, OT POC and pt verb understanding.   Education:    Assessment:     Althea Hsieh is a 62 y.o. female with a medical diagnosis of Unstable angina.   Performance deficits affecting function are weakness, impaired functional mobilty, gait instability, impaired endurance, impaired balance, impaired self care skills.  Pt tolerated session well with good effort. Pt is eager for d/c home with spouse.   Limited mobility/ADL assessment this date due to CRRT.     Rehab Prognosis:  Good ; patient would benefit from acute skilled OT services to address these deficits and reach maximum level of function.         Clinical Decision Makin.  OT Mod:  "Pt evaluation falls under moderate complexity for evaluation coding due to identification of 3-5 performance deficits noted as stated above. Eval required Min/Mod assistance to complete on this date and detailed assessment(s) were utilized. Moreover, an expanded review of history and occupational profile obtained with additional review of cognitive, physical and psychosocial hx."     Plan:     Patient to be seen 4 x/week to address the above listed problems via " self-care/home management, therapeutic activities, therapeutic exercises  · Plan of Care Expires:    · Plan of Care Reviewed with: patient    This Plan of care has been discussed with the patient who was involved in its development and understands and is in agreement with the identified goals and treatment plan    GOALS:    Occupational Therapy Goals        Problem: Occupational Therapy Goal    Goal Priority Disciplines Outcome Interventions   Occupational Therapy Goal     OT, PT/OT     Description:  Goals to be met by:  7 days 1/25/18     Patient will increase functional independence with ADLs by performing:    UE Dressing with Supervision.  LE Dressing with Supervision.  Grooming while seated with Supervision.  Toileting from bedside commode with Supervision for hygiene and clothing management.   Stand pivot transfers with Supervision.  Toilet transfer to toilet with Supervision.                      Time Tracking:     OT Date of Treatment: 01/18/18  OT Start Time: 0900  OT Stop Time: 0925  OT Total Time (min): 25 min    Billable Minutes:Evaluation 25    ALISE Zavala  1/18/2018

## 2018-01-18 NOTE — PLAN OF CARE
Problem: Occupational Therapy Goal  Goal: Occupational Therapy Goal  Goals to be met by:  7 days 1/25/18     Patient will increase functional independence with ADLs by performing:    UE Dressing with Supervision.  LE Dressing with Supervision.  Grooming while seated with Supervision.  Toileting from bedside commode with Supervision for hygiene and clothing management.   Stand pivot transfers with Supervision.  Toilet transfer to toilet with Supervision.    Goals and POC established today

## 2018-01-18 NOTE — PROGRESS NOTES
[facility]  Cardiology  Progress Note    Patient Name: Althea Hsieh  MRN: [id number]  Admission Date: 1/14/2018  Hospital Length of Stay: 4 days  Code Status: Full Code   Attending Physician: Ainsley More MD   Primary Care Physician: Davis Syed MD  Expected Discharge Date: 1/19/2018  Principal Problem:Unstable angina    Subjective:     Hospital Course:   1/14: Admitted to CCU for ACS, volume overload, and blood loss anemia. Nephrology consulted for SLED  1/15: SLED overnight. Required low dose levophed during dialysis. Troponins continued to rise (17>25>38) but flattened at 38. Interventional cardiology consulted for C. Patient remained chest pain free. Still requiring 8 L of O2. Started on antibiotics for LLL pneumonia seen on CTA.    Interval History:  No acute overnight events.  Off Levophed since 2am.    Review of Systems   All other systems reviewed and are negative.    Objective:     Vital Signs (Most Recent):  Temp: 97.7 °F (36.5 °C) (01/18/18 1100)  Pulse: 60 (01/18/18 1300)  Resp: (!) 30 (01/18/18 1300)  BP: (!) 105/52 (01/18/18 1300)  SpO2: 98 % (01/18/18 1300) Vital Signs (24h Range):  Temp:  [94.8 °F (34.9 °C)-97.7 °F (36.5 °C)] 97.7 °F (36.5 °C)  Pulse:  [55-93] 60  Resp:  [13-35] 30  SpO2:  [93 %-99 %] 98 %  BP: ()/(34-66) 105/52     Weight: 52 kg (114 lb 10.2 oz)  Body mass index is 22.39 kg/m².     SpO2: 98 %  O2 Device (Oxygen Therapy): High Flow nasal Cannula      Intake/Output Summary (Last 24 hours) at 01/18/18 1318  Last data filed at 01/18/18 1200   Gross per 24 hour   Intake          5782.85 ml   Output             8241 ml   Net         -2458.15 ml       Lines/Drains/Airways     Central Venous Catheter Line                 Trialysis (Dialysis) Catheter 01/14/18 1346 right internal jugular 3 days          Drain                 Hemodialysis AV Graft Left upper arm -- days         Hemodialysis AV Fistula 01/14/18 0849 Left upper arm 4 days                 Physical Exam   Constitutional: She is oriented to person, place, and time. She appears well-developed.   HENT:   Head: Normocephalic and atraumatic.   Neck: JVD present.   8cm-10cm   Cardiovascular: Normal rate and regular rhythm.    Pulmonary/Chest: Effort normal. She has rales.   Decreased breath sounds at bases    Abdominal: Soft.   Musculoskeletal: She exhibits no edema.   Neurological: She is alert and oriented to person, place, and time.   Skin: Skin is warm.   Psychiatric: She has a normal mood and affect.       Significant Labs: All pertinent lab results from the last 24 hours have been reviewed.      Assessment and Plan:         * Unstable angina    Long hx of CAD with PCI in 1977 and 1980 and CABG x 3 in 6/2016. Still smokes. Has had CP for 3-4 months. No nitro as has not been that bad. Had recent fall with bleeding and drop in hemoglobin to 7 from 9 with increasing chest pain. ST changes in V1 improving now isolated to one lead and had some changes in this lead prior on EKG from 11/2017. She also currently has NO chest heaviness or symptoms of angina - thus this does not appear to be a STEMI. ST depressions in inferior and lateral leads improving. This is most likely demand ischemia in the setting of underlying severe CAD, new anemia, COPD/hypoxia.    - Echo shows EF 30-35% with severe left atrial enlargement. The inferior wall and apex are akinetic with global hypokinesis  - Holding off on heparin gtt due to high bleeding risk  - Continue ASA/plavix  - Received 4 units pRBC 1/14/18 . Hgb stable at 10  - Troponins are decreasing (38->23). Patient is chest-pain free  -Will c/w medical management          Hypophosphatemia    - could be contributing to muscle weakness/pain   - replace IV and po supplementation        Chronic back pain    - resume home oxycodone IR 30 mg prn   - can give flexeril as needed        RLS (restless legs syndrome)    -continue home pramipexole         Community acquired  pneumonia    - CTA chest showed abnormal acute airspace disease left lower lobe suspicious for pneumonia  - Though patient is afebrile and without productive cough, still requiring up to 7 L O2  - Continue  IV ceftriaxone x 5 days   -On doxycycline for atypical coverage         Skin tear of forearm without complication    Wound care consulted        ACS (acute coronary syndrome)    See unstable angina         Thrombocytopenia    - likely due to chronic liver disease (polycystic liver?)  - continue to follow        Cellulitis    Erythema and warmth noted on left anterior shin  Area outlined with marker  Start doxycycline 100 mg Q12        PAD S/P femoral-popliteal bypass surgery    - No current pain or non healing ulcers at present.  - Continue ASA/Plavix  - Hold cilostazol in the setting of heart failure exacerbation         HTN (hypertension)    - Holding anti-hypertensives as BP low during dialysis        Tobacco abuse    Smoking cessation discussed         ESRD on dialysis    CRRT for now, goal is to transition to HD tomorrow. If tolerating HD will step down to floors.          CAD (coronary artery disease)    Hx of PCI and CABG x 3. Will continue home medications.             VTE Risk Mitigation         Ordered     Low Risk of VTE  Once      01/14/18 0320          Jurgen Olson MD  Cardiology  [facility]

## 2018-01-18 NOTE — PHYSICIAN QUERY
"PT Name: Althea Hsieh  MR #: [id number]    Physician Query Form - Heart  Condition Clarification     CDS/: Shawanda Hinson RN, CCDS               Contact information: [email or web address]  This form is a permanent document in the medical record.     Query Date: January 18, 2018    By submitting this query, we are merely seeking further clarification of documentation. Please utilize your independent clinical judgment when addressing the question(s) below.    The medical record contains the following   Indicators     Supporting Clinical Findings Location in Medical Record    BNP     X EF (EF 30-35%).  1/14 echo   X Radiology findings CHF with interstitial edema. 1/14 cxr   X Echo Results CONCLUSIONS     1 - Eccentric hypertrophy.     2 - Moderately depressed left ventricular systolic function (EF 30-35%).     3 - Right ventricular enlargement with mildly to moderately depressed systolic function.     4 - Severe left atrial enlargement.     5 - Increased central venous pressure.     6 - Doppler if indicated.  1/14 echo    "Ascites" documented     X "SOB" or "WORLEY" documented shortness of breath and chest heaviness - worse with exertion 1/14 prog note   X "Hypoxia" documented  COPD/hypoxia 1/14 prog note   X Heart Failure documented History: CHF (congestive heart failure) 1/14 consult    X "Edema" documented JVD to jaw 1/14 h/p    Diuretics/Meds      Treatment:     X Other:  ESRD patient admitted with chest pressure. Consulted for HD for volume overload.    increased O2 needs and diffuse fine and course crackles to bilateral mid and lower lung fields  1/14 nephro note        1/14 nurse notes       Provider, please specify diagnosis or diagnoses associated with above clinical findings.                               [ x ] Acute Systolic Heart Failure ( EF < 40)*  [  ] Acute on Chronic Systolic Heart Failure ( EF < 40)*  [  ] Chronic Systolic Heart Failure (EF < 40)*  [  ] Acute Combined Systolic and Diastolic Heart " Failure  [  ] Acute on Chronic Combined Systolic and Diastolic Heart Failure  [  ] Chronic Combined Systolic and Diastolic Heart Failure  [  ] Other Type of Heart Failure (please specify type): _________________________  [  ] Heart Failure Ruled Out  [  ] Other (please specify): ___________________________________  [  ] Clinically Undetermined            *American Heart Association                                                                                                          Please document in your progress notes daily for the duration of treatment until resolved and include in your discharge summary.

## 2018-01-18 NOTE — PT/OT/SLP EVAL
"Physical Therapy Evaluation    Patient Name:  Althea Hsieh   MRN:  [id number]  Co-eval with OT  Recommendations:     Discharge Recommendations:  home with home health   Discharge Equipment Recommendations: none   Barriers to discharge: None    Assessment:     Althea Hsieh is a 62 y.o. female admitted with a medical diagnosis of Unstable angina.  She presents with the following impairments/functional limitations:  weakness, impaired functional mobilty, impaired endurance, impaired balance, impaired self care skills. Pt requires SBA for bed mobility at this time. Pt would benefit from HH upon d/c to maximize functional mobility and ensure safety.     Rehab Prognosis:  good; patient would benefit from acute skilled PT services to address these deficits and reach maximum level of function.      Recent Surgery: Procedure(s) (LRB):  Left heart cath (Left) 3 Days Post-Op    Plan:     During this hospitalization, patient to be seen 4 x/week to address the above listed problems via gait training, therapeutic activities, therapeutic exercises, neuromuscular re-education  · Plan of Care Expires:  02/16/18   Plan of Care Reviewed with: patient    Subjective     Communicated with RN prior to session.  Patient found in bed upon PT entry to room, agreeable to evaluation.      Chief Complaint: pain  Patient comments/goals: to get better and return home   Pain/Comfort:  · Pain Rating 1: 9/10 ("broken ribs")  · Pain Addressed 1: Reposition, Distraction, Nurse notified  · Pain Rating Post-Intervention 1: 9/10    Patients cultural, spiritual, [employer]ec[employer]alism conflicts given the current situation: none reported     Living Environment:  Pt lives with  in [facility] with no DONNA to enter.   Prior to admission, patients level of function was mod indep with ambulation (using RW for community distances) and indep with ADL's.  Patient has the following equipment: home O2, home dialysis, blood spinner, walker, rolling.  DME owned (not " currently used): none.  Upon discharge, patient will have assistance from .    Objective:     Patient found with: telemetry, pulse ox (continuous), blood pressure cuff, CRRT    General Precautions: Standard, fall   Orthopedic Precautions:N/A   Braces: N/A     Exams:  · Sensation:    · -       Intact; B LE light touch   · RLE ROM: WFL  · RLE Strength: WFL  · LLE ROM: WFL  · LLE Strength: WFL    Functional Mobility:  · Bed Mobility:     · Scooting: stand by assistance  · Supine to Sit: stand by assistance  · Sit to Supine: stand by assistance  · Transfers:  Not performed  · Gait: not performed   · Balance:   · Static sit: supervision  · Dynamic sit: supervision     AM-PAC 6 CLICK MOBILITY  Total Score:18       Therapeutic Activities and Exercises:  Educated pt on PT role/POC  Educated pt on importance of OOB activity  Pt verbalized understanding     Patient left HOB elevated with all lines intact, call button in reach and RN notified.    GOALS:    Physical Therapy Goals        Problem: Physical Therapy Goal    Goal Priority Disciplines Outcome Goal Variances Interventions   Physical Therapy Goal     PT/OT, PT Ongoing (interventions implemented as appropriate)     Description:  Goals to be met by: 2018    Patient will increase functional independence with mobility by performin. Supine to sit with [place] - not met  2. Sit to stand transfer with [place] - not met  3. Bed to chair transfer with Modified [place] using Rolling Walker - not met  4. Gait  x 200 feet with Modified [place] using Rolling Walker. - not met                      History:     Past Medical History:   Diagnosis Date    Aneurysm of cavernous portion of left internal carotid artery     Anticoagulant long-term use     Anxiety associated with depression     CHF (congestive heart failure)     COPD (chronic obstructive pulmonary disease)     Encounter for blood transfusion     ESRD (end stage renal disease) on  dialysis     Hyperparathyroidism, secondary renal     Hypertension     Myocardial infarct     age 40,41 years     PLD (polycystic liver disease)     Polycystic kidney disease, autosomal dominant     Thyroid disease        Past Surgical History:   Procedure Laterality Date    CARDIAC SURGERY       SECTION, LOW TRANSVERSE  ,    CORONARY ANGIOPLASTY WITH STENT PLACEMENT      Has 2  stents    CORONARY ARTERY BYPASS GRAFT      DIALYSIS FISTULA CREATION      FEMORAL BYPASS      HYSTERECTOMY         Time Tracking:     PT Received On: 18  PT Start Time: 0858     PT Stop Time: 0915  PT Total Time (min): 17 min     Billable Minutes: Evaluation 17      Ifeoma Munoz, PT, DPT  2018  [phone]

## 2018-01-18 NOTE — ASSESSMENT & PLAN NOTE
- CTA chest showed abnormal acute airspace disease left lower lobe suspicious for pneumonia  - Though patient is afebrile and without productive cough, still requiring up to 7 L O2  - Continue  IV ceftriaxone x 5 days   -On doxycycline for atypical coverage

## 2018-01-18 NOTE — PLAN OF CARE
Problem: Patient Care Overview  Goal: Plan of Care Review  Outcome: Ongoing (interventions implemented as appropriate)  Pt remains on CRRT in ICU, tolerated SCUF well with UF at 450. Pt complained of nausea & abd cramping today, given PRN zofran and GI cocktail. Pt remains off pressors at this time. Pt hypothermic towards end of day, bare hugger applied all day. No other changed today, will continue to monitor

## 2018-01-18 NOTE — SUBJECTIVE & OBJECTIVE
Interval History:   Levophed started yesterday morning to help tolerate UF. No events overnight. Remains on SCUF with UF 450cc/hr. FiO2 improved from 10L HFNC to 6L HFNC. Patient doing okay this morning. Feels better. Notices her legs are less swollen. Cold; requiring sammie hugger. Also reports her fingers are cold.   Net negative 2L yesterday.     Review of patient's allergies indicates:   Allergen Reactions    Sulfamethoxazole-trimethoprim Other (See Comments)     Pt is unsure if she is actually allergic to the medicine.      Current Facility-Administered Medications   Medication Frequency    0.9%  NaCl infusion (CRRT USE ONLY) Continuous    albuterol inhaler 2 puff Q6H PRN    ALPRAZolam tablet 1 mg TID PRN    amiodarone tablet 200 mg Daily    aspirin EC tablet 81 mg Daily    atorvastatin tablet 80 mg Daily    calcitRIOL capsule 0.25 mcg Daily    cefTRIAXone injection 1 g Q24H    clopidogrel tablet 75 mg Daily    cyclobenzaprine tablet 5 mg TID PRN    dextromethorphan-guaifenesin  mg per 12 hr tablet 1 tablet BID    doxycycline tablet 100 mg Q12H    escitalopram oxalate tablet 20 mg Daily    fluconazole tablet 100 mg Daily    levOCARNitine capsule 250 mg Daily    levothyroxine tablet 125 mcg Before breakfast    lisinopril tablet 2.5 mg Daily    magnesium sulfate 2g in water 50mL IVPB (premix) PRN    nitroGLYCERIN SL tablet 0.4 mg Q5 Min PRN    oxyCODONE immediate release tablet 30 mg Q6H PRN    pantoprazole EC tablet 40 mg Daily    polyethylene glycol packet 17 g Daily    pramipexole tablet 1 mg QHS    prochlorperazine tablet 10 mg TID PRN    sodium chloride 0.9% flush 3 mL Q8H       Objective:     Vital Signs (Most Recent):  Temp: 97.7 °F (36.5 °C) (01/18/18 1100)  Pulse: (!) 57 (01/18/18 1400)  Resp: (!) 44 (01/18/18 1400)  BP: (!) 96/55 (01/18/18 1400)  SpO2: 100 % (01/18/18 1400)  O2 Device (Oxygen Therapy): High Flow nasal Cannula (01/18/18 0754) Vital Signs (24h Range):  Temp:   [94.8 °F (34.9 °C)-97.7 °F (36.5 °C)] 97.7 °F (36.5 °C)  Pulse:  [57-93] 57  Resp:  [13-44] 44  SpO2:  [93 %-100 %] 100 %  BP: ()/(34-66) 96/55     Weight: 52 kg (114 lb 10.2 oz) (01/18/18 0336)  Body mass index is 22.39 kg/m².  Body surface area is 1.48 meters squared.    I/O last 3 completed shifts:  In: 8048.7 [P.O.:1140; I.V.:6908.7]  Out: 9560 [Other:9560]    Physical Exam   Constitutional: She is oriented to person, place, and time. She appears well-developed and well-nourished. No distress.   HENT:   Head: Normocephalic and atraumatic.   Eyes: Conjunctivae and EOM are normal.   Cardiovascular: Normal rate and regular rhythm.    Pulmonary/Chest: Effort normal. She has rales (bibasilar; improved).   Abdominal: Soft. She exhibits distension.   Musculoskeletal: She exhibits edema (trace; improved). She exhibits no deformity.   Neurological: She is alert and oriented to person, place, and time.   Skin: Skin is warm and dry. She is not diaphoretic.       Significant Labs:  CBC:   Recent Labs  Lab 01/18/18  0240   WBC 8.46   RBC 3.42*   HGB 10.2*   HCT 33.9*   PLT 90*   MCV 99*   MCH 29.8   MCHC 30.1*     CMP:   Recent Labs  Lab 01/18/18  0240     102  102   CALCIUM 7.7*  7.7*  7.7*   ALBUMIN 2.9*  2.9*   PROT 7.2     141  141   K 4.2  4.2  4.2   CO2 18*  18*  18*     109  109   BUN 19  19  19   CREATININE 2.8*  2.8*  2.8*   ALKPHOS 121   ALT 20   AST 38   BILITOT 0.3     All labs within the past 24 hours have been reviewed.     Significant Imaging:  Labs: Reviewed

## 2018-01-18 NOTE — SUBJECTIVE & OBJECTIVE
Interval History:  No acute overnight events.  Off Levophed since 2am.    Review of Systems   All other systems reviewed and are negative.    Objective:     Vital Signs (Most Recent):  Temp: 97.7 °F (36.5 °C) (01/18/18 1100)  Pulse: 60 (01/18/18 1300)  Resp: (!) 30 (01/18/18 1300)  BP: (!) 105/52 (01/18/18 1300)  SpO2: 98 % (01/18/18 1300) Vital Signs (24h Range):  Temp:  [94.8 °F (34.9 °C)-97.7 °F (36.5 °C)] 97.7 °F (36.5 °C)  Pulse:  [55-93] 60  Resp:  [13-35] 30  SpO2:  [93 %-99 %] 98 %  BP: ()/(34-66) 105/52     Weight: 52 kg (114 lb 10.2 oz)  Body mass index is 22.39 kg/m².     SpO2: 98 %  O2 Device (Oxygen Therapy): High Flow nasal Cannula      Intake/Output Summary (Last 24 hours) at 01/18/18 1318  Last data filed at 01/18/18 1200   Gross per 24 hour   Intake          5782.85 ml   Output             8241 ml   Net         -2458.15 ml       Lines/Drains/Airways     Central Venous Catheter Line                 Trialysis (Dialysis) Catheter 01/14/18 1346 right internal jugular 3 days          Drain                 Hemodialysis AV Graft Left upper arm -- days         Hemodialysis AV Fistula 01/14/18 0849 Left upper arm 4 days                Physical Exam   Constitutional: She is oriented to person, place, and time. She appears well-developed.   HENT:   Head: Normocephalic and atraumatic.   Neck: JVD present.   8cm-10cm   Cardiovascular: Normal rate and regular rhythm.    Pulmonary/Chest: Effort normal. She has rales.   Decreased breath sounds at bases    Abdominal: Soft.   Musculoskeletal: She exhibits no edema.   Neurological: She is alert and oriented to person, place, and time.   Skin: Skin is warm.   Psychiatric: She has a normal mood and affect.       Significant Labs: All pertinent lab results from the last 24 hours have been reviewed.

## 2018-01-18 NOTE — PROGRESS NOTES
Notified Dr. Plascencia of patient temperature of 94.8 currently and placement of warming blanket.  Notified Dr. Plascencia that Levophed infusion is going to the only port that was being used for CVP.  Unable to obtain CVP measurement at this time.  Will continue to monitor.

## 2018-01-18 NOTE — PHYSICIAN QUERY
PT Name: Althea Hsieh  MR #: [id number]     Physician Query Form - Documentation Clarification      CDS/: Shawanda Hinson RN, CCDS              Contact information:[email or web address]    This form is a permanent document in the medical record.     Query Date: January 18, 2018    By submitting this query, we are merely seeking further clarification of documentation. Please utilize your independent clinical judgment when addressing the question(s) below.    The Medical record reflects the following:    Supporting Clinical Findings Location in Medical Record      presents with chest heaviness, shortness of breath, and anemia    most likely demand ischemia in the setting of underlying severe CAD, new anemia, COPD/hypoxia.     Unstable angina, long hx of CAD    NSTEMI/chest pain  Patient with abnormal EKG and elevated troponin now 25.   1/14 prog note       1/14 h/p, 1/14- 1/17 progress notes        1/14 h/p    1/14 consult note     Trop 3.670-->17.066-->25.407-->38.462  38.700-->23.491       1/14-1/15 lab                                                                            Doctor, Please specify diagnosis or diagnoses associated with above clinical findings.    Provider Use Only        (  x )  NSTEMI     (   ) Demand Ischemia 2/2 severe CAD, new anemia, copd/hypoxia    (   )  Other, please specify_____________                                                                                                                       [  ] Clinically undetermined

## 2018-01-18 NOTE — ASSESSMENT & PLAN NOTE
Long hx of CAD with PCI in 1977 and 1980 and CABG x 3 in 6/2016. Still smokes. Has had CP for 3-4 months. No nitro as has not been that bad. Had recent fall with bleeding and drop in hemoglobin to 7 from 9 with increasing chest pain. ST changes in V1 improving now isolated to one lead and had some changes in this lead prior on EKG from 11/2017. She also currently has NO chest heaviness or symptoms of angina - thus this does not appear to be a STEMI. ST depressions in inferior and lateral leads improving. This is most likely demand ischemia in the setting of underlying severe CAD, new anemia, COPD/hypoxia.    - Echo shows EF 30-35% with severe left atrial enlargement. The inferior wall and apex are akinetic with global hypokinesis  - Holding off on heparin gtt due to high bleeding risk  - Continue ASA/plavix  - Received 4 units pRBC 1/14/18 . Hgb stable at 10  - Troponins are decreasing (38->23). Patient is chest-pain free  -Will c/w medical management

## 2018-01-18 NOTE — PROGRESS NOTES
[facility]  Nephrology  Progress Note    Patient Name: Althea Hsieh  MRN: [id number]  Admission Date: 1/14/2018  Hospital Length of Stay: 4 days  Attending Provider: Ainsley More MD   Primary Care Physician: Davis Syed MD  Principal Problem:Unstable angina    Subjective:     HPI: Ms. Hsieh is a 61 yo WF with CAD s/p CABG 2016, PVD, COPD, ADPKD with renal and liver involvement, and ESRD on home HD who presented to an OSH on 1/14 with chest pressure, SOB, and new O2 requirement. She recently had a fall which resulted in L rib fractures and bleeding from both arms. She received 1 unit pRBC at OSH and was transferred to [facility] for further care. Upon further chart review she has been dealing with anemia of unknown source for some time. She receives Micera outpatient. She has been sent to GI for colonoscopy but never went. She [facility] Each session is 2 hours 23 minutes. EDW 48kg. She no longer makes urine. She has low BP at baseline and takes midodrine with HD. She hasn't performed HD since Friday. She recent recent kidney cyst rupture which resulted in mild fever and hematuria; symptoms have since resolved. She currently denies any chest pressure. Troponin has trended from 0.15 to 3.67 and now 25.4. SBP 70-90s. She is developing worsening hypoxia on 5L NC. Nephrology consulted for urgent HD for volume overload. Primary team placing trialysis catheter now. Consent for dialysis obtained by patient and placed in chart.     Interval History:   Levophed started yesterday morning to help tolerate UF. No events overnight. Remains on SCUF with UF 450cc/hr. FiO2 improved from 10L HFNC to 6L HFNC. Patient doing okay this morning. Feels better. Notices her legs are less swollen. Cold; requiring sammie hugger. Also reports her fingers are cold.   Net negative 2L yesterday.     Review of patient's allergies indicates:   Allergen Reactions     Sulfamethoxazole-trimethoprim Other (See Comments)     Pt is unsure if she is actually allergic to the medicine.      Current Facility-Administered Medications   Medication Frequency    0.9%  NaCl infusion (CRRT USE ONLY) Continuous    albuterol inhaler 2 puff Q6H PRN    ALPRAZolam tablet 1 mg TID PRN    amiodarone tablet 200 mg Daily    aspirin EC tablet 81 mg Daily    atorvastatin tablet 80 mg Daily    calcitRIOL capsule 0.25 mcg Daily    cefTRIAXone injection 1 g Q24H    clopidogrel tablet 75 mg Daily    cyclobenzaprine tablet 5 mg TID PRN    dextromethorphan-guaifenesin  mg per 12 hr tablet 1 tablet BID    doxycycline tablet 100 mg Q12H    escitalopram oxalate tablet 20 mg Daily    fluconazole tablet 100 mg Daily    levOCARNitine capsule 250 mg Daily    levothyroxine tablet 125 mcg Before breakfast    lisinopril tablet 2.5 mg Daily    magnesium sulfate 2g in water 50mL IVPB (premix) PRN    nitroGLYCERIN SL tablet 0.4 mg Q5 Min PRN    oxyCODONE immediate release tablet 30 mg Q6H PRN    pantoprazole EC tablet 40 mg Daily    polyethylene glycol packet 17 g Daily    pramipexole tablet 1 mg QHS    prochlorperazine tablet 10 mg TID PRN    sodium chloride 0.9% flush 3 mL Q8H       Objective:     Vital Signs (Most Recent):  Temp: 97.7 °F (36.5 °C) (01/18/18 1100)  Pulse: (!) 57 (01/18/18 1400)  Resp: (!) 44 (01/18/18 1400)  BP: (!) 96/55 (01/18/18 1400)  SpO2: 100 % (01/18/18 1400)  O2 Device (Oxygen Therapy): High Flow nasal Cannula (01/18/18 0754) Vital Signs (24h Range):  Temp:  [94.8 °F (34.9 °C)-97.7 °F (36.5 °C)] 97.7 °F (36.5 °C)  Pulse:  [57-93] 57  Resp:  [13-44] 44  SpO2:  [93 %-100 %] 100 %  BP: ()/(34-66) 96/55     Weight: 52 kg (114 lb 10.2 oz) (01/18/18 0336)  Body mass index is 22.39 kg/m².  Body surface area is 1.48 meters squared.    I/O last 3 completed shifts:  In: 8048.7 [P.O.:1140; I.V.:4708.7]  Out: 9560 [Other:9560]    Physical Exam   Constitutional: She is  oriented to person, place, and time. She appears well-developed and well-nourished. No distress.   HENT:   Head: Normocephalic and atraumatic.   Eyes: Conjunctivae and EOM are normal.   Cardiovascular: Normal rate and regular rhythm.    Pulmonary/Chest: Effort normal. She has rales (bibasilar; improved).   Abdominal: Soft. She exhibits distension.   Musculoskeletal: She exhibits edema (trace; improved). She exhibits no deformity.   Neurological: She is alert and oriented to person, place, and time.   Skin: Skin is warm and dry. She is not diaphoretic.       Significant Labs:  CBC:   Recent Labs  Lab 01/18/18  0240   WBC 8.46   RBC 3.42*   HGB 10.2*   HCT 33.9*   PLT 90*   MCV 99*   MCH 29.8   MCHC 30.1*     CMP:   Recent Labs  Lab 01/18/18  0240     102  102   CALCIUM 7.7*  7.7*  7.7*   ALBUMIN 2.9*  2.9*   PROT 7.2     141  141   K 4.2  4.2  4.2   CO2 18*  18*  18*     109  109   BUN 19  19  19   CREATININE 2.8*  2.8*  2.8*   ALKPHOS 121   ALT 20   AST 38   BILITOT 0.3     All labs within the past 24 hours have been reviewed.     Significant Imaging:  Labs: Reviewed    Assessment/Plan:     ESRD on dialysis    - [facility] each treatment is 2 hours 23 minutes. EDW 48kg. No residual renal function  - started on SLED > HD given troponin 38. Troponin improved to 23 with medical management. Asymptomatic.   - remains on CRRT given volume overload/hypoxia and propensity for muscle cramps with rapid fluid removal  - net negative 2L yesterday; FiO2 improved from 10L HFNC to 6L HFNC  - will continue SCUF today; planning to continue CRRT until patient off HFNC and back on NC  - flexeril PRN for dialysis-related muscle cramps        Chronic kidney disease-mineral and bone disorder    - continue calcitriol  - patient takes Auryxia TID w/meals at home; will restart today            Irina Clark, PGY-5  Nephrology Fellow  [facility]  Pager: [phone]    ATTENDING PHYSICIAN ATTESTATION  I have personally interviewed and examined the patient. I thoroughly reviewed the demographic, clinical, laboratorial and imaging information available in medical records. I agree with the assessment and recommendations provided by the subspecialty resident. Dr. Clark was under my supervision.     DIALYSIS NOTE  Patient evaluated while undergoing Slow Low Efficiency Dialysis (SLED) indicated for CLIFFORD and hemodynamic instability. No complications, tolerating session with current UFR. Will continue current support.

## 2018-01-18 NOTE — ASSESSMENT & PLAN NOTE
- performs home hemodialysis via LUE AVF 5 days per week ([facility]); each treatment is 2 hours 23 minutes. EDW 48kg. No residual renal function  - started on SLED > HD given troponin 38. Troponin improved to 23 with medical management. Asymptomatic.   - remains on CRRT given volume overload/hypoxia and propensity for muscle cramps with rapid fluid removal  - net negative 2L yesterday; FiO2 improved from 10L HFNC to 6L HFNC  - will continue SCUF today; planning to continue CRRT until patient off HFNC and back on NC  - flexeril PRN for dialysis-related muscle cramps

## 2018-01-19 PROBLEM — R57.9 SHOCK: Status: ACTIVE | Noted: 2018-01-01

## 2018-01-19 PROBLEM — E87.20 METABOLIC ACIDOSIS: Status: ACTIVE | Noted: 2018-01-01

## 2018-01-19 PROBLEM — J96.22 ACUTE ON CHRONIC RESPIRATORY FAILURE WITH HYPOXIA AND HYPERCAPNIA: Status: ACTIVE | Noted: 2018-01-01

## 2018-01-19 PROBLEM — I21.19 INFERIOR MYOCARDIAL INFARCTION: Status: ACTIVE | Noted: 2018-01-01

## 2018-01-19 PROBLEM — J96.21 ACUTE ON CHRONIC RESPIRATORY FAILURE WITH HYPOXIA AND HYPERCAPNIA: Status: ACTIVE | Noted: 2018-01-01

## 2018-01-19 PROBLEM — I50.41 ACUTE COMBINED SYSTOLIC AND DIASTOLIC HEART FAILURE: Status: ACTIVE | Noted: 2018-01-01

## 2018-01-19 NOTE — PROGRESS NOTES
[facility]  Nephrology  Progress Note    Patient Name: Althea Hsieh  MRN: [id number]  Admission Date: 1/14/2018  Hospital Length of Stay: 5 days  Attending Provider: Bassam Burt MD   Primary Care Physician: Davis Syed MD  Principal Problem:Unstable angina    Subjective:     HPI: Ms. Hsieh is a 61 yo WF with CAD s/p CABG 2016, PVD, COPD, ADPKD with renal and liver involvement, and ESRD on home HD who presented to an OSH on 1/14 with chest pressure, SOB, and new O2 requirement. She recently had a fall which resulted in L rib fractures and bleeding from both arms. She received 1 unit pRBC at OSH and was transferred to [facility] for further care. Upon further chart review she has been dealing with anemia of unknown source for some time. She receives Micera outpatient. She has been sent to GI for colonoscopy but never went. She [facility] Each session is 2 hours 23 minutes. EDW 48kg. She no longer makes urine. She has low BP at baseline and takes midodrine with HD. She hasn't performed HD since Friday. She recent recent kidney cyst rupture which resulted in mild fever and hematuria; symptoms have since resolved. She currently denies any chest pressure. Troponin has trended from 0.15 to 3.67 and now 25.4. SBP 70-90s. She is developing worsening hypoxia on 5L NC. Nephrology consulted for urgent HD for volume overload. Primary team placing trialysis catheter now. Consent for dialysis obtained by patient and placed in chart.     Interval History:   Transitioned from HFNC to NC around 5pm last night. Received SCUF until 8pm. Levophed started after SCUF ended. No events overnight. Net negative 2L yesterday. Doing okay this morning. Still cold. Fingers are still blue (Raynaud's). BLE without edema.     Review of patient's allergies indicates:   Allergen Reactions    Sulfamethoxazole-trimethoprim Other (See Comments)     Pt is unsure if she is actually  allergic to the medicine.      Current Facility-Administered Medications   Medication Frequency    0.9%  NaCl infusion (CRRT USE ONLY) Continuous    albuterol inhaler 2 puff Q6H PRN    ALPRAZolam tablet 1 mg TID PRN    amiodarone tablet 200 mg Daily    aspirin EC tablet 81 mg Daily    atorvastatin tablet 80 mg Daily    calcitRIOL capsule 0.25 mcg Daily    cefTRIAXone injection 1 g Q24H    clopidogrel tablet 75 mg Daily    cyclobenzaprine tablet 5 mg TID PRN    dextromethorphan-guaifenesin  mg per 12 hr tablet 1 tablet BID    doxycycline tablet 100 mg Q12H    escitalopram oxalate tablet 20 mg Daily    fluconazole tablet 100 mg Daily    lanthanum chewable tablet 1,000 mg TID WM    levOCARNitine capsule 250 mg Daily    levothyroxine tablet 125 mcg Before breakfast    magnesium sulfate 2g in water 50mL IVPB (premix) PRN    midodrine tablet 5 mg TID    nitroGLYCERIN SL tablet 0.4 mg Q5 Min PRN    norepinephrine 4 mg in dextrose 5% 250 mL infusion (premix) (titrating) Continuous    ondansetron injection 4 mg Q6H PRN    oxyCODONE immediate release tablet 30 mg Q6H PRN    pantoprazole EC tablet 40 mg Daily    polyethylene glycol packet 17 g Daily    pramipexole tablet 1 mg QHS    prochlorperazine tablet 10 mg TID PRN    sodium bicarbonate 1 mEq/mL (8.4 %) 150 mEq in dextrose 5 % 1,000 mL infusion Continuous    sodium chloride 0.9% flush 3 mL Q8H    vasopressin (PITRESSIN) 0.2 Units/mL in dextrose 5 % 100 mL infusion Continuous       Objective:     Vital Signs (Most Recent):  Temp: 97.6 °F (36.4 °C) (01/19/18 1100)  Pulse: 61 (01/19/18 1600)  Resp: 20 (01/19/18 1600)  BP: 97/60 (01/19/18 1420)  SpO2: (!) 94 % (01/19/18 1600)  O2 Device (Oxygen Therapy): nasal cannula (01/19/18 1500) Vital Signs (24h Range):  Temp:  [97.2 °F (36.2 °C)-98.4 °F (36.9 °C)] 97.6 °F (36.4 °C)  Pulse:  [52-82] 61  Resp:  [18-58] 20  SpO2:  [91 %-100 %] 94 %  BP: ()/(33-79) 97/60  Arterial Line BP:  (120-125)/(38-41) 120/38     Weight: 45.9 kg (101 lb 3.1 oz) (01/19/18 0306)  Body mass index is 19.76 kg/m².  Body surface area is 1.39 meters squared.    I/O last 3 completed shifts:  In: 6710.7 [P.O.:1780; I.V.:4930.7]  Out: 9570 [Other:9570]    Physical Exam   Constitutional: She is oriented to person, place, and time. She appears well-developed and well-nourished. No distress.   HENT:   Head: Normocephalic and atraumatic.   Eyes: Conjunctivae and EOM are normal.   Cardiovascular: Normal rate and regular rhythm.    Pulmonary/Chest: Effort normal. She has rales (bibasilar).   Abdominal: Soft. She exhibits distension.   Musculoskeletal: She exhibits no edema or deformity.   Neurological: She is alert and oriented to person, place, and time.   Skin: Skin is warm and dry. She is not diaphoretic.       Significant Labs:  ABGs:   Recent Labs  Lab 01/19/18  1420   PH 7.188*   PCO2 67.0*   HCO3 25.4   POCSATURATED 54*   BE -3     CBC:   Recent Labs  Lab 01/19/18  1413   WBC 8.78   RBC 3.23*   HGB 9.8*   HCT 31.8*   *   MCV 99*   MCH 30.3   MCHC 30.8*     CMP:   Recent Labs  Lab 01/19/18  0306 01/19/18  1324 01/19/18  1331     105  105 132* 127*   CALCIUM 8.0*  8.0*  8.0* 8.3* 8.1*   ALBUMIN 3.0*  3.0* 2.8*  --    PROT 7.3  --   --      141  141 138 138   K 4.6  4.6  4.6 5.0 5.0   CO2 17*  17*  17* 19* 18*     108  108 107 108   BUN 25*  25*  25* 29* 28*   CREATININE 3.7*  3.7*  3.7* 4.4* 4.4*   ALKPHOS 127  --   --    ALT 19  --   --    AST 25  --   --    BILITOT 0.3  --   --      All labs within the past 24 hours have been reviewed.     Significant Imaging:  Labs: Reviewed    Assessment/Plan:     ESRD on dialysis    - performs home hemodialysis via LUE AVF 5 days per week ([facility]); each treatment is 2 hours 23 minutes. EDW 48kg. No residual renal function  - started on SLED > HD given troponin 38. Troponin improved to 23 with medical management. Asymptomatic. Continued CRRT  until patient weaned from HFNC to NC; around 8pm last night  - now with new pressor requirement. ABG with combined respiratory and metabolic acidosis. Patient received bicarb pushes with resolution of metabolic acidosis and no change in pH. Lactate normal.   - will start SLED for metabolic clearance. 12-hour treatment. -300cc/hr as tolerated  - consider repeating CXR and checking blood cultures        Chronic kidney disease-mineral and bone disorder    - continue calcitriol  - patient takes Auryxia TID w/meals at home; will hold while on CRRT            Irina Clark, PGY-5  Nephrology Fellow  [facility]  Pager: [phone]

## 2018-01-19 NOTE — PLAN OF CARE
Problem: Occupational Therapy Goal  Goal: Occupational Therapy Goal  Goals to be met by:  7 days 1/25/18     Patient will increase functional independence with ADLs by performing:    UE Dressing with Supervision.  LE Dressing with Supervision.  Grooming while seated with Supervision.  Toileting from bedside commode with Supervision for hygiene and clothing management.   Stand pivot transfers with Supervision.  Toilet transfer to toilet with Supervision.     Goals remain appropriate.

## 2018-01-19 NOTE — SUBJECTIVE & OBJECTIVE
Interval History: Had some abd cramping and nausea yesterday. Received GI cocktail which resolved pain. Required levophed overnight up to 0.18. Patient denies fever/chills, SOB, CP, abd pain, nausea, vomiting.    Review of Systems   Constitution: Positive for weakness. Negative for chills and fever.   HENT: Negative for sore throat.    Eyes: Negative for blurred vision.   Cardiovascular: Positive for dyspnea on exertion. Negative for chest pain and palpitations.   Respiratory: Positive for cough. Negative for shortness of breath and wheezing.    Musculoskeletal: Positive for back pain and joint pain.   Gastrointestinal: Positive for bloating. Negative for abdominal pain, constipation, diarrhea, nausea and vomiting.   Neurological: Negative for dizziness, headaches and light-headedness.     Objective:     Vital Signs (Most Recent):  Temp: 97.6 °F (36.4 °C) (01/19/18 1100)  Pulse: 61 (01/19/18 1600)  Resp: 20 (01/19/18 1600)  BP: 97/60 (01/19/18 1420)  SpO2: (!) 94 % (01/19/18 1600) Vital Signs (24h Range):  Temp:  [97.2 °F (36.2 °C)-98.4 °F (36.9 °C)] 97.6 °F (36.4 °C)  Pulse:  [52-82] 61  Resp:  [20-58] 20  SpO2:  [91 %-100 %] 94 %  BP: ()/(33-60) 97/60  Arterial Line BP: (120-125)/(38-41) 120/38     Weight: 45.9 kg (101 lb 3.1 oz)  Body mass index is 19.76 kg/m².     SpO2: (!) 94 %  O2 Device (Oxygen Therapy): nasal cannula      Intake/Output Summary (Last 24 hours) at 01/19/18 1751  Last data filed at 01/19/18 1600   Gross per 24 hour   Intake          1647.73 ml   Output              926 ml   Net           721.73 ml       Lines/Drains/Airways     Central Venous Catheter Line                 Trialysis (Dialysis) Catheter 01/14/18 1346 right internal jugular 5 days          Drain                 Hemodialysis AV Graft Left upper arm -- days         Hemodialysis AV Fistula 01/14/18 0849 Left upper arm 5 days                Physical Exam   Constitutional: She is oriented to person, place, and time. She appears  well-developed and well-nourished.   HENT:   Head: Normocephalic and atraumatic.   Neck: Normal range of motion. Neck supple. JVD (to jaw) present.   Cardiovascular: Normal rate, regular rhythm and normal heart sounds.  Exam reveals no gallop and no friction rub.    No murmur heard.  Pulmonary/Chest: Effort normal.   Difficulty with taking a deep breath due to rib fractures  Some Rales at base of lungs but improved from previous exam    Abdominal: Soft. Bowel sounds are normal. She exhibits distension.   Musculoskeletal: Normal range of motion. She exhibits no edema.   Neurological: She is alert and oriented to person, place, and time.   Skin: Skin is warm and dry.   Erythema noted on left anterior shin. Improved from previous exam       Significant Labs: All pertinent lab results from the last 24 hours have been reviewed.

## 2018-01-19 NOTE — ASSESSMENT & PLAN NOTE
- CTA chest showed abnormal acute airspace disease left lower lobe suspicious for pneumonia  - patient is afebrile and without productive cough. Now down to 3 L   - Finished course of IV ceftriaxone on 1/19/18

## 2018-01-19 NOTE — PLAN OF CARE
Problem: Patient Care Overview  Goal: Plan of Care Review  Outcome: Ongoing (interventions implemented as appropriate)  Patient progressing towards discharge.  Patient had no acute events noted throughout the night at this time.  See Doc Flowsheets for documented results.       Infusions: Levophed - 0.16 mcg/kg/min - goal MAP > 60, SBP >80     Neurological:  Responds to voice.  Opens eyes on command.  Pupils equal and reactive.  Oriented x 4.     Cardiac: Atrial fibrillation Rhythm.  Pulses weak, but palpable in all extremities.  Capillary refill < 3 seconds in all extremities.  Blood pressure dropped after dialysis was stopped.  BP was 70/30 at lowest, but asymptomatic.  Requiring Levophed tonight.    Respiratory: Nasal cannula 3 L.  Coarse breath sounds.     Gastrointestinal: No BM overnight.     Genitourinary: Anuric.  Plan for HD trial later today.     Musculoskeletal: Generalized weakness with stand by assist.     Integumentary/Other: Patient repositioned every 2 hours throughout the night. Bilateral upper extremity bruising and skin tears, covered with gauze and kerlex.  Red discolored areas to bilateral lower extremities. Dressing changed to LUE.     Discussed plan of care with patient and family with verbalization of understanding.  Will continue to monitor.

## 2018-01-19 NOTE — ASSESSMENT & PLAN NOTE
- CRRT with goal is to transition to HD when off pressors  - Now euvolemic. No longer requiring additional volume removal   - When tolerating HD will step down to floor

## 2018-01-19 NOTE — SUBJECTIVE & OBJECTIVE
Interval History:   Transitioned from HFNC to NC around 5pm last night. Received SCUF until 8pm. Levophed started after SCUF ended. No events overnight. Net negative 2L yesterday. Doing okay this morning. Still cold. Fingers are still blue (Raynaud's). BLE without edema.     Review of patient's allergies indicates:   Allergen Reactions    Sulfamethoxazole-trimethoprim Other (See Comments)     Pt is unsure if she is actually allergic to the medicine.      Current Facility-Administered Medications   Medication Frequency    0.9%  NaCl infusion (CRRT USE ONLY) Continuous    albuterol inhaler 2 puff Q6H PRN    ALPRAZolam tablet 1 mg TID PRN    amiodarone tablet 200 mg Daily    aspirin EC tablet 81 mg Daily    atorvastatin tablet 80 mg Daily    calcitRIOL capsule 0.25 mcg Daily    cefTRIAXone injection 1 g Q24H    clopidogrel tablet 75 mg Daily    cyclobenzaprine tablet 5 mg TID PRN    dextromethorphan-guaifenesin  mg per 12 hr tablet 1 tablet BID    doxycycline tablet 100 mg Q12H    escitalopram oxalate tablet 20 mg Daily    fluconazole tablet 100 mg Daily    lanthanum chewable tablet 1,000 mg TID WM    levOCARNitine capsule 250 mg Daily    levothyroxine tablet 125 mcg Before breakfast    magnesium sulfate 2g in water 50mL IVPB (premix) PRN    midodrine tablet 5 mg TID    nitroGLYCERIN SL tablet 0.4 mg Q5 Min PRN    norepinephrine 4 mg in dextrose 5% 250 mL infusion (premix) (titrating) Continuous    ondansetron injection 4 mg Q6H PRN    oxyCODONE immediate release tablet 30 mg Q6H PRN    pantoprazole EC tablet 40 mg Daily    polyethylene glycol packet 17 g Daily    pramipexole tablet 1 mg QHS    prochlorperazine tablet 10 mg TID PRN    sodium bicarbonate 1 mEq/mL (8.4 %) 150 mEq in dextrose 5 % 1,000 mL infusion Continuous    sodium chloride 0.9% flush 3 mL Q8H    vasopressin (PITRESSIN) 0.2 Units/mL in dextrose 5 % 100 mL infusion Continuous       Objective:     Vital Signs (Most  Recent):  Temp: 97.6 °F (36.4 °C) (01/19/18 1100)  Pulse: 61 (01/19/18 1600)  Resp: 20 (01/19/18 1600)  BP: 97/60 (01/19/18 1420)  SpO2: (!) 94 % (01/19/18 1600)  O2 Device (Oxygen Therapy): nasal cannula (01/19/18 1500) Vital Signs (24h Range):  Temp:  [97.2 °F (36.2 °C)-98.4 °F (36.9 °C)] 97.6 °F (36.4 °C)  Pulse:  [52-82] 61  Resp:  [18-58] 20  SpO2:  [91 %-100 %] 94 %  BP: ()/(33-79) 97/60  Arterial Line BP: (120-125)/(38-41) 120/38     Weight: 45.9 kg (101 lb 3.1 oz) (01/19/18 0306)  Body mass index is 19.76 kg/m².  Body surface area is 1.39 meters squared.    I/O last 3 completed shifts:  In: 6710.7 [P.O.:1780; I.V.:4930.7]  Out: 9570 [Other:9570]    Physical Exam   Constitutional: She is oriented to person, place, and time. She appears well-developed and well-nourished. No distress.   HENT:   Head: Normocephalic and atraumatic.   Eyes: Conjunctivae and EOM are normal.   Cardiovascular: Normal rate and regular rhythm.    Pulmonary/Chest: Effort normal. She has rales (bibasilar).   Abdominal: Soft. She exhibits distension.   Musculoskeletal: She exhibits no edema or deformity.   Neurological: She is alert and oriented to person, place, and time.   Skin: Skin is warm and dry. She is not diaphoretic.       Significant Labs:  ABGs:   Recent Labs  Lab 01/19/18  1420   PH 7.188*   PCO2 67.0*   HCO3 25.4   POCSATURATED 54*   BE -3     CBC:   Recent Labs  Lab 01/19/18  1413   WBC 8.78   RBC 3.23*   HGB 9.8*   HCT 31.8*   *   MCV 99*   MCH 30.3   MCHC 30.8*     CMP:   Recent Labs  Lab 01/19/18  0306 01/19/18  1324 01/19/18  1331     105  105 132* 127*   CALCIUM 8.0*  8.0*  8.0* 8.3* 8.1*   ALBUMIN 3.0*  3.0* 2.8*  --    PROT 7.3  --   --      141  141 138 138   K 4.6  4.6  4.6 5.0 5.0   CO2 17*  17*  17* 19* 18*     108  108 107 108   BUN 25*  25*  25* 29* 28*   CREATININE 3.7*  3.7*  3.7* 4.4* 4.4*   ALKPHOS 127  --   --    ALT 19  --   --    AST 25  --   --    BILITOT  0.3  --   --      All labs within the past 24 hours have been reviewed.     Significant Imaging:  Labs: Reviewed

## 2018-01-19 NOTE — PT/OT/SLP PROGRESS
Occupational Therapy   Treatment    Name: Althea Hsieh  MRN: [id number]  Admitting Diagnosis:  Unstable angina  4 Days Post-Op    Recommendations:     Discharge Recommendations: home with home health    Subjective     Communicated with: nsg  prior to session.  Pain/Comfort:  · Pain Rating 1:  (pt reports pain in her back but unable to rate pain. )    Objective:     Patient found supine in bed and agreeable to therapy       General Precautions: Standard, fall   Orthopedic Precautions:N/A     Occupational Performance:    Bed Mobility:    · Patient completed Supine to Sit with minimum assistance  · Patient completed Sit to Supine with minimum assistance   ·   Activities of Daily Living:  · Grooming: stand by assistance    · UB Dressing: total assistance      Patient left supine with all lines intact, call button in reach and nsg present    [facility] 6 Click:  [facility] Total Score: 11    Treatment & Education:  Pt tolerated sitting EOB x 20 min with Fair to Fair+ sitting balance. Pt very talkative and needed cues for redirection. Pt completed LE AROM seated EOB to increase functional ROM/strength.   Standing and t/f to b/s chair deferred this date due to decreased BP. Pt did declined dizziness this date and vital signs remained stable during session.   Education:    Assessment:     Althea Hsieh is a 62 y.o. female with a medical diagnosis of Unstable angina.  She tolerated session fairly well this date. CRRT not running this date but decreased BP limiting functional activity. .  Performance deficits affecting function are weakness, impaired functional mobilty, gait instability, impaired self care skills, impaired endurance.      Rehab Prognosis:  Good ; patient would benefit from acute skilled OT services to address these deficits and reach maximum level of function.       Plan:     Patient to be seen 4 x/week to address the above listed problems via self-care/home management, therapeutic activities, therapeutic  exercises  · Plan of Care Expires:    · Plan of Care Reviewed with: patient    This Plan of care has been discussed with the patient who was involved in its development and understands and is in agreement with the identified goals and treatment plan    GOALS:    Occupational Therapy Goals        Problem: Occupational Therapy Goal    Goal Priority Disciplines Outcome Interventions   Occupational Therapy Goal     OT, PT/OT     Description:  Goals to be met by:  7 days 1/25/18     Patient will increase functional independence with ADLs by performing:    UE Dressing with Supervision.  LE Dressing with Supervision.  Grooming while seated with Supervision.  Toileting from bedside commode with Supervision for hygiene and clothing management.   Stand pivot transfers with Supervision.  Toilet transfer to toilet with Supervision.                      Time Tracking:     OT Date of Treatment: 01/19/18  OT Start Time: 1115  OT Stop Time: 1145  OT Total Time (min): 30 min    Billable Minutes:Therapeutic Activity 30    ALISE Zavala  1/19/2018

## 2018-01-19 NOTE — ASSESSMENT & PLAN NOTE
- performs home hemodialysis via LUE AVF 5 days per week (SuMTTF); each treatment is 2 hours 23 minutes. EDW 48kg. No residual renal function  - started on SLED > HD given troponin 38. Troponin improved to 23 with medical management. Asymptomatic. Continued CRRT until patient weaned from HFNC to NC; around 8pm last night  - now with new pressor requirement. ABG with combined respiratory and metabolic acidosis. Patient received bicarb pushes with resolution of metabolic acidosis and no change in pH  - will start SLED for metabolic clearance. 12-hour treatment. -300cc/hr as tolerated

## 2018-01-20 PROBLEM — E87.20 METABOLIC ACIDOSIS: Status: RESOLVED | Noted: 2018-01-01 | Resolved: 2018-01-01

## 2018-01-20 NOTE — ASSESSMENT & PLAN NOTE
Long hx of CAD with PCI in 1977 and 1980 and CABG x 3 in 6/2016. Still smokes. Here with NSTEMi likely demand ischemia from acute blood loss anemia.    - Echo shows EF 30-35% with severe left atrial enlargement. The inferior wall and apex are akinetic with global hypokinesis  - Holding off on heparin gtt due to high bleeding risk  - Continue ASA/plavix  - Received 4 units pRBC 1/14/18 . Hgb stable at 10  - Troponins are decreasing (38->23). Patient is chest-pain free  -Will c/w medical management  -Holding BB due to shock

## 2018-01-20 NOTE — PROGRESS NOTES
[facility]  Cardiology  Progress Note    Patient Name: Althea Hsieh  MRN: [id number]  Admission Date: 1/14/2018  Hospital Length of Stay: 6 days  Code Status: Full Code   Attending Physician: Bassam Burt MD   Primary Care Physician: Davis Syed MD  Expected Discharge Date: 1/24/2018  Principal Problem:Shock    Subjective:     Hospital Course:   1/14: Admitted to CCU for ACS, volume overload, and blood loss anemia. Nephrology consulted for SLED  1/15: SLED overnight. Required low dose levophed during dialysis. Troponins continued to rise (17>25>38) but flattened at 38. Interventional cardiology consulted for [facility]. Patient remained chest pain free. Still requiring 8 L of O2. Started on antibiotics for LLL pneumonia seen on CTA.    Interval History: Required levophed overnight up to 0.2.  Downtitrating now, A-line shows wide pulse pressures, no previous AI noted on echo and no AI murmur on physical exam     Review of Systems   Constitution: Positive for weakness. Negative for chills and fever.   HENT: Negative for sore throat.    Eyes: Negative for blurred vision.   Cardiovascular: Positive for dyspnea on exertion. Negative for chest pain and palpitations.   Respiratory: Positive for cough. Negative for shortness of breath and wheezing.    Musculoskeletal: Positive for back pain and joint pain.   Gastrointestinal: Positive for bloating. Negative for abdominal pain, constipation, diarrhea, nausea and vomiting.   Neurological: Negative for dizziness, headaches and light-headedness.     Objective:     Vital Signs (Most Recent):  Temp: 99.7 °F (37.6 °C) (01/20/18 0701)  Pulse: 64 (01/20/18 0800)  Resp: (!) 26 (01/19/18 2000)  BP: (!) 63/30 (01/20/18 0700)  SpO2: 98 % (01/20/18 0800) Vital Signs (24h Range):  Temp:  [97.6 °F (36.4 °C)-99.7 °F (37.6 °C)] 99.7 °F (37.6 °C)  Pulse:  [53-68] 64  Resp:  [20-58] 26  SpO2:  [79 %-100 %] 98 %  BP: ()/(30-60) 63/30  Arterial Line BP:  ()/(27-52) 110/27     Weight: 48 kg (105 lb 13.1 oz)  Body mass index is 20.67 kg/m².     SpO2: 98 %  O2 Device (Oxygen Therapy): nasal cannula      Intake/Output Summary (Last 24 hours) at 01/20/18 0848  Last data filed at 01/20/18 0800   Gross per 24 hour   Intake          3244.19 ml   Output             2388 ml   Net           856.19 ml       Lines/Drains/Airways     Central Venous Catheter Line                 Trialysis (Dialysis) Catheter 01/14/18 1346 right internal jugular 5 days          Drain                 Hemodialysis AV Graft Left upper arm -- days         Hemodialysis AV Fistula 01/14/18 0849 Left upper arm 5 days          Arterial Line                 Arterial Line 01/19/18 1600 Right Brachial less than 1 day                Physical Exam   Constitutional: She is oriented to person, place, and time. She appears well-developed and well-nourished.   HENT:   Head: Normocephalic and atraumatic.   Neck: Normal range of motion. Neck supple. JVD (to jaw) present.   Cardiovascular: Normal rate, regular rhythm and normal heart sounds.  Exam reveals no gallop and no friction rub.    No murmur heard.  Pulmonary/Chest: Effort normal.   Difficulty with taking a deep breath due to rib fractures  Some Rales at base of lungs but improved from previous exam    Abdominal: Soft. Bowel sounds are normal. She exhibits distension.   Musculoskeletal: Normal range of motion. She exhibits no edema.   Neurological: She is alert and oriented to person, place, and time.   Skin: Skin is warm and dry.   Erythema noted on left anterior shin. Improved from previous exam       Significant Labs: All pertinent lab results from the last 24 hours have been reviewed.      Assessment and Plan:         * Shock    Shock likely multifactorial, had severe metabolic acidosis superimposed on chronic respiratory acidosis. Unlikely to be cardiogenic shock based on Svo2.  No sign of sepsis, blood cultures are negative and just finished course  of ceftriaxone and remains on doxycycline.    -On Levophed goal is SBP over 90, will downtitrate           Acute on chronic respiratory failure with hypoxia and hypercapnia    - 2/2 acute HF exacerbation as well as severe underlying COPD  - Improving with volume removal via CRRT   - now close to baseline O2 requirement         Acute combined systolic and diastolic heart failure    - patient presented with SOB with bilateral pulmonary edema. Inciting event was likely NSTEMI   - Has been receiving CRRT  -Unable to start BB or ACE-I given hypotension         Hyperphosphatemia    - likely due to renal failure  - 6.4 this AM  - restart home phos binders        Chronic back pain    - resume home oxycodone IR 30 mg prn   - can give flexeril as needed        RLS (restless legs syndrome)    -continue home pramipexole         Community acquired pneumonia    - CTA chest showed abnormal acute airspace disease left lower lobe suspicious for pneumonia  - patient is afebrile and without productive cough. Now down to 3 L   - Finished course of IV ceftriaxone on 1/19/18        Skin tear of forearm without complication    Wound care consulted        Unstable angina    Long hx of CAD with PCI in 1977 and 1980 and CABG x 3 in 6/2016. Still smokes. Here with NSTEMi likely demand ischemia from acute blood loss anemia.    - Echo shows EF 30-35% with severe left atrial enlargement. The inferior wall and apex are akinetic with global hypokinesis  - Holding off on heparin gtt due to high bleeding risk  - Continue ASA/plavix  - Received 4 units pRBC 1/14/18 .   - Troponins are decreasing (38->23). Patient is chest-pain free  -Will c/w medical management  -Holding BB due to shock     Shock in the setting of severe non-gap acidosis yesterday, now improving, will attempt to downtitrate vasopressors. Vasopressor requirements precludes HD for now. EKG remains unchanged, SVO2 remains consistently stable, unlikely to be cardiogenic shock.    Will  continue to manage the shock, completed tx for PNA, no further signs of sepsis, and once able to come off vasopressors will attempt HD.           Inferior myocardial infarction    See unstable angina         Thrombocytopenia    - likely due to chronic liver disease (polycystic liver?)  - continue to follow        Cellulitis    Erythema and warmth noted on left anterior shin  Area outlined with marker. Improved from prior exam   Continue doxycycline 100 mg Q12 x 7 days        PAD S/P femoral-popliteal bypass surgery    - No current pain or non healing ulcers at present.  - Continue ASA/Plavix  - Hold cilostazol in the setting of heart failure exacerbation         Tobacco abuse    Smoking cessation discussed         ESRD on dialysis    - CRRT with goal is to transition to HD when off pressors  - Now euvolemic. No longer requiring additional volume removal   - When tolerating HD will step down to floor          CAD (coronary artery disease)    Hx of PCI and CABG x 3. Will continue home medications.             VTE Risk Mitigation         Ordered     Low Risk of VTE  Once      01/14/18 0320          Jurgen Olson MD  Cardiology  [facility]

## 2018-01-20 NOTE — PROGRESS NOTES
[facility]  Cardiology  Progress Note    Patient Name: Althea Hsieh  MRN: [id number]   Admission Date: 1/14/2018  Hospital Length of Stay: 5 days  Code Status: Full Code   Attending Physician: Bassam Burt MD   Primary Care Physician: Davis Syed MD  Expected Discharge Date: 1/24/2018  Principal Problem:Shock    Subjective:     Hospital Course:   1/14: Admitted to CCU for ACS, volume overload, and blood loss anemia. Nephrology consulted for SLED  1/15: SLED overnight. Required low dose levophed during dialysis. Troponins continued to rise (17>25>38) but flattened at 38. Interventional cardiology consulted for [facility]. Patient remained chest pain free. Still requiring 8 L of O2. Started on antibiotics for LLL pneumonia seen on CTA.    Interval History: Had some abd cramping and nausea yesterday. Received GI cocktail which resolved pain. Required levophed overnight up to 0.18. Patient denies fever/chills, SOB, CP, abd pain, nausea, vomiting.    Review of Systems   Constitution: Positive for weakness. Negative for chills and fever.   HENT: Negative for sore throat.    Eyes: Negative for blurred vision.   Cardiovascular: Positive for dyspnea on exertion. Negative for chest pain and palpitations.   Respiratory: Positive for cough. Negative for shortness of breath and wheezing.    Musculoskeletal: Positive for back pain and joint pain.   Gastrointestinal: Positive for bloating. Negative for abdominal pain, constipation, diarrhea, nausea and vomiting.   Neurological: Negative for dizziness, headaches and light-headedness.     Objective:     Vital Signs (Most Recent):  Temp: 97.6 °F (36.4 °C) (01/19/18 1100)  Pulse: 61 (01/19/18 1600)  Resp: 20 (01/19/18 1600)  BP: 97/60 (01/19/18 1420)  SpO2: (!) 94 % (01/19/18 1600) Vital Signs (24h Range):  Temp:  [97.2 °F (36.2 °C)-98.4 °F (36.9 °C)] 97.6 °F (36.4 °C)  Pulse:  [52-82] 61  Resp:  [20-58] 20  SpO2:  [91 %-100 %] 94 %  BP: ()/(33-60)  97/60  Arterial Line BP: (120-125)/(38-41) 120/38     Weight: 45.9 kg (101 lb 3.1 oz)  Body mass index is 19.76 kg/m².     SpO2: (!) 94 %  O2 Device (Oxygen Therapy): nasal cannula      Intake/Output Summary (Last 24 hours) at 01/19/18 1751  Last data filed at 01/19/18 1600   Gross per 24 hour   Intake          1647.73 ml   Output              926 ml   Net           721.73 ml       Lines/Drains/Airways     Central Venous Catheter Line                 Trialysis (Dialysis) Catheter 01/14/18 1346 right internal jugular 5 days          Drain                 Hemodialysis AV Graft Left upper arm -- days         Hemodialysis AV Fistula 01/14/18 0849 Left upper arm 5 days                Physical Exam   Constitutional: She is oriented to person, place, and time. She appears well-developed and well-nourished.   HENT:   Head: Normocephalic and atraumatic.   Neck: Normal range of motion. Neck supple. JVD (to jaw) present.   Cardiovascular: Normal rate, regular rhythm and normal heart sounds.  Exam reveals no gallop and no friction rub.    No murmur heard.  Pulmonary/Chest: Effort normal.   Difficulty with taking a deep breath due to rib fractures  Some Rales at base of lungs but improved from previous exam    Abdominal: Soft. Bowel sounds are normal. She exhibits distension.   Musculoskeletal: Normal range of motion. She exhibits no edema.   Neurological: She is alert and oriented to person, place, and time.   Skin: Skin is warm and dry.   Erythema noted on left anterior shin. Improved from previous exam       Significant Labs: All pertinent lab results from the last 24 hours have been reviewed.      Assessment and Plan:       * Shock    - likely multifactorial metabolic acidosis and hypovolemia from volume removal via CRRT  - Unlikely to be septic shock but could be cardiogenic. Check lactate, troponin, EKG  - Currently on levophed  - May require additional CRRT for acidosis        Metabolic acidosis    - non-anion gap so  could be RTA  - will likely require additional CRRT for clearance   - Consider bicarb drip         Acute on chronic respiratory failure with hypoxia and hypercapnia    - 2/2 acute HR exacerbation   - Improving with volume removal via CRRT   - now close to baseline O2 requirement         Acute combined systolic and diastolic heart failure    - patient presented with SOB with bilateral pulmonary edema. Inciting event was likely NSTEMI   - Has been receiving CRRT         Hyperphosphatemia    - likely due to renal failure  - 6.4 this AM  - restart home phos binders        Chronic back pain    - resume home oxycodone IR 30 mg prn   - can give flexeril as needed        RLS (restless legs syndrome)    -continue home pramipexole         Community acquired pneumonia    - CTA chest showed abnormal acute airspace disease left lower lobe suspicious for pneumonia  - patient is afebrile and without productive cough. Now down to 3 L   - Finished course of IV ceftriaxone on 1/19/18        Skin tear of forearm without complication    Wound care consulted        Unstable angina    Long hx of CAD with PCI in 1977 and 1980 and CABG x 3 in 6/2016. Still smokes. Has had CP for 3-4 months. No nitro as has not been that bad. Had recent fall with bleeding and drop in hemoglobin to 7 from 9 with increasing chest pain. ST changes in V1 improving now isolated to one lead and had some changes in this lead prior on EKG from 11/2017. She also currently has NO chest heaviness or symptoms of angina - thus this does not appear to be a STEMI. ST depressions in inferior and lateral leads improving. This is most likely demand ischemia in the setting of underlying severe CAD, new anemia, COPD/hypoxia.    - Echo shows EF 30-35% with severe left atrial enlargement. The inferior wall and apex are akinetic with global hypokinesis  - Holding off on heparin gtt due to high bleeding risk  - Continue ASA/plavix  - Received 4 units pRBC 1/14/18 . Hgb stable at  10  - Troponins are decreasing (38->23). Patient is chest-pain free  -Will c/w medical management          Inferior myocardial infarction    See unstable angina         Thrombocytopenia    - likely due to chronic liver disease (polycystic liver?)  - continue to follow        Cellulitis    Erythema and warmth noted on left anterior shin  Area outlined with marker. Improved from prior exam   Continue doxycycline 100 mg Q12 x 7 days        PAD S/P femoral-popliteal bypass surgery    - No current pain or non healing ulcers at present.  - Continue ASA/Plavix  - Hold cilostazol in the setting of heart failure exacerbation         HTN (hypertension)    - Holding anti-hypertensives as patient requiring pressors for shock         Tobacco abuse    Smoking cessation discussed         ESRD on dialysis    - CRRT with goal is to transition to HD when off pressors  - Now euvolemic. No longer requiring additional volume removal   - When tolerating HD will step down to floor          CAD (coronary artery disease)    Hx of PCI and CABG x 3. Will continue home medications.             VTE Risk Mitigation         Ordered     Low Risk of VTE  Once      01/14/18 0320          Chevy Johnston MD  Cardiology  [facility]

## 2018-01-20 NOTE — ASSESSMENT & PLAN NOTE
- likely multifactorial metabolic acidosis and hypovolemia from volume removal via CRRT  - Unlikely to be septic shock but could be cardiogenic. Check lactate, troponin, EKG  - Currently on levophed  - May require additional CRRT for acidosis

## 2018-01-20 NOTE — ASSESSMENT & PLAN NOTE
- patient presented with SOB with bilateral pulmonary edema. Inciting event was likely NSTEMI   - Has been receiving CRRT

## 2018-01-20 NOTE — PLAN OF CARE
Problem: Patient Care Overview  Goal: Plan of Care Review  Outcome: Ongoing (interventions implemented as appropriate)   See vital signs and assessments in flowsheets. See below for updates on today's progress.     Pulmonary: 2L Nc, O2sat >95%.     Cardiovascular: NSR 60's. Levo titrated to maintain a systolic >90 per Md Butr and Whitney request.     Neurological: AAO, t max-99.2    Gastrointestinal: cardiac diet. No BM    Genitourinary: anuric. No CRRT for today    Endocrine: normoglycemic     Integumentary/Other: phos packet given on shift.     Infusions: levo    Patient progressing towards goals as tolerated, plan of care communicated and reviewed with patient and family. All concerns addressed. Will continue to monitor.

## 2018-01-20 NOTE — PLAN OF CARE
Problem: Patient Care Overview  Goal: Plan of Care Review  Outcome: Ongoing (interventions implemented as appropriate)  Plan of care reviewed with patient. LEVO requirements increasing throughout shift. 2 amps bicarb and bicarb drip started. Vaso added then dc'ed. CRRT started. Will continue to monitor. See flowsheet for full documentation.   Goal: Individualization & Mutuality  Outcome: Ongoing (interventions implemented as appropriate)  Past Medical History:   Diagnosis Date    Aneurysm of cavernous portion of left internal carotid artery     Anticoagulant long-term use     Anxiety associated with depression     CHF (congestive heart failure)     COPD (chronic obstructive pulmonary disease)     Encounter for blood transfusion     ESRD (end stage renal disease) on dialysis     Hyperparathyroidism, secondary renal     Hypertension     Myocardial infarct     age 40,41 years     PLD (polycystic liver disease)     Polycystic kidney disease, autosomal dominant     Thyroid disease

## 2018-01-20 NOTE — SUBJECTIVE & OBJECTIVE
Interval History: no new issues    Review of patient's allergies indicates:   Allergen Reactions    Sulfamethoxazole-trimethoprim Other (See Comments)     Pt is unsure if she is actually allergic to the medicine.      Current Facility-Administered Medications   Medication Frequency    0.9%  NaCl infusion (CRRT USE ONLY) Continuous    albuterol inhaler 2 puff Q6H PRN    ALPRAZolam tablet 1 mg TID PRN    amiodarone tablet 200 mg Daily    aspirin EC tablet 81 mg Daily    atorvastatin tablet 80 mg Daily    calcitRIOL capsule 0.25 mcg Daily    clopidogrel tablet 75 mg Daily    cyclobenzaprine tablet 5 mg TID PRN    dextromethorphan-guaifenesin  mg per 12 hr tablet 1 tablet BID    doxycycline tablet 100 mg Q12H    escitalopram oxalate tablet 20 mg Daily    lanthanum chewable tablet 1,000 mg TID WM    levOCARNitine capsule 250 mg Daily    levothyroxine tablet 125 mcg Before breakfast    magnesium sulfate 2g in water 50mL IVPB (premix) PRN    midodrine tablet 5 mg TID    nitroGLYCERIN SL tablet 0.4 mg Q5 Min PRN    norepinephrine 16 mg in dextrose 5 % 250 mL infusion Continuous    ondansetron injection 4 mg Q6H PRN    oxyCODONE immediate release tablet 15 mg Q6H PRN    pantoprazole EC tablet 40 mg Daily    polyethylene glycol packet 17 g Daily    pramipexole tablet 1 mg QHS    prochlorperazine tablet 10 mg TID PRN    sodium chloride 0.9% flush 3 mL Q8H       Objective:     Vital Signs (Most Recent):  Temp: 98.7 °F (37.1 °C) (01/20/18 1100)  Pulse: 66 (01/20/18 1300)  Resp: 18 (01/20/18 0900)  BP: (!) 63/30 (01/20/18 0700)  SpO2: 97 % (01/20/18 1300)  O2 Device (Oxygen Therapy): nasal cannula (01/20/18 1300) Vital Signs (24h Range):  Temp:  [98.7 °F (37.1 °C)-99.7 °F (37.6 °C)] 98.7 °F (37.1 °C)  Pulse:  [61-68] 66  Resp:  [18-58] 18  SpO2:  [79 %-100 %] 97 %  BP: (63-97)/(30-60) 63/30  Arterial Line BP: ()/(24-52) 96/37     Weight: 48 kg (105 lb 13.1 oz) (01/20/18 0400)  Body mass  index is 20.67 kg/m².  Body surface area is 1.43 meters squared.    I/O last 3 completed shifts:  In: 4167.6 [P.O.:600; I.V.:3567.6]  Out: 2586 [Other:2586]    Physical Exam   HENT:   Head: Atraumatic.   Neck: No JVD present.   Cardiovascular: Normal rate and regular rhythm.    Pulmonary/Chest: Effort normal and breath sounds normal.   Abdominal: Soft. She exhibits no distension.   Musculoskeletal: She exhibits no edema or tenderness.   Neurological: She is alert.   Skin: Skin is warm.

## 2018-01-20 NOTE — PROGRESS NOTES
Spoke with MD Burt and bradford. Ordered for levo to be titrated by arterial line to systolic >90. MD made aware of difference between cuff and arterial line blood pressures.

## 2018-01-20 NOTE — ASSESSMENT & PLAN NOTE
- performs home hemodialysis via LUE AVF 5 days per week (SuMTT); each treatment is 2 hours 23 minutes. EDW 48kg. No residual renal function  - started on SLED > HD given troponin 38. Troponin improved to 23 with medical management. Asymptomatic. Continued CRRT until patient weaned from HFNC to NC; around 8pm last night  - relatively stable this morning, tolerated SLED overnight, labs look good, improving respiratory status, hold for now and reassess tomorrow

## 2018-01-20 NOTE — ASSESSMENT & PLAN NOTE
- 2/2 acute HR exacerbation   - Improving with volume removal via CRRT   - now close to baseline O2 requirement

## 2018-01-20 NOTE — ASSESSMENT & PLAN NOTE
- non-anion gap so could be RTA  - will likely require additional CRRT for clearance   - Consider bicarb drip

## 2018-01-20 NOTE — ASSESSMENT & PLAN NOTE
Shock likely multifactorial, had severe metabolic acidosis superimposed on chronic respiratory acidosis. Unlikely to be cardiogenic shock based on Svo2.  No sign of sepsis, blood cultures are negative and just finished course of ceftriaxone and remains on doxycycline.    -On Levophed goal is SBP over 90, will downtitrate

## 2018-01-20 NOTE — ASSESSMENT & PLAN NOTE
- patient presented with SOB with bilateral pulmonary edema. Inciting event was likely NSTEMI   - Has been receiving CRRT  -Unable to start BB or ACE-I given hypotension

## 2018-01-20 NOTE — SUBJECTIVE & OBJECTIVE
Interval History: Required levophed overnight up to 0.2.  Downtitrating now, A-line shows wide pulse pressures, no previous AI noted on echo and no AI murmur on physical exam     Review of Systems   Constitution: Positive for weakness. Negative for chills and fever.   HENT: Negative for sore throat.    Eyes: Negative for blurred vision.   Cardiovascular: Positive for dyspnea on exertion. Negative for chest pain and palpitations.   Respiratory: Positive for cough. Negative for shortness of breath and wheezing.    Musculoskeletal: Positive for back pain and joint pain.   Gastrointestinal: Positive for bloating. Negative for abdominal pain, constipation, diarrhea, nausea and vomiting.   Neurological: Negative for dizziness, headaches and light-headedness.     Objective:     Vital Signs (Most Recent):  Temp: 99.7 °F (37.6 °C) (01/20/18 0701)  Pulse: 64 (01/20/18 0800)  Resp: (!) 26 (01/19/18 2000)  BP: (!) 63/30 (01/20/18 0700)  SpO2: 98 % (01/20/18 0800) Vital Signs (24h Range):  Temp:  [97.6 °F (36.4 °C)-99.7 °F (37.6 °C)] 99.7 °F (37.6 °C)  Pulse:  [53-68] 64  Resp:  [20-58] 26  SpO2:  [79 %-100 %] 98 %  BP: ()/(30-60) 63/30  Arterial Line BP: ()/(27-52) 110/27     Weight: 48 kg (105 lb 13.1 oz)  Body mass index is 20.67 kg/m².     SpO2: 98 %  O2 Device (Oxygen Therapy): nasal cannula      Intake/Output Summary (Last 24 hours) at 01/20/18 0848  Last data filed at 01/20/18 0800   Gross per 24 hour   Intake          3244.19 ml   Output             2388 ml   Net           856.19 ml       Lines/Drains/Airways     Central Venous Catheter Line                 Trialysis (Dialysis) Catheter 01/14/18 1346 right internal jugular 5 days          Drain                 Hemodialysis AV Graft Left upper arm -- days         Hemodialysis AV Fistula 01/14/18 0849 Left upper arm 5 days          Arterial Line                 Arterial Line 01/19/18 1600 Right Brachial less than 1 day                Physical Exam    Constitutional: She is oriented to person, place, and time. She appears well-developed and well-nourished.   HENT:   Head: Normocephalic and atraumatic.   Neck: Normal range of motion. Neck supple. JVD (to jaw) present.   Cardiovascular: Normal rate, regular rhythm and normal heart sounds.  Exam reveals no gallop and no friction rub.    No murmur heard.  Pulmonary/Chest: Effort normal.   Difficulty with taking a deep breath due to rib fractures  Some Rales at base of lungs but improved from previous exam    Abdominal: Soft. Bowel sounds are normal. She exhibits distension.   Musculoskeletal: Normal range of motion. She exhibits no edema.   Neurological: She is alert and oriented to person, place, and time.   Skin: Skin is warm and dry.   Erythema noted on left anterior shin. Improved from previous exam       Significant Labs: All pertinent lab results from the last 24 hours have been reviewed.

## 2018-01-20 NOTE — PLAN OF CARE
Problem: Patient Care Overview  Goal: Plan of Care Review  Outcome: Ongoing (interventions implemented as appropriate)  See flowsheets for assessment details and vital signs. AAO x 4, chronic pain, requests meds when needed. ESRD on home Hd, received CRRT this shift for approx 10 hours before clotting, trialysis catheter remains patent. BP remains low, titrating Levophed to maintain brachial aurora SBP > 115 per cardiology, intermittent NIBP checks to right popliteal to correlate. Skin bruised/echymotic all over, large bruising to back. Received bath and linen change and tolerated well, was able to assist with turning.

## 2018-01-20 NOTE — ASSESSMENT & PLAN NOTE
Erythema and warmth noted on left anterior shin  Area outlined with marker. Improved from prior exam   Continue doxycycline 100 mg Q12 x 7 days

## 2018-01-20 NOTE — PROGRESS NOTES
[facility]  Nephrology  Progress Note    Patient Name: Althea Hsieh  MRN: [id number]  Admission Date: 1/14/2018  Hospital Length of Stay: 6 days  Attending Provider: Bassam Burt MD   Primary Care Physician: Davis Syed MD  Principal Problem:Shock    Subjective:     HPI: Ms. Hsieh is a 61 yo WF with CAD s/p CABG 2016, PVD, COPD, ADPKD with renal and liver involvement, and ESRD on home HD who presented to an OSH on 1/14 with chest pressure, SOB, and new O2 requirement. She recently had a fall which resulted in L rib fractures and bleeding from both arms. She received 1 unit pRBC at OSH and was transferred to [facility] for further care. Upon further chart review she has been dealing with anemia of unknown source for some time. She receives Micera outpatient. She has been sent to GI for colonoscopy but never went. She [facility] Each session is 2 hours 23 minutes. EDW 48kg. She no longer makes urine. She has low BP at baseline and takes midodrine with HD. She hasn't performed HD since Friday. She recent recent kidney cyst rupture which resulted in mild fever and hematuria; symptoms have since resolved. She currently denies any chest pressure. Troponin has trended from 0.15 to 3.67 and now 25.4. SBP 70-90s. She is developing worsening hypoxia on 5L NC. Nephrology consulted for urgent HD for volume overload. Primary team placing trialysis catheter now. Consent for dialysis obtained by patient and placed in chart.     Interval History: no new issues    Review of patient's allergies indicates:   Allergen Reactions    Sulfamethoxazole-trimethoprim Other (See Comments)     Pt is unsure if she is actually allergic to the medicine.      Current Facility-Administered Medications   Medication Frequency    0.9%  NaCl infusion (CRRT USE ONLY) Continuous    albuterol inhaler 2 puff Q6H PRN    ALPRAZolam tablet 1 mg TID PRN    amiodarone tablet 200 mg  Daily    aspirin EC tablet 81 mg Daily    atorvastatin tablet 80 mg Daily    calcitRIOL capsule 0.25 mcg Daily    clopidogrel tablet 75 mg Daily    cyclobenzaprine tablet 5 mg TID PRN    dextromethorphan-guaifenesin  mg per 12 hr tablet 1 tablet BID    doxycycline tablet 100 mg Q12H    escitalopram oxalate tablet 20 mg Daily    lanthanum chewable tablet 1,000 mg TID WM    levOCARNitine capsule 250 mg Daily    levothyroxine tablet 125 mcg Before breakfast    magnesium sulfate 2g in water 50mL IVPB (premix) PRN    midodrine tablet 5 mg TID    nitroGLYCERIN SL tablet 0.4 mg Q5 Min PRN    norepinephrine 16 mg in dextrose 5 % 250 mL infusion Continuous    ondansetron injection 4 mg Q6H PRN    oxyCODONE immediate release tablet 15 mg Q6H PRN    pantoprazole EC tablet 40 mg Daily    polyethylene glycol packet 17 g Daily    pramipexole tablet 1 mg QHS    prochlorperazine tablet 10 mg TID PRN    sodium chloride 0.9% flush 3 mL Q8H       Objective:     Vital Signs (Most Recent):  Temp: 98.7 °F (37.1 °C) (01/20/18 1100)  Pulse: 66 (01/20/18 1300)  Resp: 18 (01/20/18 0900)  BP: (!) 63/30 (01/20/18 0700)  SpO2: 97 % (01/20/18 1300)  O2 Device (Oxygen Therapy): nasal cannula (01/20/18 1300) Vital Signs (24h Range):  Temp:  [98.7 °F (37.1 °C)-99.7 °F (37.6 °C)] 98.7 °F (37.1 °C)  Pulse:  [61-68] 66  Resp:  [18-58] 18  SpO2:  [79 %-100 %] 97 %  BP: (63-97)/(30-60) 63/30  Arterial Line BP: ()/(24-52) 96/37     Weight: 48 kg (105 lb 13.1 oz) (01/20/18 0400)  Body mass index is 20.67 kg/m².  Body surface area is 1.43 meters squared.    I/O last 3 completed shifts:  In: 4167.6 [P.O.:600; I.V.:3567.6]  Out: 2586 [Other:2586]    Physical Exam   HENT:   Head: Atraumatic.   Neck: No JVD present.   Cardiovascular: Normal rate and regular rhythm.    Pulmonary/Chest: Effort normal and breath sounds normal.   Abdominal: Soft. She exhibits no distension.   Musculoskeletal: She exhibits no edema or tenderness.    Neurological: She is alert.   Skin: Skin is warm.         Assessment/Plan:     ESRD on dialysis    - performs home hemodialysis via LUE AVF 5 days per week ([facility]); each treatment is 2 hours 23 minutes. EDW 48kg. No residual renal function  - started on SLED > HD given troponin 38. Troponin improved to 23 with medical management. Asymptomatic. Continued CRRT until patient weaned from HFNC to NC; around 8pm last night  - relatively stable this morning, tolerated SLED overnight, labs look good, improving respiratory status, hold for now and reassess tomorrow            Thank you for your consult. I will follow-up with patient. Please contact us if you have any additional questions.    Abelardo Gallegos MD  Nephrology  [facility]      I have reviewed and concur with the fellow's history, physical, assessment, and plan. I have personally interviewed and examined the patient at bedside.

## 2018-01-21 PROBLEM — R57.1 HYPOVOLEMIC SHOCK: Status: ACTIVE | Noted: 2018-01-01

## 2018-01-21 NOTE — ASSESSMENT & PLAN NOTE
Long hx of CAD with PCI in 1977 and 1980 and CABG x 3 in 6/2016. Still smokes. Here with NSTEMi likely demand ischemia from acute blood loss anemia.    - Echo shows EF 30-35% with severe left atrial enlargement. The inferior wall and apex are akinetic with global hypokinesis  - Holding off on heparin gtt due to high bleeding risk  - Continue ASA/plavix  - Received 4 units pRBC 1/14/18 . Hgb stable at 10  - Troponins are decreasing (38->23). Patient is chest-pain free  -Will c/w medical management  - Holding BB due to shock

## 2018-01-21 NOTE — ASSESSMENT & PLAN NOTE
- patient presented with SOB with bilateral pulmonary edema. Inciting event was likely NSTEMI   - Has been receiving CRRT  - Unable to start BB or ACE-I given hypotension

## 2018-01-21 NOTE — PLAN OF CARE
"Problem: Patient Care Overview  Goal: Plan of Care Review  Outcome: Ongoing (interventions implemented as appropriate)  Care Plan/Shift Note  See Vital Sign and Assessment Flowsheets for details.     Cardiovascular: NSR, remains on Levophed and titrating per order SBP > 100 on arterial line    Pulmonary: Room Air to 2 L NC , wears home O2 for "dialysis and shortness of breath only"    Neuro: Awake and alert, agitated a few times this shift related to positioning/discomfort, prn pain meds available and received    Gastrointestinal: No abdominal discomfort, loose/liquid stools x 3 this shift     Genitourinary: Anuric, ESRD, no CRRT this shift    Lines / Infusions: Right Trialysis remains in place, dressing reinforced with new tegaderm, infusing Levophed @ 0.1 mcg/kg/min, right brachial arterial line in place    Plan / Recs:  Weaning Levophed as tolerated and returning to regular HD treatments when tolerating     **This POC reviewed with patient Althea and verbalized understanding            "

## 2018-01-21 NOTE — PROGRESS NOTES
[facility]  Cardiology  Progress Note    Patient Name: Althea Hsieh  MRN: [id number]  Admission Date: 1/14/2018  Hospital Length of Stay: 7 days  Code Status: Full Code   Attending Physician: Bassam Burt MD   Primary Care Physician: Davis Syed MD  Expected Discharge Date: 1/24/2018  Principal Problem:Hypovolemic shock    Subjective:     Hospital Course:   1/14: Admitted to CCU for ACS, volume overload, and blood loss anemia. Nephrology consulted for SLED  1/15: SLED overnight. Required low dose levophed during dialysis. Troponins continued to rise (17>25>38) but flattened at 38. Interventional cardiology consulted for C. Patient remained chest pain free. Still requiring 8 L of O2. Started on antibiotics for LLL pneumonia seen on CTA.    Interval History: no acute events overnight. Still requiring levophed 0.08. Now on home O2 regiment of 3 L. Denies CP or SOB. States that she would like to go home.    Review of Systems   Constitution: Positive for weakness. Negative for chills and fever.   HENT: Negative for sore throat.    Eyes: Negative for blurred vision.   Cardiovascular: Negative for chest pain, dyspnea on exertion and palpitations.   Respiratory: Negative for cough, shortness of breath and wheezing.    Skin: Positive for color change.   Musculoskeletal: Positive for back pain and joint pain.   Gastrointestinal: Positive for bloating. Negative for abdominal pain, constipation, diarrhea, nausea and vomiting.   Neurological: Negative for dizziness, headaches and light-headedness.     Objective:     Vital Signs (Most Recent):  Temp: 97.5 °F (36.4 °C) (01/21/18 1500)  Pulse: (!) 50 (01/21/18 1600)  Resp: 20 (01/21/18 1500)  BP: (!) 63/30 (01/20/18 0700)  SpO2: 99 % (01/21/18 1600) Vital Signs (24h Range):  Temp:  [97.5 °F (36.4 °C)-99.4 °F (37.4 °C)] 97.5 °F (36.4 °C)  Pulse:  [50-70] 50  Resp:  [16-22] 20  SpO2:  [90 %-100 %] 99 %  Arterial Line BP: ()/(26-57) 111/40      Weight: 49 kg (108 lb 0.4 oz)  Body mass index is 21.1 kg/m².     SpO2: 99 %  O2 Device (Oxygen Therapy): nasal cannula      Intake/Output Summary (Last 24 hours) at 01/21/18 1646  Last data filed at 01/21/18 1600   Gross per 24 hour   Intake           845.05 ml   Output                0 ml   Net           845.05 ml       Lines/Drains/Airways     Central Venous Catheter Line                 Trialysis (Dialysis) Catheter 01/14/18 1346 right internal jugular 7 days          Drain                 Hemodialysis AV Graft Left upper arm -- days         Hemodialysis AV Fistula 01/14/18 0849 Left upper arm 7 days          Arterial Line                 Arterial Line 01/19/18 1600 Right Brachial 2 days                Physical Exam   Constitutional: She is oriented to person, place, and time. She appears well-developed and well-nourished.   HENT:   Head: Normocephalic and atraumatic.   Neck: Normal range of motion. Neck supple. No JVD present.   Cardiovascular: Normal rate, regular rhythm and normal heart sounds.  Exam reveals no gallop and no friction rub.    No murmur heard.  Pulmonary/Chest: Effort normal.   Difficulty with taking a deep breath due to rib fractures but improved from previous   Mild rases at base of lungs but significantly improved   Abdominal: Soft. Bowel sounds are normal. She exhibits distension.   Musculoskeletal: Normal range of motion. She exhibits no edema.   Neurological: She is alert and oriented to person, place, and time.   Skin: Skin is warm and dry.   Erythema noted on left anterior shin. Improved from previous exam       Significant Labs: All pertinent lab results from the last 24 hours have been reviewed.        Assessment and Plan:       * Hypovolemic shock    Shock likely multifactorial including hypovolemia from CRRT volume removal and metabolic acidosis superimposed on chronic respiratory acidosis. Unlikely to be cardiogenic shock based on Svo2  No sign of sepsis, blood cultures are  negative and just finished course of ceftriaxone and remains on doxycycline.    -On Levophed goal is SBP over 90, will downtitrate           Acute on chronic respiratory failure with hypoxia and hypercapnia    - 2/2 acute HR exacerbation   - Improving with volume removal via CRRT   - now close to baseline O2 requirement         Acute combined systolic and diastolic heart failure    - patient presented with SOB with bilateral pulmonary edema. Inciting event was likely NSTEMI   - Has been receiving CRRT  - Unable to start BB or ACE-I given hypotension         Hyperphosphatemia    - likely due to renal failure  - 6.4 this AM  - continue home phos binders (unable to order today as not available in pharmacy)        Chronic back pain    - resume home oxycodone IR 30 mg prn   - can give flexeril as needed        RLS (restless legs syndrome)    -continue home pramipexole         Community acquired pneumonia    - CTA chest showed abnormal acute airspace disease left lower lobe suspicious for pneumonia  - patient is afebrile and without productive cough. Now down to 3 L   - Finished course of IV ceftriaxone on 1/19/18        Skin tear of forearm without complication    Wound care consulted        Unstable angina    Long hx of CAD with PCI in 1977 and 1980 and CABG x 3 in 6/2016. Still smokes. Here with NSTEMi likely demand ischemia from acute blood loss anemia.    - Echo shows EF 30-35% with severe left atrial enlargement. The inferior wall and apex are akinetic with global hypokinesis  - Holding off on heparin gtt due to high bleeding risk  - Continue ASA/plavix  - Received 4 units pRBC 1/14/18 . Hgb stable at 10  - Troponins are decreasing (38->23). Patient is chest-pain free  -Will c/w medical management  - Holding BB due to shock           Inferior myocardial infarction    See unstable angina         Thrombocytopenia    - likely due to chronic liver disease (polycystic liver?)  - continue to follow        Cellulitis     Erythema and warmth noted on left anterior shin  Area outlined with marker. Improved from prior exam   Continue doxycycline 100 mg Q12 x 7 days        PAD S/P femoral-popliteal bypass surgery    - No current pain or non healing ulcers at present.  - Continue ASA/Plavix  - Hold cilostazol in the setting of heart failure exacerbation         Tobacco abuse    Smoking cessation discussed         ESRD on dialysis    - CRRT with goal is to transition to HD when off pressors  - Now euvolemic. No longer requiring additional volume removal   - When tolerating HD will step down to floor          CAD (coronary artery disease)    Hx of PCI and CABG x 3. Will continue home medications.             VTE Risk Mitigation         Ordered     Low Risk of VTE  Once      01/14/18 0320          Chevy Johnston MD  Cardiology  [facility]

## 2018-01-21 NOTE — ASSESSMENT & PLAN NOTE
- performs home hemodialysis via LUE AVF 5 days per week ([facility]); each treatment is 2 hours 23 minutes. EDW 48kg. No residual renal function  - started on SLED > HD given troponin 38. Troponin improved to 23 with medical management. Asymptomatic  - now off SLED for > 24hrs  -this morning doing well, lytes, acid/base and volume status stable    Plan:  1) HD in the [facility] tomorrow (monday)

## 2018-01-21 NOTE — ASSESSMENT & PLAN NOTE
- likely due to renal failure  - 6.4 this AM  - continue home phos binders (unable to order today as not available in pharmacy)

## 2018-01-21 NOTE — PROGRESS NOTES
[facility]  Nephrology  Progress Note    Patient Name: Althea Hsieh  MRN: [id number]  Admission Date: 1/14/2018  Hospital Length of Stay: 7 days  Attending Provider: Bassam Burt MD   Primary Care Physician: Davis Syed MD  Principal Problem:Hypovolemic shock    Subjective:     HPI: Ms. Hsieh is a 61 yo WF with CAD s/p CABG 2016, PVD, COPD, ADPKD with renal and liver involvement, and ESRD on home HD who presented to an OSH on 1/14 with chest pressure, SOB, and new O2 requirement. She recently had a fall which resulted in L rib fractures and bleeding from both arms. She received 1 unit pRBC at OSH and was transferred to [facility] for further care. Upon further chart review she has been dealing with anemia of unknown source for some time. She receives Micera outpatient. She has been sent to GI for colonoscopy but never went. [facility] Each session is 2 hours 23 minutes. EDW 48kg. She no longer makes urine. She has low BP at baseline and takes midodrine with HD. She hasn't performed HD since Friday. She recent recent kidney cyst rupture which resulted in mild fever and hematuria; symptoms have since resolved. She currently denies any chest pressure. Troponin has trended from 0.15 to 3.67 and now 25.4. SBP 70-90s. She is developing worsening hypoxia on 5L NC. Nephrology consulted for urgent HD for volume overload. Primary team placing trialysis catheter now. Consent for dialysis obtained by patient and placed in chart.     Interval History: no new issues, wants to go home this morning    Review of patient's allergies indicates:   Allergen Reactions    Sulfamethoxazole-trimethoprim Other (See Comments)     Pt is unsure if she is actually allergic to the medicine.      Current Facility-Administered Medications   Medication Frequency    albuterol inhaler 2 puff Q6H PRN    ALPRAZolam tablet 1 mg TID PRN    amiodarone tablet 200 mg Daily     aspirin EC tablet 81 mg Daily    atorvastatin tablet 80 mg Daily    calcitRIOL capsule 0.25 mcg Daily    clopidogrel tablet 75 mg Daily    cyclobenzaprine tablet 5 mg TID PRN    dextromethorphan-guaifenesin  mg per 12 hr tablet 1 tablet BID    doxycycline tablet 100 mg Q12H    escitalopram oxalate tablet 20 mg Daily    levOCARNitine capsule 250 mg Daily    levothyroxine tablet 125 mcg Before breakfast    midodrine tablet 5 mg TID    nitroGLYCERIN SL tablet 0.4 mg Q5 Min PRN    norepinephrine 16 mg in dextrose 5 % 250 mL infusion Continuous    ondansetron injection 4 mg Q6H PRN    oxyCODONE immediate release tablet 15 mg Q6H PRN    pantoprazole EC tablet 40 mg Daily    polyethylene glycol packet 17 g Daily    pramipexole tablet 1 mg QHS    prochlorperazine tablet 10 mg TID PRN    sodium chloride 0.9% flush 3 mL Q8H       Objective:     Vital Signs (Most Recent):  Temp: 98.4 °F (36.9 °C) (01/21/18 0700)  Pulse: (!) 56 (01/21/18 1000)  Resp: 20 (01/21/18 0900)  BP: (!) 63/30 (01/20/18 0700)  SpO2: 99 % (01/21/18 1000)  O2 Device (Oxygen Therapy): nasal cannula (01/21/18 1000) Vital Signs (24h Range):  Temp:  [98.4 °F (36.9 °C)-99.4 °F (37.4 °C)] 98.4 °F (36.9 °C)  Pulse:  [54-70] 56  Resp:  [16-22] 20  SpO2:  [90 %-100 %] 99 %  Arterial Line BP: ()/(26-57) 113/34     Weight: 49 kg (108 lb 0.4 oz) (01/21/18 0400)  Body mass index is 21.1 kg/m².  Body surface area is 1.44 meters squared.    I/O last 3 completed shifts:  In: 2819.3 [P.O.:220; I.V.:2599.3]  Out: 2262 [Other:2262]    Physical Exam   HENT:   Head: Atraumatic.   Neck: No JVD present.   Cardiovascular: Normal rate and regular rhythm.    Pulmonary/Chest: Effort normal and breath sounds normal.   Abdominal: Soft. She exhibits no distension.   Musculoskeletal: She exhibits edema. She exhibits no tenderness.   Neurological: She is alert.   Skin: Skin is warm.         Assessment/Plan:     ESRD on dialysis    - performs home  hemodialysis via LUE AVF 5 days per week ([facility]); each treatment is 2 hours 23 minutes. EDW 48kg. No residual renal function  - started on SLED > HD given troponin 38. Troponin improved to 23 with medical management. Asymptomatic  - now off SLED for > 24hrs  -this morning doing well, lytes, acid/base and volume status stable    Plan:  1) HD in the [facility] tomorrow (monday)            Thank you for your consult. I will follow-up with patient. Please contact us if you have any additional questions.    Abelardo Gallegos MD  Nephrology  [facility]    I have reviewed and concur with the fellow's history, physical, assessment, and plan. I have personally interviewed and examined the patient at bedside.

## 2018-01-21 NOTE — PLAN OF CARE
Problem: Patient Care Overview  Goal: Plan of Care Review  Outcome: Ongoing (interventions implemented as appropriate)  See vital signs and assessments in flowsheets. See below for updates on today's progress.     Pulmonary: @ 2L [place], O2sat >95%    Cardiovascular: SB-SR 50-70's. Levo titrated to maintain systolic BP >90. Echo done on shift. EF 40%    Neurological: AAO, afebrile     Gastrointestinal: no BM , renal diet.    Genitourinary: anuric. Plan for HD monday    Endocrine: normoglycemic     Integumentary/Other:     Infusions: levo    Patient progressing towards goals as tolerated, plan of care communicated and reviewed with patient and family. All concerns addressed. Will continue to monitor.

## 2018-01-21 NOTE — SUBJECTIVE & OBJECTIVE
Interval History: no new issues, wants to go home this morning    Review of patient's allergies indicates:   Allergen Reactions    Sulfamethoxazole-trimethoprim Other (See Comments)     Pt is unsure if she is actually allergic to the medicine.      Current Facility-Administered Medications   Medication Frequency    albuterol inhaler 2 puff Q6H PRN    ALPRAZolam tablet 1 mg TID PRN    amiodarone tablet 200 mg Daily    aspirin EC tablet 81 mg Daily    atorvastatin tablet 80 mg Daily    calcitRIOL capsule 0.25 mcg Daily    clopidogrel tablet 75 mg Daily    cyclobenzaprine tablet 5 mg TID PRN    dextromethorphan-guaifenesin  mg per 12 hr tablet 1 tablet BID    doxycycline tablet 100 mg Q12H    escitalopram oxalate tablet 20 mg Daily    levOCARNitine capsule 250 mg Daily    levothyroxine tablet 125 mcg Before breakfast    midodrine tablet 5 mg TID    nitroGLYCERIN SL tablet 0.4 mg Q5 Min PRN    norepinephrine 16 mg in dextrose 5 % 250 mL infusion Continuous    ondansetron injection 4 mg Q6H PRN    oxyCODONE immediate release tablet 15 mg Q6H PRN    pantoprazole EC tablet 40 mg Daily    polyethylene glycol packet 17 g Daily    pramipexole tablet 1 mg QHS    prochlorperazine tablet 10 mg TID PRN    sodium chloride 0.9% flush 3 mL Q8H       Objective:     Vital Signs (Most Recent):  Temp: 98.4 °F (36.9 °C) (01/21/18 0700)  Pulse: (!) 56 (01/21/18 1000)  Resp: 20 (01/21/18 0900)  BP: (!) 63/30 (01/20/18 0700)  SpO2: 99 % (01/21/18 1000)  O2 Device (Oxygen Therapy): nasal cannula (01/21/18 1000) Vital Signs (24h Range):  Temp:  [98.4 °F (36.9 °C)-99.4 °F (37.4 °C)] 98.4 °F (36.9 °C)  Pulse:  [54-70] 56  Resp:  [16-22] 20  SpO2:  [90 %-100 %] 99 %  Arterial Line BP: ()/(26-57) 113/34     Weight: 49 kg (108 lb 0.4 oz) (01/21/18 0400)  Body mass index is 21.1 kg/m².  Body surface area is 1.44 meters squared.    I/O last 3 completed shifts:  In: 2819.3 [P.O.:220; I.V.:2599.3]  Out: 1196  [Other:2262]    Physical Exam   HENT:   Head: Atraumatic.   Neck: No JVD present.   Cardiovascular: Normal rate and regular rhythm.    Pulmonary/Chest: Effort normal and breath sounds normal.   Abdominal: Soft. She exhibits no distension.   Musculoskeletal: She exhibits edema. She exhibits no tenderness.   Neurological: She is alert.   Skin: Skin is warm.

## 2018-01-21 NOTE — PROGRESS NOTES
Md Johnston at bedside. Notified of patient heart rate in low 50's, currently 54. No new orders at present time.

## 2018-01-21 NOTE — SUBJECTIVE & OBJECTIVE
Interval History: no acute events overnight. Still requiring levophed 0.08. Now on home O2 regiment of 3 L. Denies CP or SOB. States that she would like to go home.    Review of Systems   Constitution: Positive for weakness. Negative for chills and fever.   HENT: Negative for sore throat.    Eyes: Negative for blurred vision.   Cardiovascular: Negative for chest pain, dyspnea on exertion and palpitations.   Respiratory: Negative for cough, shortness of breath and wheezing.    Skin: Positive for color change.   Musculoskeletal: Positive for back pain and joint pain.   Gastrointestinal: Positive for bloating. Negative for abdominal pain, constipation, diarrhea, nausea and vomiting.   Neurological: Negative for dizziness, headaches and light-headedness.     Objective:     Vital Signs (Most Recent):  Temp: 97.5 °F (36.4 °C) (01/21/18 1500)  Pulse: (!) 50 (01/21/18 1600)  Resp: 20 (01/21/18 1500)  BP: (!) 63/30 (01/20/18 0700)  SpO2: 99 % (01/21/18 1600) Vital Signs (24h Range):  Temp:  [97.5 °F (36.4 °C)-99.4 °F (37.4 °C)] 97.5 °F (36.4 °C)  Pulse:  [50-70] 50  Resp:  [16-22] 20  SpO2:  [90 %-100 %] 99 %  Arterial Line BP: ()/(26-57) 111/40     Weight: 49 kg (108 lb 0.4 oz)  Body mass index is 21.1 kg/m².     SpO2: 99 %  O2 Device (Oxygen Therapy): nasal cannula      Intake/Output Summary (Last 24 hours) at 01/21/18 1646  Last data filed at 01/21/18 1600   Gross per 24 hour   Intake           845.05 ml   Output                0 ml   Net           845.05 ml       Lines/Drains/Airways     Central Venous Catheter Line                 Trialysis (Dialysis) Catheter 01/14/18 1346 right internal jugular 7 days          Drain                 Hemodialysis AV Graft Left upper arm -- days         Hemodialysis AV Fistula 01/14/18 0849 Left upper arm 7 days          Arterial Line                 Arterial Line 01/19/18 1600 Right Brachial 2 days                Physical Exam   Constitutional: She is oriented to person, place,  and time. She appears well-developed and well-nourished.   HENT:   Head: Normocephalic and atraumatic.   Neck: Normal range of motion. Neck supple. No JVD present.   Cardiovascular: Normal rate, regular rhythm and normal heart sounds.  Exam reveals no gallop and no friction rub.    No murmur heard.  Pulmonary/Chest: Effort normal.   Difficulty with taking a deep breath due to rib fractures but improved from previous   Mild rases at base of lungs but significantly improved   Abdominal: Soft. Bowel sounds are normal. She exhibits distension.   Musculoskeletal: Normal range of motion. She exhibits no edema.   Neurological: She is alert and oriented to person, place, and time.   Skin: Skin is warm and dry.   Erythema noted on left anterior shin. Improved from previous exam       Significant Labs: All pertinent lab results from the last 24 hours have been reviewed.

## 2018-01-21 NOTE — ASSESSMENT & PLAN NOTE
Shock likely multifactorial including hypovolemia from CRRT volume removal and metabolic acidosis superimposed on chronic respiratory acidosis. Unlikely to be cardiogenic shock based on Svo2  No sign of sepsis, blood cultures are negative and just finished course of ceftriaxone and remains on doxycycline.    -On Levophed goal is SBP over 90, will downtitrate

## 2018-01-22 PROBLEM — J96.12 CHRONIC RESPIRATORY FAILURE WITH HYPOXIA AND HYPERCAPNIA: Status: ACTIVE | Noted: 2018-01-01

## 2018-01-22 PROBLEM — J96.11 CHRONIC RESPIRATORY FAILURE WITH HYPOXIA AND HYPERCAPNIA: Status: ACTIVE | Noted: 2018-01-01

## 2018-01-22 PROBLEM — I24.9 ACS (ACUTE CORONARY SYNDROME): Status: ACTIVE | Noted: 2018-01-01

## 2018-01-22 PROBLEM — I20.0 UNSTABLE ANGINA: Status: RESOLVED | Noted: 2018-01-01 | Resolved: 2018-01-01

## 2018-01-22 NOTE — SUBJECTIVE & OBJECTIVE
Interval History: no acute events overnight. Patient off levophed since 2 AM. Somnolent this morning. On home O2 regiment of 2 L. Denies SOB or CP. Expressing desire to go home.    Review of Systems   Constitution: Positive for weakness. Negative for chills and fever.   HENT: Negative for sore throat.    Eyes: Negative for blurred vision.   Cardiovascular: Negative for chest pain, dyspnea on exertion and palpitations.   Respiratory: Negative for cough, shortness of breath and wheezing.    Skin: Positive for color change.   Musculoskeletal: Positive for back pain and joint pain.   Gastrointestinal: Positive for bloating. Negative for abdominal pain, constipation, diarrhea, nausea and vomiting.   Neurological: Negative for dizziness, headaches and light-headedness.     Objective:     Vital Signs (Most Recent):  Temp: 97.4 °F (36.3 °C) (01/22/18 1100)  Pulse: 64 (01/22/18 1400)  Resp: (!) 25 (01/22/18 0600)  BP: (!) 110/34 (01/22/18 0400)  SpO2: 96 % (01/22/18 1400) Vital Signs (24h Range):  Temp:  [97.1 °F (36.2 °C)-97.6 °F (36.4 °C)] 97.4 °F (36.3 °C)  Pulse:  [45-69] 64  Resp:  [18-28] 25  SpO2:  [90 %-100 %] 96 %  BP: (102-110)/(32-34) 110/34  Arterial Line BP: ()/(31-67) 135/59     Weight: 49 kg (108 lb 0.4 oz)  Body mass index is 21.1 kg/m².     SpO2: 96 %  O2 Device (Oxygen Therapy): nasal cannula      Intake/Output Summary (Last 24 hours) at 01/22/18 1419  Last data filed at 01/22/18 1300   Gross per 24 hour   Intake           772.83 ml   Output                0 ml   Net           772.83 ml       Lines/Drains/Airways     Central Venous Catheter Line                 Trialysis (Dialysis) Catheter 01/14/18 1346 right internal jugular 8 days          Drain                 Hemodialysis AV Graft Left upper arm -- days         Hemodialysis AV Fistula 01/14/18 0849 Left upper arm 8 days          Arterial Line                 Arterial Line 01/19/18 1600 Right Brachial 2 days                Physical Exam    Constitutional: She is oriented to person, place, and time. She appears well-developed and well-nourished.   HENT:   Head: Normocephalic and atraumatic.   Neck: Normal range of motion. Neck supple. No JVD present.   Cardiovascular: Normal rate, regular rhythm and normal heart sounds.  Exam reveals no gallop and no friction rub.    No murmur heard.  Pulmonary/Chest: Effort normal.   Difficulty with taking a deep breath due to rib fractures but improved from previous   Mild rases at base of lungs but significantly improved   Abdominal: Soft. Bowel sounds are normal. She exhibits distension.   Musculoskeletal: Normal range of motion. She exhibits no edema.   Neurological: She is alert and oriented to person, place, and time.   Skin: Skin is warm and dry.   Erythema noted on left anterior shin. Improved from previous exam       Significant Labs: All pertinent lab results from the last 24 hours have been reviewed.

## 2018-01-22 NOTE — PROGRESS NOTES
[facility]  Cardiology  Progress Note    Patient Name: Althea Hsieh  MRN: [id number]  Admission Date: 1/14/2018  Hospital Length of Stay: 8 days  Code Status: Full Code   Attending Physician: Bassam Burt MD   Primary Care Physician: Davis Syed MD  Expected Discharge Date: 1/24/2018  Principal Problem:Hypovolemic shock    Subjective:     Hospital Course:   1/14 - 1/15: Admitted to CCU for ACS, volume overload, and blood loss anemia. Nephrology consulted for SLED. Received SLED overnight. Required low dose levophed during dialysis. Troponins continued to rise (17>25>38) but flattened at 38. Interventional cardiology consulted for LHC. Patient remained chest pain free. Still requiring 8 L of O2. Started on antibiotics for LLL pneumonia seen on CTA.  1/15: No acute events overnight. Patient received 1 unit pRBC yesterday and 2 units early this morning. H/H 10 and 30 this AM. SLED started overnight and continues. Requiring 0.06 of levophed for pressure support during dialysis. Patient denies CP, SOB at rest, nausea, or vomiting. Endorses some abdominal tightness but it appears to be close to baseline. Patient remains anuric. Currently satting 97% on 8 L  1/16: no acute events overnight. SLED overnight, stopped this morning. No volume removed. Denies CP or SOB at rest, but having some dyspnea. Sitting in chair on exam. Still requiring up to 7 L O2. Endorses productive cough but denies fever/chills  1/17: SLED overnight with . Patient had increase in O2 requirements (up to 12 L). Occasionally requiring levophed during fluid removal. This morning, denies SOB at rest, CP, palpitations. Has some new leg pain in left anterior shin, noted to be erythematous and slightly warm.  1/18: No acute overnight events. Off Levophed since 2am.  1/19: Had some abd cramping and nausea yesterday. Received GI cocktail which resolved pain. Required levophed overnight up to 0.18. Patient denies  fever/chills, SOB, CP, abd pain, nausea, vomiting.  1/20: Required levophed overnight up to 0.2. Downtitrating now, A-line shows wide pulse pressures, no previous AI noted on echo and no AI murmur on physical exam   1/21: No acute events overnight. Still requiring levophed 0.08. Now on home O2 regiment of 3 L. Denies CP or SOB. States that she would like to go home.  1/22: Off levophed since 5 am. HD planned for today. Will keep in ICU to see if patient is able to tolerate HD    Interval History: no acute events overnight. Patient off levophed since 2 AM. Somnolent this morning. On home O2 regiment of 2 L. Denies SOB or CP. Expressing desire to go home.    Review of Systems   Constitution: Positive for weakness. Negative for chills and fever.   HENT: Negative for sore throat.    Eyes: Negative for blurred vision.   Cardiovascular: Negative for chest pain, dyspnea on exertion and palpitations.   Respiratory: Negative for cough, shortness of breath and wheezing.    Skin: Positive for color change.   Musculoskeletal: Positive for back pain and joint pain.   Gastrointestinal: Positive for bloating. Negative for abdominal pain, constipation, diarrhea, nausea and vomiting.   Neurological: Negative for dizziness, headaches and light-headedness.     Objective:     Vital Signs (Most Recent):  Temp: 97.4 °F (36.3 °C) (01/22/18 1100)  Pulse: 64 (01/22/18 1400)  Resp: (!) 25 (01/22/18 0600)  BP: (!) 110/34 (01/22/18 0400)  SpO2: 96 % (01/22/18 1400) Vital Signs (24h Range):  Temp:  [97.1 °F (36.2 °C)-97.6 °F (36.4 °C)] 97.4 °F (36.3 °C)  Pulse:  [45-69] 64  Resp:  [18-28] 25  SpO2:  [90 %-100 %] 96 %  BP: (102-110)/(32-34) 110/34  Arterial Line BP: ()/(31-67) 135/59     Weight: 49 kg (108 lb 0.4 oz)  Body mass index is 21.1 kg/m².     SpO2: 96 %  O2 Device (Oxygen Therapy): nasal cannula      Intake/Output Summary (Last 24 hours) at 01/22/18 1419  Last data filed at 01/22/18 1300   Gross per 24 hour   Intake            772.83 ml   Output                0 ml   Net           772.83 ml       Lines/Drains/Airways     Central Venous Catheter Line                 Trialysis (Dialysis) Catheter 01/14/18 1346 right internal jugular 8 days          Drain                 Hemodialysis AV Graft Left upper arm -- days         Hemodialysis AV Fistula 01/14/18 0849 Left upper arm 8 days          Arterial Line                 Arterial Line 01/19/18 1600 Right Brachial 2 days                Physical Exam   Constitutional: She is oriented to person, place, and time. She appears well-developed and well-nourished.   HENT:   Head: Normocephalic and atraumatic.   Neck: Normal range of motion. Neck supple. No JVD present.   Cardiovascular: Normal rate, regular rhythm and normal heart sounds.  Exam reveals no gallop and no friction rub.    No murmur heard.  Pulmonary/Chest: Effort normal.   Difficulty with taking a deep breath due to rib fractures but improved from previous   Mild rases at base of lungs but significantly improved   Abdominal: Soft. Bowel sounds are normal. She exhibits distension.   Musculoskeletal: Normal range of motion. She exhibits no edema.   Neurological: She is alert and oriented to person, place, and time.   Skin: Skin is warm and dry.   Erythema noted on left anterior shin. Improved from previous exam       Significant Labs: All pertinent lab results from the last 24 hours have been reviewed.        Assessment and Plan:         * Hypovolemic shock    Shock likely multifactorial including hypovolemia from CRRT volume removal and metabolic acidosis superimposed on chronic respiratory acidosis. Unlikely to be cardiogenic shock based on Svo2  No sign of sepsis, blood cultures are negative and just finished course of ceftriaxone and remains on doxycycline.    - Now off levophed  - D/c scheduled midodrine 5 mg TID. Resume midodrine 10 mg prior to dialysis           Chronic respiratory failure with hypoxia and hypercapnia    - 2/2 acute  HR exacerbation   - Improved with volume removal via CRRT   - now close to baseline O2 requirement         Acute combined systolic and diastolic heart failure    patient presented with SOB with bilateral pulmonary edema. Inciting event was likely NSTEMI   - Initial echo on 1/14 showed EF 30-35%. Repeat echo on 1/21 showed EF 40-45% with diastolic dysfunction   - Has been receiving CRRT with volume removal as tolerated  - Unable to start BB or ACE-I given hypotension           Hyperphosphatemia    - likely due to renal failure  - 5 this AM  - continue home phos binders        Chronic back pain    - resume home oxycodone IR 30 mg prn   - can give flexeril as needed        RLS (restless legs syndrome)    -continue home pramipexole         Community acquired pneumonia    - CTA chest showed abnormal acute airspace disease left lower lobe suspicious for pneumonia  - patient is afebrile and without productive cough. Now down to 3 L   - Finished course of IV ceftriaxone on 1/19/18        Skin tear of forearm without complication    Wound care consulted        Inferior myocardial infarction    Long hx of CAD with PCI in 1977 and 1980 and CABG x 3 in 6/2016. Still smokes. Here with NSTEMi likely demand ischemia from acute blood loss anemia.    - Echo shows EF 30-35% with severe left atrial enlargement. The inferior wall and apex are akinetic with global hypokinesis  - Holding off on heparin gtt due to high bleeding risk  - Continue ASA/plavix  - Received 4 units pRBC 1/14/18 . Hgb stable at 8.6  - Troponins flat. Patient is chest-pain free  - Will c/w medical management  - Holding BB due to shock         Thrombocytopenia    - likely due to chronic liver disease (polycystic liver?)  - continue to follow        Cellulitis    Erythema and warmth noted on left anterior shin  Area outlined with marker. Improved from prior exam   Completed doxycycline 100 mg Q12 x 5 days 1/22/18        PAD S/P femoral-popliteal bypass surgery    -  No current pain or non healing ulcers at present.  - Continue ASA/Plavix  - Hold cilostazol in the setting of heart failure exacerbation         Tobacco abuse    Smoking cessation discussed         ESRD on dialysis    - As patient is off pressors, will attempt HD today   - Now euvolemic. No longer requiring additional volume removal           CAD (coronary artery disease)    Hx of PCI and CABG x 3. Will continue home medications.             VTE Risk Mitigation         Ordered     Low Risk of VTE  Once      01/14/18 0320          Chevy Johnston MD  Cardiology  [facility]

## 2018-01-22 NOTE — PLAN OF CARE
Met with pt.  Remains in ICU. AAOx4. Pt. not medically stable for step down. D/C needs to be determined. Pt. states if home health services are needed she would like to go with [facility]. SW/CM will continue to follow.        01/22/18 1151   Right Care Assessment   Can the patient answer the patient profile reliably? Yes, cognitively intact   How often would a person be available to care for the patient? Whenever needed   Describe the patient's ability to walk at the present time. Walks with the help of equipment   How does the patient rate their overall health at the present time? Fair   Number of comorbid conditions (as recorded on the chart) Three   During the past month, has the patient often been bothered by feeling down, depressed or hopeless? No   During the past month, has the patient often been bothered by little interest or pleasure in doing things? No

## 2018-01-22 NOTE — ASSESSMENT & PLAN NOTE
- continue calcitriol  - patient takes Auryxia TID w/meals at home; not available inpatient  - will start sevelamer 800mg TID

## 2018-01-22 NOTE — PROGRESS NOTES
Spoke with Dr. Plascencia about patient's HR at 47. Patient able to maintain SBP>90 with levo at 0.05. Dr. Plascencia at bedside. Orders to draw CMP, mag and thyroid labs. Orders to monitor patient and call Dr. Plascencia if patient's HR <35 and patient symptomatic.

## 2018-01-22 NOTE — ASSESSMENT & PLAN NOTE
- 2/2 acute HR exacerbation   - Improved with volume removal via CRRT   - now close to baseline O2 requirement

## 2018-01-22 NOTE — SUBJECTIVE & OBJECTIVE
Interval History:   SLED held over the weekend. No events overnight except for bradycardia with HR in the 40s. Remained on levophed until 5am this morning. Net +1L yesterday. Patient doing okay this morning. Sitting comfortably in chair; talking on phone. Denies any complaints. No SOB on RA; SpO2 96%.     Review of patient's allergies indicates:   Allergen Reactions    Sulfamethoxazole-trimethoprim Other (See Comments)     Pt is unsure if she is actually allergic to the medicine.      Current Facility-Administered Medications   Medication Frequency    0.9%  NaCl infusion PRN    albuterol inhaler 2 puff Q6H PRN    ALPRAZolam tablet 1 mg TID PRN    amiodarone tablet 200 mg Daily    aspirin EC tablet 81 mg Daily    atorvastatin tablet 80 mg Daily    calcitRIOL capsule 0.25 mcg Daily    clopidogrel tablet 75 mg Daily    cyclobenzaprine tablet 5 mg TID PRN    dextromethorphan-guaifenesin  mg per 12 hr tablet 1 tablet BID    escitalopram oxalate tablet 20 mg Daily    lanthanum chewable tablet 1,000 mg TID WM    levOCARNitine capsule 250 mg Daily    levothyroxine tablet 125 mcg Before breakfast    midodrine tablet 5 mg TID    nitroGLYCERIN SL tablet 0.4 mg Q5 Min PRN    ondansetron injection 4 mg Q6H PRN    oxyCODONE immediate release tablet 15 mg Q6H PRN    pantoprazole EC tablet 40 mg Daily    polyethylene glycol packet 17 g Daily    pramipexole tablet 1 mg QHS    prochlorperazine tablet 10 mg TID PRN    sodium chloride 0.9% flush 3 mL Q8H       Objective:     Vital Signs (Most Recent):  Temp: 97.4 °F (36.3 °C) (01/22/18 1100)  Pulse: 64 (01/22/18 1400)  Resp: (!) 25 (01/22/18 0600)  BP: (!) 110/34 (01/22/18 0400)  SpO2: 96 % (01/22/18 1400)  O2 Device (Oxygen Therapy): nasal cannula (01/22/18 0702) Vital Signs (24h Range):  Temp:  [97.1 °F (36.2 °C)-97.6 °F (36.4 °C)] 97.4 °F (36.3 °C)  Pulse:  [45-69] 64  Resp:  [18-28] 25  SpO2:  [90 %-100 %] 96 %  BP: (102-110)/(32-34) 110/34  Arterial  Line BP: ()/(31-67) 135/59     Weight: 49 kg (108 lb 0.4 oz) (01/21/18 0400)  Body mass index is 21.1 kg/m².  Body surface area is 1.44 meters squared.    I/O last 3 completed shifts:  In: 1221.2 [P.O.:740; I.V.:231.2; IV Piggyback:250]  Out: -     Physical Exam   Constitutional: She is oriented to person, place, and time. She appears well-developed and well-nourished. No distress.   HENT:   Head: Normocephalic and atraumatic.   Eyes: Conjunctivae and EOM are normal.   Cardiovascular: Normal rate and regular rhythm.    Pulmonary/Chest: Effort normal. She has rales.   Abdominal: Soft. She exhibits distension.   Musculoskeletal: She exhibits edema (trace). She exhibits no deformity.   Neurological: She is alert and oriented to person, place, and time.   Skin: Skin is warm and dry. She is not diaphoretic.       Significant Labs:  CBC:   Recent Labs  Lab 01/22/18  0304   WBC 7.04   RBC 2.82*   HGB 8.6*   HCT 28.1*   PLT 91*   *   MCH 30.5   MCHC 30.6*     CMP:   Recent Labs  Lab 01/22/18  0304   GLU 83  83  83   CALCIUM 8.2*  8.2*  8.2*   ALBUMIN 2.3*  2.3*   PROT 5.7*     140  140   K 4.4  4.4  4.4   CO2 23  23  23     106  106   BUN 26*  26*  26*   CREATININE 4.4*  4.4*  4.4*   ALKPHOS 111   ALT 12   AST 15   BILITOT 0.3     All labs within the past 24 hours have been reviewed.     Significant Imaging:  Labs: Reviewed

## 2018-01-22 NOTE — ASSESSMENT & PLAN NOTE
- performs home hemodialysis via LUE AVF 5 days per week (SuMTTF); each treatment is 2 hours 23 minutes. EDW 48kg. No residual renal function  - started on SLED > HD given troponin 38. Troponin improved to 23 with medical management. Asymptomatic. SLED continued until hypoxia resolved  - RRT held over weekend  - off pressors since 5am  - planning for iHD today x 3 hours; UF 1-2L as tolerated. Midodrine PRN

## 2018-01-22 NOTE — PLAN OF CARE
Problem: Patient Care Overview  Goal: Plan of Care Review  Outcome: Ongoing (interventions implemented as appropriate)  No acute events throughout day. See vital signs and assessments in flowsheets. See below for updates on today's progress.     Pulmonary: Patient remains on 2L NC with sats maintained above 95%, coarse lung sounds bilaterally. No CP or SOB.     Cardiovascular: Sinus bradycardia, HR 45-51, SBP maintained above 90 with levo titration. Weak pulses x4.     Neurological: AAOx4, follows commands, generalized weakness. Up in the chair and bedside commode.     Gastrointestinal: BMx1, cardiac/renal diet, active BS x4.      Genitourinary: Pt is anuric.    Endocrine: Normoglycemic.     Integumentary/Other: Bruises, L UA  AV fistula,  R brachial a line, R IJ trialysis    Infusions: Levo off since 0500.     Patient progressing towards goals as tolerated, plan of care communicated and reviewed with Althea Hsieh and family. All concerns addressed. Will continue to monitor.

## 2018-01-22 NOTE — PLAN OF CARE
Problem: Hemodialysis (Adult)  Goal: Signs and Symptoms of Listed Potential Problems Will be Absent, Minimized or Managed (Hemodialysis)  Signs and symptoms of listed potential problems will be absent, minimized or managed by discharge/transition of care (reference Hemodialysis (Adult) CPG).   Outcome: Ongoing (interventions implemented as appropriate)  Reviewed the different dialysis that she has been on. CRRT and Hd. She will be getting HD today.

## 2018-01-22 NOTE — PLAN OF CARE
Problem: Physical Therapy Goal  Goal: Physical Therapy Goal  Goals to be met by: 2018    Patient will increase functional independence with mobility by performin. Supine to sit with [place] - not met  2. Sit to stand transfer with [place] - not met  3. Bed to chair transfer with Modified [place] using Rolling Walker - not met  4. Gait  x 200 feet with Modified [place] using Rolling Walker. - not met     Outcome: Ongoing (interventions implemented as appropriate)  Treatment completed and no goals met. Date updated and goals appropriate.

## 2018-01-22 NOTE — ASSESSMENT & PLAN NOTE
patient presented with SOB with bilateral pulmonary edema. Inciting event was likely NSTEMI   - Initial echo on 1/14 showed EF 30-35%. Repeat echo on 1/21 showed EF 40-45% with diastolic dysfunction   - Has been receiving CRRT with volume removal as tolerated  - Unable to start BB or ACE-I given hypotension

## 2018-01-22 NOTE — PROGRESS NOTES
Pt with ESRD currently receiving CRRT and will transition to HD when stable.  Not a candidate for DMHF program.    Removed from suspect list.

## 2018-01-22 NOTE — ASSESSMENT & PLAN NOTE
- As patient is off pressors, will attempt HD today   - Now euvolemic. No longer requiring additional volume removal

## 2018-01-22 NOTE — ASSESSMENT & PLAN NOTE
Shock likely multifactorial including hypovolemia from CRRT volume removal and metabolic acidosis superimposed on chronic respiratory acidosis. Unlikely to be cardiogenic shock based on Svo2  No sign of sepsis, blood cultures are negative and just finished course of ceftriaxone and remains on doxycycline.    - Now off levophed  - D/c scheduled midodrine 5 mg TID. Resume midodrine 10 mg prior to dialysis

## 2018-01-22 NOTE — ASSESSMENT & PLAN NOTE
Long hx of CAD with PCI in 1977 and 1980 and CABG x 3 in 6/2016. Still smokes. Here with NSTEMi likely demand ischemia from acute blood loss anemia.    - Echo shows EF 30-35% with severe left atrial enlargement. The inferior wall and apex are akinetic with global hypokinesis  - Holding off on heparin gtt due to high bleeding risk  - Continue ASA/plavix  - Received 4 units pRBC 1/14/18 . Hgb stable at 8.6  - Troponins flat. Patient is chest-pain free  - Will c/w medical management  - Holding BB due to shock

## 2018-01-22 NOTE — PT/OT/SLP PROGRESS
Physical Therapy Treatment    Patient Name:  Althea Hsieh   MRN:  [id number]    Recommendations:     Discharge Recommendations:  home with home health   Discharge Equipment Recommendations: none   Barriers to discharge: None    Assessment:     Althea Hsieh is a 62 y.o. female admitted with a medical diagnosis of Hypovolemic shock.  She presents with the following impairments/functional limitations:  weakness, impaired self care skills, impaired functional mobilty, gait instability, impaired balance, pain. Pt progressing towards goals, but not at PLOF. Pt tolerated session well. Pt is improving with therapy evidenced by increased tolerance to therex. Recommend d/c to HH to maximize functional independence.      Rehab Prognosis:  good; patient would benefit from acute skilled PT services to address these deficits and reach maximum level of function.      Recent Surgery: Procedure(s) (LRB):  Left heart cath (Left) 7 Days Post-Op    Plan:     During this hospitalization, patient to be seen 4 x/week to address the above listed problems via gait training, therapeutic activities, therapeutic exercises, neuromuscular re-education  · Plan of Care Expires:  02/16/18   Plan of Care Reviewed with: patient    Subjective     Communicated with RN prior to session.  Patient found in chair upon PT entry to room, agreeable to treatment.      Chief Complaint: pain  Patient comments/goals: to get better and return home   Pain/Comfort:  · Pain Rating 1: 7/10 (L posterior ribs )  · Pain Addressed 1: Reposition, Distraction  · Pain Rating Post-Intervention 1: 7/10    Patients cultural, spiritual, Confucianist conflicts given the current situation: none reported     Objective:     Patient found with: telemetry, pulse ox (continuous), blood pressure cuff, oxygen, central line, arterial line     General Precautions: Standard, fall   Orthopedic Precautions:N/A   Braces: N/A     Functional Mobility:  · Bed Mobility: NT 2nd to pt found in  chair   · Transfers:     · Sit to Stand:  stand by assistance   · Gait: not performed   · Balance:   · Static sit: supervision  · Dynamic sit: supervision  · Static stand: SBA  · Dynamic stand: CGA (HHA)      AM-PAC 6 CLICK MOBILITY  Turning over in bed (including adjusting bedclothes, sheets and blankets)?: 4  Sitting down on and standing up from a chair with arms (e.g., wheelchair, bedside commode, etc.): 3  Moving from lying on back to sitting on the side of the bed?: 3  Need to walk in hospital room?: 3  Climbing 3-5 steps with a railing?: 2       Therapeutic Activities and Exercises:  Educated pt on PT POC  Educated pt on importance of OOB activity  Pt verbalized understanding    Standing B LE  Marching 2x10 reps with 1 seated rest break   Heel raises 2x10 reps with 1 seated rest break  Sit to stands 2x10 reps with 1 seated rest break    Pt able to don/doff B socks x 2 trials with mod indep in sitting     Patient left up in chair with all lines intact, call button in reach and RN notified..    GOALS:    Physical Therapy Goals        Problem: Physical Therapy Goal    Goal Priority Disciplines Outcome Goal Variances Interventions   Physical Therapy Goal     PT/OT, PT Ongoing (interventions implemented as appropriate)     Description:  Goals to be met by: 2018    Patient will increase functional independence with mobility by performin. Supine to sit with [place] - not met  2. Sit to stand transfer with [place] - not met  3. Bed to chair transfer with Modified [place] using Rolling Walker - not met  4. Gait  x 200 feet with Modified [place] using Rolling Walker. - not met                       Time Tracking:     PT Received On: 18  PT Start Time: 1231     PT Stop Time: 1304  PT Total Time (min): 33 min     Billable Minutes: Therapeutic Exercise 30    Treatment Type: Treatment              Ifeoma Munoz, PT, DPT  2018  [phone]

## 2018-01-22 NOTE — ASSESSMENT & PLAN NOTE
Erythema and warmth noted on left anterior shin  Area outlined with marker. Improved from prior exam   Completed doxycycline 100 mg Q12 x 5 days 1/22/18

## 2018-01-22 NOTE — PLAN OF CARE
01/22/18 1540   Discharge Assessment   Assessment Type Discharge Planning Reassessment   Discharge Plan A Home with family;Home Health   Discharge Plan B Home with family   Patient/Family In Agreement With Plan yes     PT recs home w/ home health and DME rolling walker. The patient family is requesting case manger to discuss DME. Will visit with family and discuss plan of care with cardiology team. She may need home O2 and has used home O2 in the past. Will verify if the family has her portable O2 at home and to bring it to the hospital to prepare for discharge.     Mett with patient and daughter and son in law in room. The patient uses [employer] for home O2 DME and she uses 3 LPM/NC on exertion.  And has used [facility] in the past and would like to request their services again for home health. She is requesting Kody Trevizo to be her nurse. Will discuss with Sunil. She stated she will have her family bring her home O2 to the hospital for discharge. She is having her spouse purchase a rollator, insurance won't pay for a rollator. She has a standard rolling walker at home but would like on with a seat and basket. Her MICHAEL mentioned a hospital bed for the home however the patient is not bed bound and insurance will not cover this. The patient does not wish to have a hospital bed in the home. The plan is to monitor the patient in the CMICU overnight and to receive HD. Plan is to step down the patient tomorrow with possible discharge as early as Wednesday. The patient is in agreement with the plan. Her Nephrologist is Dr. Love @ [facility] and she stats she has her home HD supplies. The HD tech is set up through her PCP. Will place call to Dr. Syed's office to confirm this and to make sure they are aware of her admission and discharge summary to be faxed. Will discuss with the Cardiology service the need for HH orders and discuss with the  to submit right care referral to [facility].

## 2018-01-23 NOTE — PLAN OF CARE
Problem: Physical Therapy Goal  Goal: Physical Therapy Goal  Goals to be met by: 2018    Patient will increase functional independence with mobility by performin. Supine to sit with [place] - not met  2. Sit to stand transfer with [place] - not met  3. Bed to chair transfer with Modified [place] using Rolling Walker - not met  4. Gait  x 200 feet with Modified [place] using Rolling Walker. - not met      Outcome: Ongoing (interventions implemented as appropriate)  Treatment completed and no goals met. Goals appropriate.

## 2018-01-23 NOTE — PLAN OF CARE
Problem: Patient Care Overview  Goal: Plan of Care Review  Outcome: Ongoing (interventions implemented as appropriate)  No acute events throughout shift. Patient AAOx4 and afebrile. All VSS, Pt restarted on Levo gtt overnight when MAP <60 after dialysis. Pt became very uncomfortable and in pain during final few minutes of dialysis. PRN meds given but full relief was only achieved when HD was stopped. 1.8L pulled off from dialysis. Pt anuric and no BM this shift. POC reviewed with patient. All questions and concerns addressed. Will continue to monitor.

## 2018-01-23 NOTE — PROGRESS NOTES
Arrived in room for dialysis tx. Pt alert and oriented. Offered no c/o. Left arm fistula with good thrill and bruit. Will attempt 3 hour dialysis tx to remove 2 liters of fluid. Cannulated fistula X2 with buttonhole needle. Good blood flow obtained.

## 2018-01-23 NOTE — PLAN OF CARE
[facility]    HOME HEALTH ORDERS  FACE TO FACE ENCOUNTER    Patient Name: Althea Hsieh  YOB: 1955    PCP: Davis Syed MD   PCP Address: [address] / [place] [address]  PCP Phone Number: [phone]  PCP Fax: [phone]    Encounter Date: 01/23/2018    Admit to Home Health    Diagnoses:  Active Hospital Problems    Diagnosis  POA    *Hypovolemic shock [R57.1]  No    ACS (acute coronary syndrome) [I24.9]  Yes    Acute combined systolic and diastolic heart failure [I50.41]  Yes    Chronic respiratory failure with hypoxia and hypercapnia [J96.11, J96.12]  Yes    RLS (restless legs syndrome) [G25.81]  Yes    Chronic back pain [M54.9, G89.29]  Yes    Hyperphosphatemia [E83.39]  No    Skin tear of forearm without complication [S51.819A]  Yes    Community acquired pneumonia [J18.9]  Yes    Trauma [T14.90XA]  Yes     Patient with recent fall, with drop in hgb, rib fractures, and hematoma on posterior surface of left back.      Chronic kidney disease-mineral and bone disorder [N18.9, E83.9, M89.9]  Yes    Inferior myocardial infarction [I21.19]  Yes    Thrombocytopenia [D69.6]  Yes    Cellulitis [L03.90]  Yes    PAD S/P femoral-popliteal bypass surgery [Z95.828]  Not Applicable    ESRD on dialysis [N18.6, Z99.2]  Not Applicable     Chronic      Resolved Hospital Problems    Diagnosis Date Resolved POA    Metabolic acidosis [E87.2] 01/20/2018 No    Unstable angina [I20.0] 01/22/2018 Yes    Hypoxia [R09.02] 01/18/2018 Yes    HTN (hypertension) [I10] 01/20/2018 Yes       No future appointments.        I have seen and examined this patient face to face today. My clinical findings that support the need for the home health skilled services and home bound status are the following:  Weakness/numbness causing balance and gait disturbance due to Fracture and Weakness/Debility making it taxing to leave home.  Requiring assistive device to leave home  due to unsteady gait caused by  Fracture and Weakness/Debility.  Patient with medication mismanagement issues requiring home bound status as evidenced by  Poor understanding of medication regimen/dosage.    Allergies:  Review of patient's allergies indicates:   Allergen Reactions    Sulfamethoxazole-trimethoprim Other (See Comments)     Pt is unsure if she is actually allergic to the medicine.        Diet: renal diet    Activities: activity as tolerated    Nursing:   SN to complete comprehensive assessment including routine vital signs. Instruct on disease process and s/s of complications to report to MD. Review/verify medication list sent home with the patient at time of discharge  and instruct patient/caregiver as needed. Frequency may be adjusted depending on start of care date.    Notify MD if SBP > 160 or < 90; DBP > 90 or < 50; HR > 120 or < 50; Temp > 101      CONSULTS:    Physical Therapy to evaluate and treat. Evaluate for home safety and equipment needs; Establish/upgrade home exercise program. Perform / instruct on therapeutic exercises, gait training, transfer training, and Range of Motion.  Occupational Therapy to evaluate and treat. Evaluate home environment for safety and equipment needs. Perform/Instruct on transfers, ADL training, ROM, and therapeutic exercises.  Aide to provide assistance with personal care, ADLs, and vital signs.    MISCELLANEOUS CARE:  N/A    WOUND CARE ORDERS  n/a      Medications: Review discharge medications with patient and family and provide education.      Current Discharge Medication List      CONTINUE these medications which have NOT CHANGED    Details   ALBUTEROL SULFATE (PROAIR HFA INHL) Inhale 1 puff into the lungs every 4 to 6 hours as needed.       ALPRAZolam (XANAX) 1 MG tablet Take 1 mg by mouth 3 (three) times daily as needed for Insomnia or Anxiety.      amiodarone (PACERONE) 200 MG Tab TAKE ONE TABLET BY MOUTH DAILY FOR HEART rhythm  Refills: 60      ammonium  lactate (LAC-HYDRIN) 12 % lotion Apply topically 2 (two) times daily. AAA LE  Qty: 1 Bottle, Refills: 1      aspirin (ECOTRIN) 81 MG EC tablet Take 1 tablet (81 mg total) by mouth once daily.  Qty: 365 tablet, Refills: 0    Associated Diagnoses: ESRD on dialysis      atorvastatin (LIPITOR) 80 MG tablet TAKE ONE TABLET BY MOUTH DAILY after supper FOR CHOLESTEROL  Refills: 5      calcitRIOL (ROCALTROL) 0.25 MCG Cap Take 0.25 mcg by mouth once daily.       clopidogrel (PLAVIX) 75 mg tablet Take 75 mg by mouth once daily.      cyclobenzaprine (FLEXERIL) 10 MG tablet Take 10 mg by mouth daily as needed.       diphenoxylate-atropine 2.5-0.025 mg (LOMOTIL) 2.5-0.025 mg per tablet Take 2 tablets by mouth every 6 (six) hours as needed for Diarrhea.      escitalopram oxalate (LEXAPRO) 20 MG tablet Take 20 mg by mouth once daily.      ferric citrate (AURYXIA) 210 mg iron Tab Take 420 mg by mouth 3 (three) times daily. TAKE 2 TABLETS BY MOUTH THREE TIMES A DAY WITH MEALS AND TAKE 1 TABLET BY MOUTH TWO TIMES A DAY WITH SNACKS      lanthanum (FOSRENOL) 1000 MG chewable tablet Take 1,000 mg by mouth 3 (three) times daily with meals.      levocarnitine (CARNITOR) 330 mg Tab Take 330 mg by mouth once daily.  Refills: 1      levothyroxine (SYNTHROID) 125 MCG tablet Take 125 mcg by mouth before breakfast.       lidocaine-prilocaine (EMLA) cream APPLY TO AREA ONE TO TWO hours prior TO dialysis treatment  Refills: 3      midodrine (PROAMATINE) 10 MG tablet Take one tablet by mouth before dialysis  Refills: 2      multivitamin capsule Take 1 capsule by mouth once daily.      nitroGLYCERIN (NITROSTAT) 0.4 MG SL tablet Place 0.4 mg under the tongue every 5 (five) minutes as needed for Chest pain.      oxyCODONE (ROXICODONE) 30 MG Tab Take 30 mg by mouth every 8 (eight) hours as needed. Takes 1/2 to 1 tablet PO PRN pain.      pantoprazole (PROTONIX) 40 MG tablet Take 40 mg by mouth once daily.      pramipexole (MIRAPEX) 0.5 MG tablet Take 1  mg by mouth every evening.       prochlorperazine (COMPAZINE) 10 MG tablet Take 1 tablet (10 mg total) by mouth 3 (three) times daily as needed (with meals for nausea , 2 to 3  times a day prn). 1 Tablet Oral Before meals  Qty: 45 tablet, Refills: 0    Associated Diagnoses: ESRD (end stage renal disease)      PROTEIN SUPPLEMENT (LIQUACEL ORAL) Take 15 mLs by mouth 2 (two) times daily.      fluconazole (DIFLUCAN) 100 MG tablet Take 100 mg by mouth once daily. TAKE ONLY WHILE ON ANTIBIOTICS TO PREVENT YEAST INFECTIONS.         STOP taking these medications       cilostazol (PLETAL) 100 MG Tab Comments:   Reason for Stopping:         metoprolol succinate (TOPROL-XL) 50 MG 24 hr tablet Comments:   Reason for Stopping:               I certify that this patient is confined to her home and needs intermittent skilled nursing care, physical therapy and occupational therapy.

## 2018-01-23 NOTE — PLAN OF CARE
Home health arranged with [facility] via [facility].  Pt will return home with her spouse.  She is requesting the above agency.     just received call from [facility] and they are unable to accept pt due to her rehospitalization rate.  Pt has been in the hospital times 8 within 11 months.  Will notify pt and send to another hh agency.

## 2018-01-23 NOTE — SUBJECTIVE & OBJECTIVE
Interval History: HD yesterday, ended 15 min early due to cramping. 1.8 L removed. Placed on low dose levophed after dialysis. BP in 130s in AM. Denies CP, SOB. Cramping has improved.     Review of Systems   Constitution: Positive for weakness. Negative for chills and fever.   HENT: Negative for sore throat.    Eyes: Negative for blurred vision.   Cardiovascular: Negative for chest pain, dyspnea on exertion and palpitations.   Respiratory: Negative for cough, shortness of breath and wheezing.    Skin: Positive for color change.   Musculoskeletal: Positive for back pain and joint pain.   Gastrointestinal: Positive for bloating. Negative for abdominal pain, constipation, diarrhea, nausea and vomiting.   Neurological: Negative for dizziness, headaches and light-headedness.     Objective:     Vital Signs (Most Recent):  Temp: 98.4 °F (36.9 °C) (01/23/18 1300)  Pulse: (!) 58 (01/23/18 1600)  Resp: (!) 25 (01/22/18 0600)  BP: (!) 126/54 (01/23/18 1600)  SpO2: 96 % (01/23/18 0900) Vital Signs (24h Range):  Temp:  [98 °F (36.7 °C)-98.4 °F (36.9 °C)] 98.4 °F (36.9 °C)  Pulse:  [53-75] 58  SpO2:  [90 %-100 %] 96 %  BP: ()/(45-54) 126/54  Arterial Line BP: ()/(14-71) 141/54     Weight: 46.7 kg (102 lb 15.3 oz)  Body mass index is 20.11 kg/m².     SpO2: 96 %  O2 Device (Oxygen Therapy): room air      Intake/Output Summary (Last 24 hours) at 01/23/18 1758  Last data filed at 01/23/18 1000   Gross per 24 hour   Intake           507.82 ml   Output             2300 ml   Net         -1792.18 ml       Lines/Drains/Airways     Central Venous Catheter Line                 Trialysis (Dialysis) Catheter 01/14/18 1346 right internal jugular 9 days          Drain                 Hemodialysis AV Graft Left upper arm -- days         Hemodialysis AV Fistula 01/14/18 0849 Left upper arm 9 days          Arterial Line                 Arterial Line 01/19/18 1600 Right Brachial 4 days                Physical Exam   Constitutional: She  is oriented to person, place, and time. She appears well-developed and well-nourished.   HENT:   Head: Normocephalic and atraumatic.   Neck: Normal range of motion. Neck supple. No JVD present.   Cardiovascular: Normal rate, regular rhythm and normal heart sounds.  Exam reveals no gallop and no friction rub.    No murmur heard.  Pulmonary/Chest: Effort normal.   Difficulty with taking a deep breath due to rib fractures but improved from previous   Mild rases at base of lungs but significantly improved   Abdominal: Soft. Bowel sounds are normal. She exhibits distension.   Musculoskeletal: Normal range of motion. She exhibits no edema.   Neurological: She is alert and oriented to person, place, and time.   Skin: Skin is warm and dry.   Erythema noted on left anterior shin. Improved from previous exam       Significant Labs: All pertinent lab results from the last 24 hours have been reviewed.

## 2018-01-23 NOTE — SUBJECTIVE & OBJECTIVE
Interval History:   HD started around 8pm last night. Tolerated for 2 hours 45 min; treatment ended early 2/2 cramping. UF 1.8L. Patient reports cramping is located in her feet; says this occurs even when she is off dialysis. Levophed restarted after HD as her MAP was <60. No events overnight. Patient doing okay this morning. Standing up and walking with PT/OT. No complaints. Remains on RA. No UOP.     Review of patient's allergies indicates:   Allergen Reactions    Sulfamethoxazole-trimethoprim Other (See Comments)     Pt is unsure if she is actually allergic to the medicine.      Current Facility-Administered Medications   Medication Frequency    0.9%  NaCl infusion PRN    albuterol inhaler 2 puff Q6H PRN    ALPRAZolam tablet 1 mg TID PRN    amiodarone tablet 200 mg Daily    aspirin EC tablet 81 mg Daily    atorvastatin tablet 80 mg Daily    calcitRIOL capsule 0.25 mcg Daily    clopidogrel tablet 75 mg Daily    cyclobenzaprine tablet 5 mg TID PRN    dextromethorphan-guaifenesin  mg per 12 hr tablet 1 tablet BID    escitalopram oxalate tablet 20 mg Daily    levOCARNitine capsule 250 mg Daily    levothyroxine tablet 125 mcg Before breakfast    lidocaine-prilocaine cream PRN    nitroGLYCERIN SL tablet 0.4 mg Q5 Min PRN    norepinephrine 16 mg in dextrose 5 % 250 mL infusion Continuous    ondansetron injection 4 mg Q6H PRN    oxyCODONE immediate release tablet 15 mg Q6H PRN    pantoprazole EC tablet 40 mg Daily    polyethylene glycol packet 17 g Daily    pramipexole tablet 1 mg QHS    prochlorperazine tablet 10 mg TID PRN    sevelamer carbonate tablet 800 mg TID WM    sodium chloride 0.9% flush 3 mL Q8H       Objective:     Vital Signs (Most Recent):  Temp: 98 °F (36.7 °C) (01/23/18 0702)  Pulse: (!) 59 (01/23/18 0758)  Resp: (!) 25 (01/22/18 0600)  BP: (!) 110/34 (01/22/18 0400)  SpO2: (!) 93 % (01/23/18 0758)  O2 Device (Oxygen Therapy): room air (01/23/18 0758) Vital Signs (24h  Range):  Temp:  [97.4 °F (36.3 °C)-98 °F (36.7 °C)] 98 °F (36.7 °C)  Pulse:  [51-75] 59  SpO2:  [78 %-100 %] 93 %  Arterial Line BP: ()/(32-71) 99/35     Weight: 46.7 kg (102 lb 15.3 oz) (01/23/18 0337)  Body mass index is 20.11 kg/m².  Body surface area is 1.41 meters squared.    I/O last 3 completed shifts:  In: 1088.2 [P.O.:510; I.V.:78.2; Other:500]  Out: 2300 [Other:2300]    Physical Exam   Constitutional: She appears well-developed and well-nourished. No distress.   HENT:   Head: Normocephalic and atraumatic.   Eyes: Conjunctivae and EOM are normal.   Neck: Neck supple. No tracheal deviation present.   Pulmonary/Chest: Effort normal. No respiratory distress.   Abdominal: Soft. She exhibits distension.   Skin: Skin is warm and dry. She is not diaphoretic.   Psychiatric: She has a normal mood and affect. Her behavior is normal.       Significant Labs:  CBC:   Recent Labs  Lab 01/23/18 0337   WBC 6.96   RBC 2.94*   HGB 8.9*   HCT 27.9*   *   MCV 95   MCH 30.3   MCHC 31.9*     CMP:   Recent Labs  Lab 01/23/18 0337     100  100   CALCIUM 8.8  8.8  8.8   ALBUMIN 2.4*  2.4*   PROT 6.0     140  140   K 4.1  4.1  4.1   CO2 26  26  26     104  104   BUN 15  15  15   CREATININE 3.1*  3.1*  3.1*   ALKPHOS 119   ALT 13   AST 17   BILITOT 0.3     All labs within the past 24 hours have been reviewed.     Significant Imaging:  Labs: Reviewed

## 2018-01-23 NOTE — PROGRESS NOTES
Dialysis tx terminated 15 mins early, due to c/o severe cramping. No other problems noted during tx. Ultrafiltrated 1.8 liters of fluid. No prolonged or excessive bleeding. Light pr dsg applied to needle sticks. LAF with good thrill and bruit.

## 2018-01-23 NOTE — PT/OT/SLP PROGRESS
"Occupational Therapy   Treatment    Name: Althea Hsieh  MRN: [id number]  Admitting Diagnosis:  Hypovolemic shock  8 Days Post-Op    Recommendations:     Discharge Recommendations: home with home health      Subjective     "It feels good to get up and walk" pt reports.     Communicated with: nsg prior to session.  Pain/Comfort:  · Pain Rating 1: 7/10  · Location 1:  (back)  · Pain Addressed 1: Reposition, Distraction, Nurse notified      Objective:     Patient found supine in bed :      General Precautions: Standard, fall   Orthopedic Precautions:N/A     Occupational Performance:    Bed Mobility:    · Patient completed Supine to Sit with supervision     Functional Mobility/Transfers:  · Patient completed Sit <> Stand Transfer with supervision  with  rolling walker     Activities of Daily Living:  · Grooming: supervision seated  · UB Dressing: moderate assistance due to extensive lines  · LB Dressing: stand by assistance seated EOB to manage footwear    Patient left up in chair with all lines intact, call button in reach and nsg notified    [facility] 6 Click:  AMPA Total Score: 17    Treatment & Education:  Pt tolerated sitting EOB with Fair+ sitting balance. Pt completed functional mobility in room and hallway with SBA with rolling walker. Pt needing cues for upright cervical posture with good effort but limited cervical ROM since PTA.  Education provided to pt re: safety with functional mobility/ADL skills and OT POC. Pt receptive to rec of  OT upon d/c.   Education:    Assessment:     Althea Hsieh is a 62 y.o. female with a medical diagnosis of Hypovolemic shock.   Performance deficits affecting function are weakness, impaired self care skills, impaired functional mobilty, impaired endurance, gait instability, impaired balance, pain.  Pt has progressed well since eval. Goals remain appropriate. Pt to benefit from cont OT with  services when medically stable for d/c home.     Rehab Prognosis:  Good ; patient " would benefit from acute skilled OT services to address these deficits and reach maximum level of function.       Plan:     Patient to be seen 4 x/week to address the above listed problems via self-care/home management, therapeutic activities, therapeutic exercises  · Plan of Care Expires:    · Plan of Care Reviewed with: patient    This Plan of care has been discussed with the patient who was involved in its development and understands and is in agreement with the identified goals and treatment plan    GOALS:    Occupational Therapy Goals        Problem: Occupational Therapy Goal    Goal Priority Disciplines Outcome Interventions   Occupational Therapy Goal     OT, PT/OT     Description:  Goals to be met by:  7 days 1/25/18     Patient will increase functional independence with ADLs by performing:    UE Dressing with Supervision.  LE Dressing with Supervision.  Grooming while seated with Supervision.  Toileting from bedside commode with Supervision for hygiene and clothing management.   Stand pivot transfers with Supervision.  Toilet transfer to toilet with Supervision.                      Time Tracking:     OT Date of Treatment: 01/23/18  OT Start Time: 0800  OT Stop Time: 0820  OT Total Time (min): 20 min    Billable Minutes:Self Care/Home Management 15    ALISE Zavala  1/23/2018

## 2018-01-23 NOTE — PT/OT/SLP PROGRESS
Physical Therapy Treatment    Patient Name:  Althea Hsieh   MRN:  [id number]  Co-treat with OT  Recommendations:     Discharge Recommendations:  home health PT, home health OT   Discharge Equipment Recommendations: none   Barriers to discharge: None    Assessment:     Althea Hsieh is a 62 y.o. female admitted with a medical diagnosis of Hypovolemic shock.  She presents with the following impairments/functional limitations:  gait instability, weakness, pain. Pt progressing towards goals, but not at PLOF. Pt tolerated session well with no increase in pain/discomfort.  Pt continues with forward head posture. Pt is improving with therapy evidenced by increased gait distance. Recommend d/c to Home with HH PT and OT to maximize functional independence.      Rehab Prognosis:  good; patient would benefit from acute skilled PT services to address these deficits and reach maximum level of function.      Recent Surgery: Procedure(s) (LRB):  Left heart cath (Left) 8 Days Post-Op    Plan:     During this hospitalization, patient to be seen 4 x/week to address the above listed problems via gait training, therapeutic activities, therapeutic exercises, neuromuscular re-education  · Plan of Care Expires:  02/16/18   Plan of Care Reviewed with: patient    Subjective     Communicated with RN prior to session.  Patient found in bed upon PT entry to room, agreeable to treatment.      Chief Complaint: pain   Patient comments/goals: to get better and return home   Pain/Comfort:  · Pain Rating 1: 7/10 (posterior ribs )  · Pain Addressed 1: Distraction, Reposition  · Pain Rating Post-Intervention 1: 7/10    Patients cultural, spiritual, [employer] conflicts given the current situation: none reported     Objective:     Patient found with: pulse ox (continuous), telemetry, blood pressure cuff, central line, arterial line     General Precautions: Standard, fall   Orthopedic Precautions:N/A   Braces: N/A     Functional Mobility:  · Bed  Mobility:     · Scooting: supervision  · Supine to Sit: supervision  · Transfers:     · Sit to Stand:  supervision with rolling walker from EOB  · Gait: ~154 ft with RW with SBA; VC's for upright posture; pt with decreased josé; portable monitor intact and RN present   · Balance:  · Static sit: supervision  · Dynamic sit: supervision  · Static stand: supervision with RW  · Dynamic stand: SBA with RW      AM-PAC 6 CLICK MOBILITY  Turning over in bed (including adjusting bedclothes, sheets and blankets)?: 4  Sitting down on and standing up from a chair with arms (e.g., wheelchair, bedside commode, etc.): 4  Moving from lying on back to sitting on the side of the bed?: 4  Moving to and from a bed to a chair (including a wheelchair)?: 4  Need to walk in hospital room?: 3  Climbing 3-5 steps with a railing?: 2  Total Score: 21       Therapeutic Activities and Exercises:  Educated pt on PT POC  Educated pt on importance of OOB activity and daily ambulation  Pt verbalized understanding    T/f to chair to increase tolerance to OOB activity     Patient left up in chair with all lines intact, call button in reach and RN notified..    GOALS:    Physical Therapy Goals        Problem: Physical Therapy Goal    Goal Priority Disciplines Outcome Goal Variances Interventions   Physical Therapy Goal     PT/OT, PT Ongoing (interventions implemented as appropriate)     Description:  Goals to be met by: 2018    Patient will increase functional independence with mobility by performin. Supine to sit with [place] - not met  2. Sit to stand transfer with [place] - not met  3. Bed to chair transfer with Modified [place] using Rolling Walker - not met  4. Gait  x 200 feet with Modified [place] using Rolling Walker. - not met                       Time Tracking:     PT Received On: 18  PT Start Time: 804     PT Stop Time: 828  PT Total Time (min): 24 min     Billable Minutes: Gait Training  10    Treatment Type: Treatment  PT/PTA: PT             Ifeoma Munoz PT, DPT  1/23/2018  [phone]

## 2018-01-23 NOTE — ASSESSMENT & PLAN NOTE
- continue calcitriol  - patient takes Auryxia TID w/meals at home; not available inpatient  - phos below goal; will hold sevelamer today

## 2018-01-23 NOTE — PROGRESS NOTES
[facility]  Nephrology  Progress Note    Patient Name: Althea Hsieh  MRN: [id number]  Admission Date: 1/14/2018  Hospital Length of Stay: 9 days  Attending Provider: Bassam Burt MD   Primary Care Physician: Davis Syed MD  Principal Problem:Hypovolemic shock    Subjective:     HPI: Ms. Hsieh is a 61 yo WF with CAD s/p CABG 2016, PVD, COPD, ADPKD with renal and liver involvement, and ESRD on home HD who presented to an OSH on 1/14 with chest pressure, SOB, and new O2 requirement. She recently had a fall which resulted in L rib fractures and bleeding from both arms. She received 1 unit pRBC at OSH and was transferred to [facility] for further care. Upon further chart review she has been dealing with anemia of unknown source for some time. She receives [facility] outpatient. She has been sent to GI for colonoscopy but never went. She [facility] Each session is 2 hours 23 minutes. EDW 48kg. She no longer makes urine. She has low BP at baseline and takes midodrine with HD. She hasn't performed HD since Friday. She recent recent kidney cyst rupture which resulted in mild fever and hematuria; symptoms have since resolved. She currently denies any chest pressure. Troponin has trended from 0.15 to 3.67 and now 25.4. SBP 70-90s. She is developing worsening hypoxia on 5L NC. Nephrology consulted for urgent HD for volume overload. Primary team placing trialysis catheter now. Consent for dialysis obtained by patient and placed in chart.     Interval History:   HD started around 8pm last night. Tolerated for 2 hours 45 min; treatment ended early 2/2 cramping. UF 1.8L. Patient reports cramping is located in her feet; says this occurs even when she is off dialysis. Levophed restarted after HD as her MAP was <60. No events overnight. Patient doing okay this morning. Standing up and walking with PT/OT. No complaints. Remains on RA. No UOP.     Review of patient's  allergies indicates:   Allergen Reactions    Sulfamethoxazole-trimethoprim Other (See Comments)     Pt is unsure if she is actually allergic to the medicine.      Current Facility-Administered Medications   Medication Frequency    0.9%  NaCl infusion PRN    albuterol inhaler 2 puff Q6H PRN    ALPRAZolam tablet 1 mg TID PRN    amiodarone tablet 200 mg Daily    aspirin EC tablet 81 mg Daily    atorvastatin tablet 80 mg Daily    calcitRIOL capsule 0.25 mcg Daily    clopidogrel tablet 75 mg Daily    cyclobenzaprine tablet 5 mg TID PRN    dextromethorphan-guaifenesin  mg per 12 hr tablet 1 tablet BID    escitalopram oxalate tablet 20 mg Daily    levOCARNitine capsule 250 mg Daily    levothyroxine tablet 125 mcg Before breakfast    lidocaine-prilocaine cream PRN    nitroGLYCERIN SL tablet 0.4 mg Q5 Min PRN    norepinephrine 16 mg in dextrose 5 % 250 mL infusion Continuous    ondansetron injection 4 mg Q6H PRN    oxyCODONE immediate release tablet 15 mg Q6H PRN    pantoprazole EC tablet 40 mg Daily    polyethylene glycol packet 17 g Daily    pramipexole tablet 1 mg QHS    prochlorperazine tablet 10 mg TID PRN    sevelamer carbonate tablet 800 mg TID WM    sodium chloride 0.9% flush 3 mL Q8H       Objective:     Vital Signs (Most Recent):  Temp: 98 °F (36.7 °C) (01/23/18 0702)  Pulse: (!) 59 (01/23/18 0758)  Resp: (!) 25 (01/22/18 0600)  BP: (!) 110/34 (01/22/18 0400)  SpO2: (!) 93 % (01/23/18 0758)  O2 Device (Oxygen Therapy): room air (01/23/18 0758) Vital Signs (24h Range):  Temp:  [97.4 °F (36.3 °C)-98 °F (36.7 °C)] 98 °F (36.7 °C)  Pulse:  [51-75] 59  SpO2:  [78 %-100 %] 93 %  Arterial Line BP: ()/(32-71) 99/35     Weight: 46.7 kg (102 lb 15.3 oz) (01/23/18 0337)  Body mass index is 20.11 kg/m².  Body surface area is 1.41 meters squared.    I/O last 3 completed shifts:  In: 1088.2 [P.O.:510; I.V.:78.2; Other:500]  Out: 2300 [Other:2300]    Physical Exam   Constitutional: She appears  well-developed and well-nourished. No distress.   HENT:   Head: Normocephalic and atraumatic.   Eyes: Conjunctivae and EOM are normal.   Neck: Neck supple. No tracheal deviation present.   Pulmonary/Chest: Effort normal. No respiratory distress.   Abdominal: Soft. She exhibits distension.   Skin: Skin is warm and dry. She is not diaphoretic.   Psychiatric: She has a normal mood and affect. Her behavior is normal.       Significant Labs:  CBC:   Recent Labs  Lab 01/23/18 0337   WBC 6.96   RBC 2.94*   HGB 8.9*   HCT 27.9*   *   MCV 95   MCH 30.3   MCHC 31.9*     CMP:   Recent Labs  Lab 01/23/18 0337     100  100   CALCIUM 8.8  8.8  8.8   ALBUMIN 2.4*  2.4*   PROT 6.0     140  140   K 4.1  4.1  4.1   CO2 26  26  26     104  104   BUN 15  15  15   CREATININE 3.1*  3.1*  3.1*   ALKPHOS 119   ALT 13   AST 17   BILITOT 0.3     All labs within the past 24 hours have been reviewed.     Significant Imaging:  Labs: Reviewed    Assessment/Plan:     ESRD on dialysis    - performs home hemodialysis via LUE AVF 5 days per week ([facility]); each treatment is 2 hours 23 minutes. EDW 48kg. No residual renal function  - started on SLED > HD given troponin 38. Troponin improved to 23 with medical management. Asymptomatic. SLED continued until hypoxia resolved  - RRT held over weekend  - HD yesterday; treatment ended early 2/2 cramping. Patient currently below EDW; cramping is likely 2/2 this.   - no need for HD today  - will plan for HD tomorrow; will set UF goal to match her EDW of 48kg. Midodrine PRN.         Chronic kidney disease-mineral and bone disorder    - continue calcitriol  - patient takes Auryxia TID w/meals at home; not available inpatient  - phos below goal; will hold sevelamer today            Irina Clark, PGY-5  Nephrology Fellow  [facility]  Pager: [phone]    ATTENDING PHYSICIAN ATTESTATION  I have personally interviewed and examined the patient. I  thoroughly reviewed the demographic, clinical, laboratorial and imaging information available in medical records. I agree with the assessment and recommendations provided by the subspecialty resident. Dr. Clark was under my supervision.

## 2018-01-23 NOTE — ASSESSMENT & PLAN NOTE
- performs home hemodialysis via LUE AVF 5 days per week ([facility]); each treatment is 2 hours 23 minutes. EDW 48kg. No residual renal function  - started on SLED > HD given troponin 38. Troponin improved to 23 with medical management. Asymptomatic. SLED continued until hypoxia resolved  - RRT held over weekend  - HD yesterday; treatment ended early 2/2 cramping. Patient currently below EDW; cramping is likely 2/2 this.   - no need for HD today  - will plan for HD tomorrow; will set UF goal to match her EDW of 48kg. Midodrine PRN.

## 2018-01-23 NOTE — PLAN OF CARE
01/23/18 1357   Final Note   Assessment Type Final Discharge Note   Discharge Disposition Home-Health   What phone number can be called within the next 1-3 days to see how you are doing after discharge? [phone]   Hospital Follow Up  Appt(s) scheduled? Yes   Discharge plans and expectations educations in teach back method with documentation complete? Yes   Right Care Referral Info   Post Acute Recommendation Home-care   Referral Type home health for SN and PT   Facility Name [facility]      Patient is ready for discharge. Pending rollator delivery to bedside and and her  to retrieve her home O2 and return with his vehicle for transportation home.

## 2018-01-24 NOTE — PT/OT/SLP DISCHARGE
Physical Therapy Discharge Summary    Name: Althea Hsieh  MRN: [id number]   Principal Problem: Hypovolemic shock     Patient Discharged from acute Physical Therapy on 2018.  Please refer to prior PT noted date on 2018 for functional status.     Assessment:     Patient was discharged unexpectedly.  Information required to complete an accurate discharge summary is unknown.  Refer to therapy initial evaluation and last progress note for initial and most recent functional status and goal achievement.  Recommendations made may be found in medical record.    Objective:     GOALS:    Physical Therapy Goals        Problem: Physical Therapy Goal    Goal Priority Disciplines Outcome Goal Variances Interventions   Physical Therapy Goal     PT/OT, PT Ongoing (interventions implemented as appropriate)     Description:  Goals to be met by: 2018    Patient will increase functional independence with mobility by performin. Supine to sit with [place] - not met  2. Sit to stand transfer with [place] - not met  3. Bed to chair transfer with Modified [place] using Rolling Walker - not met  4. Gait  x 200 feet with Modified [place] using Rolling Walker. - not met                       Reasons for Discontinuation of Therapy Services  Transfer to alternate level of care.      Plan:     Patient Discharged to: Home with Home Health Service.    Ifeoma Munoz, PT, DPT  2018  [phone]

## 2018-01-24 NOTE — ASSESSMENT & PLAN NOTE
Shock likely multifactorial including hypovolemia from CRRT volume removal and metabolic acidosis superimposed on chronic respiratory acidosis. Unlikely to be cardiogenic shock based on Svo2  No sign of sepsis, blood cultures are negative and just finished course of ceftriaxone and remains on doxycycline.    - D/c levophed. Will give 250 bolus x 2 today and discharge home   - D/c scheduled midodrine 5 mg TID. Resume midodrine 10 mg prior to dialysis

## 2018-01-24 NOTE — DISCHARGE SUMMARY
[facility]  Cardiology  Discharge Summary      Patient Name: Althea Hsieh  MRN: [id number]  Admission Date: 1/14/2018  Hospital Length of Stay: 9 days  Discharge Date and Time: 1/23/2018  4:44 PM  Attending Physician: Bassam Burt MD  Discharging Provider: Chevy Johnston MD  Primary Care Physician: Davis Syed MD    HPI:   Ms. Hsieh is a 62 y.o. Lady with coronary artery disease s/p CABG 6/2016, remote PCI 1990s, ESRD on HD at home (no HD in 3 days), ADPKD, COPD, tobacco abuse, GIB PUD - ulcer many year ago, PVD s/p left Fem-pop due to non-healing ulcer on left leg from venous harvest, carotid artery aneurysm - who presents with chest heaviness, shortness of breath, and anemia.  She has had what sounds like exertional angina  - shortness of breath and chest heaviness - worse with exertion, relieved with rest - over the course of the past 3-4 months.  No symptoms at rest, has NTG but has not taken any.  Then this past Friday she was at home and tripped on a slipper, fell, and hit both of her arms as well as her left chest.  Since that time she has had superficial bleeding from both forearms which she has been unable to stop.  Also during this time she has had progressive chest heaviness requiring continuous use of O2 which she normally only requires PRN with exertion. She was evaluated at outside ED, found to have left sided rib fractures, wounds to both arms, anemia hgb down 7 from 9 this past week, and inferior and lateral ST depressions, was given 1 uPRBC and transferred to [facility]. She denies any chest heaviness currently, reports that as long as she is not moving around and has oxygen on she has no chest       Procedure(s) (LRB):  Left heart cath (Left)     Indwelling Lines/Drains at time of discharge:  Lines/Drains/Airways     Central Venous Catheter Line                 Trialysis (Dialysis) Catheter 01/14/18 1346 right internal jugular 9 days          Drain                  Hemodialysis AV Graft Left upper arm -- days         Hemodialysis AV Fistula 01/14/18 0849 Left upper arm 9 days          Arterial Line                 Arterial Line 01/19/18 1600 Right Brachial 4 days                Hospital Course:  1/14 - 1/15: Admitted to CCU for ACS, volume overload, and blood loss anemia. Nephrology consulted for SLED. Received SLED overnight. Required low dose levophed during dialysis. Troponins continued to rise (17>25>38) but flattened at 38. Interventional cardiology consulted for LHC. Patient remained chest pain free. Still requiring 8 L of O2. Started on antibiotics for LLL pneumonia seen on CTA.  1/15: No acute events overnight. Patient received 1 unit pRBC yesterday and 2 units early this morning. H/H 10 and 30 this AM. SLED started overnight and continues. Requiring 0.06 of levophed for pressure support during dialysis. Patient denies CP, SOB at rest, nausea, or vomiting. Endorses some abdominal tightness but it appears to be close to baseline. Patient remains anuric. Currently satting 97% on 8 L  1/16: no acute events overnight. SLED overnight, stopped this morning. No volume removed. Denies CP or SOB at rest, but having some dyspnea. Sitting in chair on exam. Still requiring up to 7 L O2. Endorses productive cough but denies fever/chills  1/17: SLED overnight with . Patient had increase in O2 requirements (up to 12 L). Occasionally requiring levophed during fluid removal. This morning, denies SOB at rest, CP, palpitations. Has some new leg pain in left anterior shin, noted to be erythematous and slightly warm.  1/18: No acute overnight events. Off Levophed since 2am.  1/19: Had some abd cramping and nausea yesterday. Received GI cocktail which resolved pain. Required levophed overnight up to 0.18. Patient denies fever/chills, SOB, CP, abd pain, nausea, vomiting.  1/20: Required levophed overnight up to 0.2. Downtitrating now, A-line shows wide pulse pressures, no previous AI  noted on echo and no AI murmur on physical exam   1/21: No acute events overnight. Still requiring levophed 0.08. Now on home O2 regiment of 3 L. Denies CP or SOB. States that she would like to go home.  1/22: Off levophed since 5 am. HD planned for today. Will keep in ICU to see if patient is able to tolerate HD  1/23 Patient tolerated HD today but ended 15 min early due. 1.8 L removed. Placed on low dose levophed after HD. No longer requiring supplemental O2. Levophed d/c'd and patient given two 250 cc NS boluses. Patient was watched for a few hours after levophed and was deemed safe for discharge home.    Consults:   Consults         Status Ordering Provider     Inpatient consult to Interventional Cardiology  Once     Provider:  (Not yet assigned)    Completed ANTHONY WORRELL     Inpatient consult to Nephrology  Once     Provider:  (Not yet assigned)    Completed ANIYAH FIGUEROA     IP consult to case management  Once     Provider:  (Not yet assigned)    Acknowledged MARCO TRAVIS          Significant Diagnostic Studies: Labs:   BMP:   Recent Labs  Lab 01/21/18  2352  01/22/18  1404 01/22/18  2136 01/23/18  0337   GLU  --   < > 88 103 100  100  100   NA  --   < > 141 139 140  140  140   K  --   < > 3.7 3.7 4.1  4.1  4.1   CL  --   < > 114* 104 104  104  104   CO2  --   < > 18* 26 26  26  26   BUN  --   < > 27* 14 15  15  15   CREATININE  --   < > 3.9* 2.3* 3.1*  3.1*  3.1*   CALCIUM  --   < > 6.8* 9.4 8.8  8.8  8.8   MG 2.5  --  1.9  --  2.1   < > = values in this interval not displayed. and CBC   Recent Labs  Lab 01/22/18  0304 01/23/18  0337   WBC 7.04 6.96   HGB 8.6* 8.9*   HCT 28.1* 27.9*   PLT 91* 119*       Pending Diagnostic Studies:     Procedure Component Value Units Date/Time    Basic metabolic panel  [[id number]] Collected:  01/14/18 0622    Order Status:  Sent Lab Status:  In process Updated:  01/14/18 0623    Specimen:  Blood from Blood     CBC auto differential [[id number]]  Collected:  01/14/18 0622    Order Status:  Sent Lab Status:  In process Updated:  01/14/18 0623    Specimen:  Blood from Blood     Comprehensive metabolic panel [[id number]] Collected:  01/14/18 0622    Order Status:  Sent Lab Status:  In process Updated:  01/14/18 0623    Specimen:  Blood from Blood     Limited Doppler Echo Only [[id number]]     Order Status:  Sent Lab Status:  No result           Final Active Diagnoses:    Diagnosis Date Noted POA    PRINCIPAL PROBLEM:  Hypovolemic shock [R57.1] 01/19/2018 No    ACS (acute coronary syndrome) [I24.9] 01/22/2018 Yes    Acute combined systolic and diastolic heart failure [I50.41] 01/19/2018 Yes    Chronic respiratory failure with hypoxia and hypercapnia [J96.11, J96.12] 01/19/2018 Yes    RLS (restless legs syndrome) [G25.81] 01/16/2018 Yes    Chronic back pain [M54.9, G89.29] 01/16/2018 Yes    Hyperphosphatemia [E83.39] 01/16/2018 No    Skin tear of forearm without complication [S51.819A] 01/15/2018 Yes    Community acquired pneumonia [J18.9] 01/15/2018 Yes    Trauma [T14.90XA] 01/14/2018 Yes    Chronic kidney disease-mineral and bone disorder [N18.9, E83.9, M89.9] 01/14/2018 Yes    Inferior myocardial infarction [I21.19] 01/13/2018 Yes    Thrombocytopenia [D69.6] 11/09/2017 Yes    Cellulitis [L03.90] 11/08/2017 Yes    PAD S/P femoral-popliteal bypass surgery [Z95.828] 10/19/2016 Not Applicable    ESRD on dialysis [N18.6, Z99.2] 11/01/2014 Not Applicable     Chronic      Problems Resolved During this Admission:    Diagnosis Date Noted Date Resolved POA    Metabolic acidosis [E87.2] 01/19/2018 01/20/2018 No    Unstable angina [I20.0] 01/14/2018 01/22/2018 Yes    Hypoxia [R09.02] 05/02/2015 01/18/2018 Yes    HTN (hypertension) [I10]  01/20/2018 Yes     * Hypovolemic shock    Shock likely multifactorial including hypovolemia from CRRT volume removal and metabolic acidosis superimposed on chronic respiratory acidosis. Unlikely to be cardiogenic shock  based on Svo2  No sign of sepsis, blood cultures are negative and just finished course of ceftriaxone and remains on doxycycline.    - D/c levophed. Will give 250 bolus x 2 today and discharge home   - D/c scheduled midodrine 5 mg TID. Resume midodrine 10 mg prior to dialysis           Chronic respiratory failure with hypoxia and hypercapnia    - 2/2 acute HR exacerbation   - Improved with volume removal via CRRT   - now close to baseline O2 requirement         Acute combined systolic and diastolic heart failure    patient presented with SOB with bilateral pulmonary edema. Inciting event was likely NSTEMI   - Initial echo on 1/14 showed EF 30-35%. Repeat echo on 1/21 showed EF 40-45% with diastolic dysfunction   - Has been receiving CRRT with volume removal as tolerated. Resume HD  - Unable to start BB or ACE-I given hypotension           Hyperphosphatemia    - likely due to renal failure  - continue home phos binders        Chronic back pain    - resume home oxycodone IR 30 mg prn   - can give flexeril as needed        RLS (restless legs syndrome)    -continue home pramipexole         Community acquired pneumonia    - CTA chest showed abnormal acute airspace disease left lower lobe suspicious for pneumonia  - patient is afebrile and without productive cough. Now down to 3 L   - Finished course of IV ceftriaxone on 1/19/18        Skin tear of forearm without complication    Wound care consulted        Inferior myocardial infarction    Long hx of CAD with PCI in 1977 and 1980 and CABG x 3 in 6/2016. Still smokes. Here with NSTEMi likely demand ischemia from acute blood loss anemia.    - Echo shows EF 30-35% with severe left atrial enlargement. The inferior wall and apex are akinetic with global hypokinesis  - Holding off on heparin gtt due to high bleeding risk  - Continue ASA/plavix  - Received 4 units pRBC 1/14/18 . Hgb stable now  - Troponins flat. Patient is chest-pain free  - Will c/w medical management  -  Holding BB due to shock         Thrombocytopenia    - likely due to chronic liver disease (polycystic liver?)  - continue to follow        Cellulitis    Erythema and warmth noted on left anterior shin  Area outlined with marker. Improved from prior exam   Completed doxycycline 100 mg Q12 x 5 days 1/22/18        PAD S/P femoral-popliteal bypass surgery    - No current pain or non healing ulcers at present.  - Continue ASA/Plavix  - Hold cilostazol in the setting of heart failure exacerbation         ESRD on dialysis    - Tolerated HD yesterday. 1.8 L removed  - Plan to d/c and resume home HD              Discharged Condition: fair    Disposition: Home-Health Care [facility]    Follow Up:    Patient Instructions:     WALKER FOR HOME USE   Order Specific Question Answer Comments   Type of Walker: Rollator    With wheels? Yes    Height: 5' (1.524 m)    Weight: 46.7 kg (102 lb 15.3 oz)    Length of need (1-99 months): 99    Does patient have medical equipment at home? walker, rolling    Please check all that apply: Patient's condition impairs ambulation.    Vendor: Other (use comments) Pt received a RW in 2016 not eligible   Expected Date of Delivery: 1/23/2018      WALKER FOR HOME USE   Order Specific Question Answer Comments   Type of Walker: Rollator    With wheels? Yes    Height: 5' (1.524 m)    Weight: 46.7 kg (102 lb 15.3 oz)    Length of need (1-99 months): 99    Does patient have medical equipment at home? walker, rolling    Please check all that apply: Patient's condition impairs ambulation.    Please check all that apply: Patient is unable to safely ambulate without equipment.    Please check all that apply: Patient needs help to get in and out of chair.    Please check all that apply: Walker will be used for gait training.    Vendor: Other (use comments) Pt received a RW in 2016 not eligible   Expected Date of Delivery: 1/23/2018        Medications:  Reconciled Home Medications:   Discharge Medication List as of  1/23/2018  3:02 PM      CONTINUE these medications which have NOT CHANGED    Details   ALBUTEROL SULFATE (PROAIR HFA INHL) Inhale 1 puff into the lungs every 4 to 6 hours as needed. , Historical Med      ALPRAZolam (XANAX) 1 MG tablet Take 1 mg by mouth 3 (three) times daily as needed for Insomnia or Anxiety., Historical Med      amiodarone (PACERONE) 200 MG Tab TAKE ONE TABLET BY MOUTH DAILY FOR HEART rhythm, Historical Med      ammonium lactate (LAC-HYDRIN) 12 % lotion Apply topically 2 (two) times daily. AAA LE, Starting Sat 11/11/2017, Normal      aspirin (ECOTRIN) 81 MG EC tablet Take 1 tablet (81 mg total) by mouth once daily., Starting Wed 11/8/2017, Until Thu 11/8/2018, OTC      atorvastatin (LIPITOR) 80 MG tablet TAKE ONE TABLET BY MOUTH DAILY after supper FOR CHOLESTEROL, Historical Med      calcitRIOL (ROCALTROL) 0.25 MCG Cap Take 0.25 mcg by mouth once daily. , Historical Med      clopidogrel (PLAVIX) 75 mg tablet Take 75 mg by mouth once daily., Until Discontinued, Historical Med      cyclobenzaprine (FLEXERIL) 10 MG tablet Take 10 mg by mouth daily as needed. , Historical Med      diphenoxylate-atropine 2.5-0.025 mg (LOMOTIL) 2.5-0.025 mg per tablet Take 2 tablets by mouth every 6 (six) hours as needed for Diarrhea., Historical Med      escitalopram oxalate (LEXAPRO) 20 MG tablet Take 20 mg by mouth once daily., Historical Med      ferric citrate (AURYXIA) 210 mg iron Tab Take 420 mg by mouth 3 (three) times daily. TAKE 2 TABLETS BY MOUTH THREE TIMES A DAY WITH MEALS AND TAKE 1 TABLET BY MOUTH TWO TIMES A DAY WITH SNACKS, Historical Med      fluconazole (DIFLUCAN) 100 MG tablet Take 100 mg by mouth once daily. TAKE ONLY WHILE ON ANTIBIOTICS TO PREVENT YEAST INFECTIONS., Historical Med      lanthanum (FOSRENOL) 1000 MG chewable tablet Take 1,000 mg by mouth 3 (three) times daily with meals., Historical Med      levocarnitine (CARNITOR) 330 mg Tab Take 330 mg by mouth once daily., Starting Fri 6/30/2017,  Historical Med      levothyroxine (SYNTHROID) 125 MCG tablet Take 125 mcg by mouth before breakfast. , Historical Med      lidocaine-prilocaine (EMLA) cream APPLY TO AREA ONE TO TWO hours prior TO dialysis treatment, Historical Med      midodrine (PROAMATINE) 10 MG tablet Take one tablet by mouth before dialysis, Historical Med      multivitamin capsule Take 1 capsule by mouth once daily., Historical Med      nitroGLYCERIN (NITROSTAT) 0.4 MG SL tablet Place 0.4 mg under the tongue every 5 (five) minutes as needed for Chest pain., Historical Med      oxyCODONE (ROXICODONE) 30 MG Tab Take 30 mg by mouth every 8 (eight) hours as needed. Takes 1/2 to 1 tablet PO PRN pain., Historical Med      pantoprazole (PROTONIX) 40 MG tablet Take 40 mg by mouth once daily., Historical Med      pramipexole (MIRAPEX) 0.5 MG tablet Take 1 mg by mouth every evening. , Historical Med      prochlorperazine (COMPAZINE) 10 MG tablet Take 1 tablet (10 mg total) by mouth 3 (three) times daily as needed (with meals for nausea , 2 to 3  times a day prn). 1 Tablet Oral Before meals, Starting 12/30/2013, Until Discontinued, Print      PROTEIN SUPPLEMENT (LIQUACEL ORAL) Take 15 mLs by mouth 2 (two) times daily., Historical Med         STOP taking these medications       cilostazol (PLETAL) 100 MG Tab Comments:   Reason for Stopping:         metoprolol succinate (TOPROL-XL) 50 MG 24 hr tablet Comments:   Reason for Stopping:               Time spent on the discharge of patient: >30 minutes    Chevy Johnston MD  Cardiology  [facility]

## 2018-01-24 NOTE — PROGRESS NOTES
[facility]  Cardiology  Progress Note    Patient Name: Althea Hsieh  MRN: [id number]  Admission Date: 1/14/2018  Hospital Length of Stay: 9 days  Code Status: Full Code   Attending Physician: No att. providers found   Primary Care Physician: Davis Syed MD  Expected Discharge Date: 1/23/2018  Principal Problem:Hypovolemic shock    Subjective:     Hospital Course:   1/14 - 1/15: Admitted to CCU for ACS, volume overload, and blood loss anemia. Nephrology consulted for SLED. Received SLED overnight. Required low dose levophed during dialysis. Troponins continued to rise (17>25>38) but flattened at 38. Interventional cardiology consulted for C. Patient remained chest pain free. Still requiring 8 L of O2. Started on antibiotics for LLL pneumonia seen on CTA.  1/15: No acute events overnight. Patient received 1 unit pRBC yesterday and 2 units early this morning. H/H 10 and 30 this AM. SLED started overnight and continues. Requiring 0.06 of levophed for pressure support during dialysis. Patient denies CP, SOB at rest, nausea, or vomiting. Endorses some abdominal tightness but it appears to be close to baseline. Patient remains anuric. Currently satting 97% on 8 L  1/16: no acute events overnight. SLED overnight, stopped this morning. No volume removed. Denies CP or SOB at rest, but having some dyspnea. Sitting in chair on exam. Still requiring up to 7 L O2. Endorses productive cough but denies fever/chills  1/17: SLED overnight with . Patient had increase in O2 requirements (up to 12 L). Occasionally requiring levophed during fluid removal. This morning, denies SOB at rest, CP, palpitations. Has some new leg pain in left anterior shin, noted to be erythematous and slightly warm.  1/18: No acute overnight events. Off Levophed since 2am.  1/19: Had some abd cramping and nausea yesterday. Received GI cocktail which resolved pain. Required levophed overnight up to 0.18. Patient denies  fever/chills, SOB, CP, abd pain, nausea, vomiting.  1/20: Required levophed overnight up to 0.2. Downtitrating now, A-line shows wide pulse pressures, no previous AI noted on echo and no AI murmur on physical exam   1/21: No acute events overnight. Still requiring levophed 0.08. Now on home O2 regiment of 3 L. Denies CP or SOB. States that she would like to go home.  1/22: Off levophed since 5 am. HD planned for today. Will keep in ICU to see if patient is able to tolerate HD    Interval History: HD yesterday, ended 15 min early due to cramping. 1.8 L removed. Placed on low dose levophed after dialysis. BP in 130s in AM. Denies CP, SOB. Cramping has improved.     Review of Systems   Constitution: Positive for weakness. Negative for chills and fever.   HENT: Negative for sore throat.    Eyes: Negative for blurred vision.   Cardiovascular: Negative for chest pain, dyspnea on exertion and palpitations.   Respiratory: Negative for cough, shortness of breath and wheezing.    Skin: Positive for color change.   Musculoskeletal: Positive for back pain and joint pain.   Gastrointestinal: Positive for bloating. Negative for abdominal pain, constipation, diarrhea, nausea and vomiting.   Neurological: Negative for dizziness, headaches and light-headedness.     Objective:     Vital Signs (Most Recent):  Temp: 98.4 °F (36.9 °C) (01/23/18 1300)  Pulse: (!) 58 (01/23/18 1600)  Resp: (!) 25 (01/22/18 0600)  BP: (!) 126/54 (01/23/18 1600)  SpO2: 96 % (01/23/18 0900) Vital Signs (24h Range):  Temp:  [98 °F (36.7 °C)-98.4 °F (36.9 °C)] 98.4 °F (36.9 °C)  Pulse:  [53-75] 58  SpO2:  [90 %-100 %] 96 %  BP: ()/(45-54) 126/54  Arterial Line BP: ()/(14-71) 141/54     Weight: 46.7 kg (102 lb 15.3 oz)  Body mass index is 20.11 kg/m².     SpO2: 96 %  O2 Device (Oxygen Therapy): room air      Intake/Output Summary (Last 24 hours) at 01/23/18 4769  Last data filed at 01/23/18 1000   Gross per 24 hour   Intake           507.82 ml    Output             2300 ml   Net         -1792.18 ml       Lines/Drains/Airways     Central Venous Catheter Line                 Trialysis (Dialysis) Catheter 01/14/18 1346 right internal jugular 9 days          Drain                 Hemodialysis AV Graft Left upper arm -- days         Hemodialysis AV Fistula 01/14/18 0849 Left upper arm 9 days          Arterial Line                 Arterial Line 01/19/18 1600 Right Brachial 4 days                Physical Exam   Constitutional: She is oriented to person, place, and time. She appears well-developed and well-nourished.   HENT:   Head: Normocephalic and atraumatic.   Neck: Normal range of motion. Neck supple. No JVD present.   Cardiovascular: Normal rate, regular rhythm and normal heart sounds.  Exam reveals no gallop and no friction rub.    No murmur heard.  Pulmonary/Chest: Effort normal.   Difficulty with taking a deep breath due to rib fractures but improved from previous   Mild rases at base of lungs but significantly improved   Abdominal: Soft. Bowel sounds are normal. She exhibits distension.   Musculoskeletal: Normal range of motion. She exhibits no edema.   Neurological: She is alert and oriented to person, place, and time.   Skin: Skin is warm and dry.   Erythema noted on left anterior shin. Improved from previous exam       Significant Labs: All pertinent lab results from the last 24 hours have been reviewed.        Assessment and Plan:       * Hypovolemic shock    Shock likely multifactorial including hypovolemia from CRRT volume removal and metabolic acidosis superimposed on chronic respiratory acidosis. Unlikely to be cardiogenic shock based on Svo2  No sign of sepsis, blood cultures are negative and just finished course of ceftriaxone and remains on doxycycline.    - D/c levophed. Will give 250 bolus x 2 today and discharge home   - D/c scheduled midodrine 5 mg TID. Resume midodrine 10 mg prior to dialysis           Chronic respiratory failure with  hypoxia and hypercapnia    - 2/2 acute HR exacerbation   - Improved with volume removal via CRRT   - now close to baseline O2 requirement         Acute combined systolic and diastolic heart failure    patient presented with SOB with bilateral pulmonary edema. Inciting event was likely NSTEMI   - Initial echo on 1/14 showed EF 30-35%. Repeat echo on 1/21 showed EF 40-45% with diastolic dysfunction   - Has been receiving CRRT with volume removal as tolerated. Resume HD  - Unable to start BB or ACE-I given hypotension           Hyperphosphatemia    - likely due to renal failure  - continue home phos binders        Chronic back pain    - resume home oxycodone IR 30 mg prn   - can give flexeril as needed        RLS (restless legs syndrome)    -continue home pramipexole         Community acquired pneumonia    - CTA chest showed abnormal acute airspace disease left lower lobe suspicious for pneumonia  - patient is afebrile and without productive cough. Now down to 3 L   - Finished course of IV ceftriaxone on 1/19/18        Skin tear of forearm without complication    Wound care consulted        Inferior myocardial infarction    Long hx of CAD with PCI in 1977 and 1980 and CABG x 3 in 6/2016. Still smokes. Here with NSTEMi likely demand ischemia from acute blood loss anemia.    - Echo shows EF 30-35% with severe left atrial enlargement. The inferior wall and apex are akinetic with global hypokinesis  - Holding off on heparin gtt due to high bleeding risk  - Continue ASA/plavix  - Received 4 units pRBC 1/14/18 . Hgb stable now  - Troponins flat. Patient is chest-pain free  - Will c/w medical management  - Holding BB due to shock         Thrombocytopenia    - likely due to chronic liver disease (polycystic liver?)  - continue to follow        Cellulitis    Erythema and warmth noted on left anterior shin  Area outlined with marker. Improved from prior exam   Completed doxycycline 100 mg Q12 x 5 days 1/22/18        PAD S/P  femoral-popliteal bypass surgery    - No current pain or non healing ulcers at present.  - Continue ASA/Plavix  - Hold cilostazol in the setting of heart failure exacerbation         Tobacco abuse    Smoking cessation discussed         ESRD on dialysis    - Tolerated HD yesterday. 1.8 L removed  - Plan to d/c and resume home HD          CAD (coronary artery disease)    Hx of PCI and CABG x 3. Will continue home medications.             VTE Risk Mitigation         Ordered     Low Risk of VTE  Once      01/14/18 0320          Chevy Johnston MD  Cardiology  [facility]

## 2018-01-24 NOTE — ASSESSMENT & PLAN NOTE
patient presented with SOB with bilateral pulmonary edema. Inciting event was likely NSTEMI   - Initial echo on 1/14 showed EF 30-35%. Repeat echo on 1/21 showed EF 40-45% with diastolic dysfunction   - Has been receiving CRRT with volume removal as tolerated. Resume HD  - Unable to start BB or ACE-I given hypotension

## 2018-01-24 NOTE — ASSESSMENT & PLAN NOTE
Long hx of CAD with PCI in 1977 and 1980 and CABG x 3 in 6/2016. Still smokes. Here with NSTEMi likely demand ischemia from acute blood loss anemia.    - Echo shows EF 30-35% with severe left atrial enlargement. The inferior wall and apex are akinetic with global hypokinesis  - Holding off on heparin gtt due to high bleeding risk  - Continue ASA/plavix  - Received 4 units pRBC 1/14/18 . Hgb stable now  - Troponins flat. Patient is chest-pain free  - Will c/w medical management  - Holding BB due to shock

## 2018-01-24 NOTE — PATIENT INSTRUCTIONS
Fall Prevention  Falls often occur due to slipping, tripping or losing your balance. Millions of people fall every year and injure themselves. Here are ways to reduce your risk of falling again.  · Think about your fall, was there anything that caused your fall that can be fixed, removed, or replaced?  · Make your home safe by keeping walkways clear of objects you may trip over.  · Use non-slip pads under rugs. Do not use area rugs or small throw rugs.  · Use non-slip mats in bathtubs and showers.  · Install handrails and lights on staircases.  · Do not walk in poorly lit areas.  · Do not stand on chairs or wobbly ladders.  · Use caution when reaching overhead or looking upward. This position can cause a loss of balance.  · Be sure your shoes fit properly, have non-slip bottoms and are in good condition.   · Wear shoes both inside and out. Avoid going barefoot or wearing slippers.  · Be cautious when going up and down stairs, curbs, and when walking on uneven sidewalks.  · If your balance is poor, consider using a cane or walker.  · If your fall was related to alcohol use, stop or limit alcohol intake.   · If your fall was related to use of sleeping medicines, talk to your doctor about this. You may need to reduce your dosage at bedtime if you awaken during the night to go to the bathroom.    · To reduce the need for nighttime bathroom trips:  ¨ Avoid drinking fluids for several hours before going to bed  ¨ Empty your bladder before going to bed  ¨ Men can keep a urinal at the bedside  · Stay as active as you can. Balance, flexibility, strength, and endurance all come from exercise. They all play a role in preventing falls. Ask your healthcare provider which types of activity are right for you.  · Get your vision checked on a regular basis.  · If you have pets, know where they are before you stand up or walk so you don't trip over them.  · Use night lights.  Date Last Reviewed: 11/5/2015  © 7967-4565 [employer]  "[employer]. [address] [place] [address], [place] [address]. All rights reserved. This information is not intended as a substitute for professional medical care. Always follow your healthcare professional's instructions.

## 2018-01-24 NOTE — PT/OT/SLP DISCHARGE
Occupational Therapy Discharge Summary    Althea Hsieh  MRN: [id number]   Principal Problem: Hypovolemic shock      Patient Discharged from acute Occupational Therapy on 1/24/2018  .  Please refer to prior OT note dated 1/23/18 for functional status.    Assessment:      Patient appropriate for care in another setting.    Objective:     GOALS:    Occupational Therapy Goals        Problem: Occupational Therapy Goal    Goal Priority Disciplines Outcome Interventions   Occupational Therapy Goal     OT, PT/OT     Description:  Goals to be met by:  7 days 1/25/18     Patient will increase functional independence with ADLs by performing:    UE Dressing with Supervision.  LE Dressing with Supervision.  Grooming while seated with Supervision.  Toileting from bedside commode with Supervision for hygiene and clothing management.   Stand pivot transfers with Supervision.  Toilet transfer to toilet with Supervision.                    Pt progressed well with therapy, but all goals not yet achieved prior to hospital d/c.     Reasons for Discontinuation of Therapy Services  Transfer to alternate level of care.      Plan:     Patient Discharged to: Home with Home Health Service    ALISE Zavala  1/24/2018

## 2018-01-29 NOTE — PROGRESS NOTES
Date of service: 1/29/18  [facility] for Dr. Love  Home dialysis monthly rounds/ visit    HPI:  Ms. Hsieh was seen today in home hemodialysis clinic. Since this is her first visit with me, I reviewed prior pertinent chart. She has ADPKD causing ESRD. She has COPD, uses home oxygen but states she does not use it 24/7, has CAD, CHF. She takes midodrine prior to her HD treatments. She dialyzes 5 days per week at home on [facility].     She has had multiple hospitalization and per staff at the clinic she has tenuous status overall. Her most recent hospitalization pushed the clinic date till today as she was hospitalized at [facility] from 1/14/18 till 1/23/18. She presented there as a transfer from [facility] for possible STEMI. She had an episode of fall at home prior to initial presentation to ER at OSH. She had, as a result, acute fractures of her ribs and had extensive skin tears that caused severe blood loss per patient. She essentially had severe anemia with Hb of 7.4. She had troponin leak upto 38. She was treated with blood transfusions. She was noted to be hypotensive and was maintained on CRRT with levophed support for some time. She was under cardiology care. She was advised to stop BB on discharge. She was also taken off Pletal and was told this was to reduce fall risk. She had dose increase in midodrine to tid during hospitalization but at the time of discharge she was brought back to previous dose prior to hospitalization which is once 30 minutes prior to home hemo. She did have worsening of EF which improved to 40 on repeat echo. Repeat echo also showed elevated PA pressure of 53. Pt had medical management. Also she was treated with antibiotic for pneumonia.    She does not make any urine. She states since her discharge she has been doing ok. She had one follow up visit with post discharge clinic. She denies any problems with her access. She states she had one episode of diarrhea yesterday which  made her reduce the treatment time but today she states she has been feeling ok and does not have recurrence of diarrhea. She told me she had changed her dry weight to 48 kg recently and she feels at that weight she does not have any cramping/ muscle weakness.     She had labs drawn again on 1/25/18 which was after her hospital discharge. Those labs were reviewed  Hb 9.6  Hct 28.8  Plt 116K  Na 142  K 4.7  Bicarb 20  BUN 43  Creatinine 7.34  Phos 5.6  CC 9.0    Albumin 3.4  Ferritin 1299     REVIEW OF SYSTEMS: see HPI    PHYSICAL EXAMINATION: 118/54  T: 96.1  weight: 48.6 kg  HR 70     GENERAL: thin built, NAD  CARDIOVASCULAR: Rate and rhythm regular.  LUNGS: Clear to auscultation bilaterally.  ABD: distended. Soft.  Extr: edema, per patient chronic and unchanged  Access with good thrill  Extensive ecchymosis on forearms bilaterally but not extending over access area        ASSESSMENT AND PLAN:   ESRD  Cardiomyopathy  COPD  Secondary hyperparathyroidism  Hyperphosphatemia  Anemia of chronic illness, multifactorial, s/p acute worsening due to blood loss  S/p fall, rib fractures, skin tears      - keep dry weight at 48 kg for now  - pt to report if any recurrence of diarrhea she had last night, in light of recent hospitalization, will need to rule out C diff if she has ongoing diarrhea and also any ongoing diarrhea will affect her already tenuous volume status  - continue midodrine prior to HD  - give Mircera dose now  - continue to trend H/H closely  - management of thrombocytopenia per PCP/ heme   - continue current daily dose of calcitriol and same dose of phos binder  - renal diet  - increase protein intake, take protein supplements   - follow KT/V, 2.0 on most recent labs  - continue to follow with Dr. Kate Mcintosh d/w patient and team. Pt's questions answered to her satisfaction.

## 2018-02-14 NOTE — PROGRESS NOTES
This is a 62-year-old white female with ESRD secondary to ADPKD for   approximately six years and multiple medical problems is here for f/u for home hemo .   Her blood pressures are better on midodrine.  Recent EGD neg.  Currently denies bleeding or dark stools. Hgb slightly lower but stabilizing after the drop and 4 unit PRBC's given during recent hopsitalization.  Recent hopsitalization for angina and fall previously-demand ischemia from hgb drop per cards.     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     SOCIAL HISTORY: She smokes one and half pack a day, which is down from three packs a day.      REVIEW OF SYSTEMS: Negative. No chest pain, no shortness of breath.  PHYSICAL EXAMINATION: 119/51  DW:48  kg W: 47.2   kg P: 74     GENERAL: The patient does not appear to be in any acute distress.  CARDIOVASCULAR: Rate and rhythm regular.  LUNGS: Clear to auscultation bilaterally.  ABD:  Slightly tender-distended.  EXTREMITIES: RUE Access stable. Gonzales ext with trace edema. L LE healed.    acess stable-good thrill. No erythema.         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     Labs pending.  1. ESRD. Kt/v 2.3  UF of under 0.5L.  Fluid retsriction emphasized. cramps better with decrease in DW as she is close to 46.5 kg.  Relax K restriction slightly as K better.  2. Hypertension. Blood pressures are stable off metoprolol. Off metoprolol since the hopsitalization.   On midodrine.   3. Anemia:  Increase  mircera to 150 mcg  2x a  month.  Had egd recently but no colonoscopy. Rec setting up colonoscopy-has appoint next  month with GI.  Will receive iron- Gi appoint ASAP for colonoscopy.    Stabilizing but concerned about GI blled.  guiac cards positive.  Discussed with family.  4. Nutrition: albumin is lower. She is on LiquaCel. High protein diet discussed.  5. Renal osteodystrophy: phos higher-low phos diet emphasized on 2 auryxia with each meal.  Off carafate started by Gi but held b/c of higher aluminum level. PTH improved -on  calcitriol 0.25 mcg  daily to help with low ca and PTH. Off Lasix as she does not urinate much. aluminum level improved off carafate.   Previously C/o hematuria -although doesn't make urine-streaks of blood-rec urology eval although it could be from ruptured cysts. Heavy smoker-would need cystoscopy although it was neg from 2-3 years ago per pt. Discussed the risks.   L arm access-recently had angioplasty.     plt ct low stable -hematology eval in hospital they thought it was due to abx. If no improvement, will need to refer to hematology.

## 2018-03-23 NOTE — PROGRESS NOTES
This is a 62-year-old white female with ESRD secondary to ADPKD for   approximately six years and multiple medical problems is here for f/u for home hemo .   Her blood pressures are better on midodrine.  Recent EGD neg.  Currently denies bleeding or dark stools. Hgb slightly lower but stabilizing after the drop and 4 unit PRBC's given during recent hopsitalization.  Recent hopsitalization for angina and fall previously-demand ischemia from hgb drop per cards. No recent hospitalizations after that.      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     SOCIAL HISTORY: She smokes one and half pack a day, which is down from three packs a day.      REVIEW OF SYSTEMS: Negative. No chest pain, no shortness of breath.  PHYSICAL EXAMINATION: 96/71  DW:46.5   kg W: 46.4    kg P: 60     GENERAL: The patient does not appear to be in any acute distress.  CARDIOVASCULAR: Rate and rhythm regular.  LUNGS: Clear to auscultation bilaterally.  ABD:  Slightly tender-distended.  EXTREMITIES: RUE Access stable. Gonzales ext with trace edema. L LE healed.    acess stable-good thrill. No erythema.         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     Labs pending.  1. ESRD. Kt/v 2.3  UF of under 0.5L.  Fluid retsriction emphasized. cramps better with decrease in DW as she is close to 46.5 kg.  Relax K restriction slightly as K slightly low.  2. Hypertension. Blood pressures are stable off metoprolol. Off metoprolol since the hopsitalization.   on metoprolol prn. On midodrine.   3. Anemia: stable.  Increase  mircera to 200 mcg  2x a  month.  Had  egd recently but no colonoscopy. Rec setting up colonoscopy-has appoint next month with GI.  Will receive iron- Gi appoint ASAP for colonoscopy.    Stabilizing but concerned about GI blled.  guiac cards positive.  Discussed with family.  4. Nutrition: albumin is better  at 3.9.  She is on LiquaCel. High protein diet discussed.  5. Renal osteodystrophy: phos better-low phos diet emphasized on 2 auryxia with each meal.  Off carafate started by Gi but held b/c of higher aluminum level. PTH improved -on  calcitriol 0.25 mcg  daily to help with low ca(improved) and PTH. Off Lasix as she does not urinate much. aluminum level improved off carafate.   Previously C/o hematuria -although doesn't make urine-streaks of blood-rec urology eval although it could be from ruptured cysts. Heavy smoker-would need cystoscopy although it was neg from 2-3 years ago per pt. Discussed the risks.   L arm access-recently had angioplasty.     plt ct low stable -hematology eval in hospital they thought it was due to abx. If no improvement, will need to refer to hematology.

## 2018-04-11 NOTE — PROGRESS NOTES
This is a 62-year-old white female with ESRD secondary to ADPKD for   approximately six years and multiple medical problems is here for f/u for home hemo .   Her blood pressures are better on midodrine.  Recent EGD neg.  Currently denies bleeding or dark stools. Hgb improving. No recent hospitalization..      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     SOCIAL HISTORY: She smokes one and half pack a day, which is down from three packs a day.      REVIEW OF SYSTEMS: Negative. No chest pain, no shortness of breath.  PHYSICAL EXAMINATION: 140/62   DW:46.5   kg W: 47.7   kg  P: 79     GENERAL: The patient does not appear to be in any acute distress.  CARDIOVASCULAR: Rate and rhythm regular.  LUNGS: Clear to auscultation bilaterally.  ABD:  Slightly tender-distended.  EXTREMITIES: RUE Access stable. Gonzales ext with trace edema with 2 bandages on R leg and one on R leg-oozes at times-home health taking care of it.. L LE healed.    acess stable-good thrill. No erythema.         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     Labs pending.  1. ESRD. Kt/v 2.4  UF of under 0.5L or less.  Fluid restriction emphasized. cramps better with decrease in DW as she is close to 46.5 kg.  Relax K restriction slightly as K slightly low but stable now.  2. Hypertension. Blood pressures are stable off metoprolol. Off metoprolol since the hopsitalization.   on metoprolol prn. On midodrine.   3. Anemia: stable.  on  mircera to 200 mcg  a  month.  Had egd recently but no colonoscopy. Rec setting up colonoscopy-has appoint next month with GI.   Will receive iron- Gi appoint ASAP for colonoscopy.    Stabilizing but concerned about GI bleed.  guiac cards positive.  Discussed with family.  4. Nutrition: albumin is better  at 3.8.  She is on LiquaCel. High protein diet discussed.  5. Renal osteodystrophy: phos higher-low phos diet emphasized on 2 auryxia with each meal.  emphasized the importance. Off carafate started by Gi but held b/c of higher aluminum level. PTH improved -on  calcitriol 0.25 mcg  daily to help with low ca(improved) and PTH. Off Lasix as she does not urinate much. aluminum level improved off carafate.   Previously C/o hematuria -although doesn't make urine-streaks of blood-rec urology eval although it could be from ruptured cysts. Heavy smoker-would need cystoscopy although it was neg from 2-3 years ago per pt. Discussed the risks.   L arm access-recently had angioplasty.  Nml 's.    Plt ct low stable - hematology eval in hospital they thought it was due to abx. If no improvement, will need to refer to hematology. Smokes 5-7 cigs a day-counseled.

## 2018-05-26 PROBLEM — L03.119 CELLULITIS OF LOWER EXTREMITY: Status: ACTIVE | Noted: 2018-01-01

## 2018-05-26 PROBLEM — A41.9 SEPSIS: Status: ACTIVE | Noted: 2018-01-01

## 2018-05-27 PROBLEM — B95.5 STREPTOCOCCAL BACTEREMIA: Status: ACTIVE | Noted: 2018-01-01

## 2018-05-27 PROBLEM — R78.81 STREPTOCOCCAL BACTEREMIA: Status: ACTIVE | Noted: 2018-01-01

## 2018-05-28 PROBLEM — A40.1: Status: ACTIVE | Noted: 2018-01-01

## 2018-05-28 PROBLEM — Z71.3 ENCOUNTER FOR DIETARY CONSULTATION: Status: ACTIVE | Noted: 2018-01-01

## 2018-05-29 PROBLEM — R78.81 STREPTOCOCCAL BACTEREMIA: Status: RESOLVED | Noted: 2018-01-01 | Resolved: 2018-01-01

## 2018-05-29 PROBLEM — A41.9 SEPSIS: Status: RESOLVED | Noted: 2018-01-01 | Resolved: 2018-01-01

## 2018-05-29 PROBLEM — A40.1: Status: RESOLVED | Noted: 2018-01-01 | Resolved: 2018-01-01

## 2018-05-29 PROBLEM — B95.5 STREPTOCOCCAL BACTEREMIA: Status: RESOLVED | Noted: 2018-01-01 | Resolved: 2018-01-01

## 2018-06-07 NOTE — PROGRESS NOTES
This is a 62-year-old white female with ESRD secondary to ADPKD for   approximately six years and multiple medical problems is here for f/u for home hemo .   Her blood pressures are better on midodrine.  Recent EGD neg.  Currently denies bleeding or dark stools. Hgb improving. No recent hospitalization.. Recently hospitalized for fever and was treated for cellulitis.     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     SOCIAL HISTORY: She smokes one and half pack a day, which is down from three packs a day.      REVIEW OF SYSTEMS: Negative. No chest pain, no shortness of breath.  PHYSICAL EXAMINATION: 120/55    DW:46.5   kg W: 46.5  kg  P: 83     GENERAL: The patient does not appear to be in any acute distress.  CARDIOVASCULAR: Rate and rhythm regular.  LUNGS: Clear to auscultation bilaterally.  ABD:  Slightly tender-distended.  EXTREMITIES: RUE Access stable. Gonzales ext with trace edema with 1 bandage on L leg   acess stable-good thrill. No erythema.         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     Labs pending.  1. ESRD. Kt/v 2.3  UF of under 0.5L to 1L or less.  Fluid restriction emphasized. change Dw to 45 kg as she lost some weight. Relax K restriction slightly as K slightly low but stable now.  2. Hypertension. Blood pressures are stable off metoprolol. On midodrine. Pt not taking lisinopril 2.5mg  started in the hospital as bp's lower.  3. Anemia: stable.  on  mircera to 150 mcg  a  month.  Had egd recently but no colonoscopy. Rec setting up colonoscopy-has appoint next month with GI.  Will receive iron- Gi  appoint ASAP for colonoscopy.    Stabilizing but concerned about GI bleed.  guiac cards positive.  Discussed with family.  4. Nutrition: albumin is better  at 3.9.  She is on LiquaCel. High protein diet discussed.  5. Renal osteodystrophy: phos stable-low phos diet emphasized on 2 auryxia with each meal.  emphasized the importance. Off carafate started by Gi but held b/c of higher aluminum level. PTH improved -on  calcitriol 0.25 mcg  daily to help with low ca(improved) and PTH. Off Lasix as she does not urinate much. aluminum level improved off carafate.   Previously C/o hematuria -although doesn't make urine-streaks of blood-rec urology eval although it could be from ruptured cysts. Heavy smoker-would need cystoscopy although it was neg from 2-3 years ago per pt. Discussed the risks.   L arm access-recently had angioplasty.  Nml 's.    Plt ct low stable - hematology eval in hospital they thought it was due to abx. If no improvement, will need to refer to hematology. Smokes 5-7 cigs a day-counseled.  Finishing augmentin and flagyl for CDif and cellulitis.

## 2018-06-28 PROBLEM — L03.116 BILATERAL LOWER LEG CELLULITIS: Status: ACTIVE | Noted: 2018-01-01

## 2018-06-28 PROBLEM — L03.115 BILATERAL LOWER LEG CELLULITIS: Status: ACTIVE | Noted: 2018-01-01

## 2018-06-28 PROBLEM — M15.9 PRIMARY OSTEOARTHRITIS INVOLVING MULTIPLE JOINTS: Status: ACTIVE | Noted: 2018-01-01

## 2018-06-29 PROBLEM — R29.6 FALLS FREQUENTLY: Chronic | Status: ACTIVE | Noted: 2018-01-01

## 2018-07-13 NOTE — PROGRESS NOTES
This is a 62-year-old white female with ESRD secondary to ADPKD for   approximately six years and multiple medical problems is here for f/u for home hemo .   Her blood pressures are better on midodrine.  Recent EGD neg.  Currently denies bleeding or dark stools. Hgb stable.  recent hospitalization for cellulitis but cltx negative.       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     SOCIAL HISTORY: She smokes one and half pack a day, which is down from three packs a day.      REVIEW OF SYSTEMS: Negative. No chest pain, no shortness of breath.    PHYSICAL EXAMINATION: 95/61    DW:45   kg W: 45.7 kg  P: 106     GENERAL: The patient does not appear to be in any acute distress.  CARDIOVASCULAR: Rate and rhythm regular.  LUNGS: Clear to auscultation bilaterally.  ABD:  Slightly tender-distended.  EXTREMITIES: RUE Access stable. Gonzales ext with trace edema with no evidence of cellulitis.  acess stable-good thrill. No erythema.         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     Labs pending.  1. ESRD. Kt/v 2.3  UF of  0.5L to 2 L or less.  Fluid restriction emphasized. Dw of  45 kg as she lost some weight. Relax K restriction slightly as K slightly low but stable now.  2. Hypertension. Blood pressures are stable off metoprolol. On midodrine. Pt not taking lisinopril 2.5mg  started in the hospital as bp's lower.  3. Anemia: stable.  on  mircera to 150 mcg  a  month.  Had egd recently but no colonoscopy. Rec setting up colonoscopy-has appoint next month with GI.  Will receive iron- Gi appoint ASAP for colonoscopy.     Stabilizing but concerned about GI bleed.  guiac cards positive.  Discussed with family.  4. Nutrition: albumin is lower 3.5 with hopsitalized.  She is on LiquaCel. High protein diet discussed.  5. Renal osteodystrophy: phos high -low phos diet emphasized on 2 auryxia with each meal.  emphasized the importance. Off carafate started by Gi but held b/c of higher aluminum level. PTH improved -on  calcitriol 0.25 mcg  daily to help with low ca(improved) and PTH. Off Lasix as she does not urinate much. aluminum level improved off carafate.   Previously C/o hematuria -although doesn't make urine-streaks of blood-rec urology eval although it could be from ruptured cysts. Heavy smoker-would need cystoscopy although it was neg from 2-3 years ago per pt. Discussed the risks.   L arm access-recently had angioplasty.  Nml 's.    Plt ct low stable - hematology eval in hospital they thought it was due to abx. If no improvement, will need to refer to hematology. Smokes 3 cigs a day-counseled.

## 2018-07-25 PROBLEM — I48.91 A-FIB: Status: ACTIVE | Noted: 2018-01-01

## 2018-07-25 PROBLEM — J18.9 PNEUMONIA OF RIGHT LOWER LOBE DUE TO INFECTIOUS ORGANISM: Status: ACTIVE | Noted: 2018-01-01

## 2018-07-25 PROBLEM — A41.9 SEPSIS: Status: ACTIVE | Noted: 2018-01-01

## 2018-07-25 PROBLEM — I50.42 CHRONIC COMBINED SYSTOLIC AND DIASTOLIC HEART FAILURE: Status: ACTIVE | Noted: 2018-01-01

## 2018-07-27 PROBLEM — J96.02 ACUTE RESPIRATORY FAILURE WITH HYPOXIA AND HYPERCAPNIA: Status: ACTIVE | Noted: 2018-01-01

## 2018-07-27 PROBLEM — J96.01 ACUTE RESPIRATORY FAILURE WITH HYPOXIA AND HYPERCAPNIA: Status: ACTIVE | Noted: 2018-01-01

## 2018-08-18 ENCOUNTER — TELEPHONE (OUTPATIENT)
Dept: ADMINISTRATIVE | Facility: CLINIC | Age: 63
End: 2018-08-18

## 2020-07-01 NOTE — PLAN OF CARE
Problem: Patient Care Overview  Goal: Plan of Care Review  Outcome: Ongoing (interventions implemented as appropriate)  Patient progressing towards discharge.  Patient had no acute events noted throughout the night at this time.  See Doc Flowsheets for documented results.       Infusions: Levophed - Off - goal SBP > 85     Neurological:  Responds to voice.  Opens eyes on command.  Pupils equal and reactive.  Oriented x 4.     Cardiac: Atrial fibrillation Rhythm.  Pulses weak, but palpable in all extremities.  Capillary refill < 3 seconds in all extremities.  Blood pressures in bilateral lower extremities significantly lower than arm.  Consistently achieving 100-120 SBP in right arm without levophed.    Respiratory: Weaned HF nasal cannula to 6 L.  Coarse breath sounds.     Gastrointestinal: No BM overnight.     Genitourinary: Anuric.  CRRT SCUF with GF of 450 currently.     Musculoskeletal: Generalized weakness.     Integumentary/Other: Patient repositioned every 2 hours throughout the night. Bilateral upper extremity bruising and skin tears, covered with gauze and kerlex.  Red discolored areas to bilateral lower extremities.       Discussed plan of care with patient and family with verbalization of understanding.  Will continue to monitor.         Shift assessment, completed, see flow sheet. Pt is alert and oriented x 4. Following commands. HR 92, /70, SpO2 92%. Respirations are easy, even, and unlabored. Bilateral lung sounds diminished. PIV, WNL, saline locked. Ruiz in place and patent with STAT lock. Call light within reach. Bed in lowest position. Bed alarm on.  Will continue to monitor

## 2021-03-25 NOTE — PROGRESS NOTES
Saturation: Site=Ao (Aorta) , O2=96 %, Hgb=13 gm/dl, Condition=Condition 1. Used in calculation. nephro team at bedside, ordered to take patient off CRRT. Sandoval at bedside to take patient off CRRT.

## 2023-02-08 NOTE — PROGRESS NOTES
Notified MD Plascencia of patient HR maintaining @ 50-52. Got as low as 48 while asleep. Order for a EKG to be done.    Humira Counseling:  I discussed with the patient the risks of adalimumab including but not limited to myelosuppression, immunosuppression, autoimmune hepatitis, demyelinating diseases, lymphoma, and serious infections.  The patient understands that monitoring is required including a PPD at baseline and must alert us or the primary physician if symptoms of infection or other concerning signs are noted.

## 2025-02-13 NOTE — TELEPHONE ENCOUNTER
----- Message from Dionte Barr sent at 7/21/2017  2:19 PM CDT -----  Contact: same  Patient called in and requested a message be sent over regarding stint in right leg and if patient needs to be seen and when.  Patient call back number is [phone]   Yes

## (undated) DEVICE — CATH DORADO 10X4

## (undated) DEVICE — INFLATOR ENCORE

## (undated) DEVICE — SHEATH 7FR 25CM

## (undated) DEVICE — COVERS PROBE NR-48 STERILE

## (undated) DEVICE — KIT INTRO MICRO NIT VSI 4FR

## (undated) DEVICE — SET DECANTER MEDICHOICE

## (undated) DEVICE — GAUZE SPONGE 4X4 12PLY

## (undated) DEVICE — GUIDEWIRE STF .035X180CM ANG

## (undated) DEVICE — DRESSING TRANS 4X4 TEGADERM

## (undated) DEVICE — DRAPE OPTIMA MAJOR PEDIATRIC

## (undated) DEVICE — Device

## (undated) DEVICE — SOL NS 1000CC